# Patient Record
Sex: FEMALE | Race: BLACK OR AFRICAN AMERICAN | NOT HISPANIC OR LATINO | Employment: FULL TIME | URBAN - METROPOLITAN AREA
[De-identification: names, ages, dates, MRNs, and addresses within clinical notes are randomized per-mention and may not be internally consistent; named-entity substitution may affect disease eponyms.]

---

## 2017-08-09 PROBLEM — M77.31 CALCANEAL SPUR, RIGHT FOOT: Status: ACTIVE | Noted: 2017-08-09

## 2018-07-03 ENCOUNTER — TELEPHONE (OUTPATIENT)
Dept: INTERNAL MEDICINE | Facility: CLINIC | Age: 38
End: 2018-07-03

## 2018-08-01 ENCOUNTER — TELEPHONE (OUTPATIENT)
Dept: INTERNAL MEDICINE | Facility: CLINIC | Age: 38
End: 2018-08-01

## 2018-08-01 NOTE — TELEPHONE ENCOUNTER
----- Message from Adele Echols MD sent at 7/18/2018  5:40 PM CDT -----  You may schedule her for Friday in afternoon and  for following week in afternoon. Maybe 3:00 appt or in an opening.   ----- Message -----  From: Adele Echols MD  Sent: 7/6/2018  12:09 PM  To: Adele Echols MD    np

## 2018-08-15 ENCOUNTER — TELEPHONE (OUTPATIENT)
Dept: INTERNAL MEDICINE | Facility: CLINIC | Age: 38
End: 2018-08-15

## 2018-08-15 NOTE — TELEPHONE ENCOUNTER
Please advise and currently not accepting new patients and provide them with some of the other providers open to new patients at this time.

## 2018-09-22 NOTE — TELEPHONE ENCOUNTER
----- Message from Soraya Vuong sent at 7/3/2018 10:24 AM CDT -----  Contact: Self/ [phone]  New patient would like to establish care with you.    Please call and advise.    English

## 2020-08-30 ENCOUNTER — OUTSIDE PLACE OF SERVICE (OUTPATIENT)
Dept: CARDIOLOGY | Facility: CLINIC | Age: 40
End: 2020-08-30
Payer: COMMERCIAL

## 2020-08-30 PROCEDURE — 93010 ELECTROCARDIOGRAM REPORT: CPT | Mod: ,,, | Performed by: INTERNAL MEDICINE

## 2020-08-30 PROCEDURE — 93010 PR ELECTROCARDIOGRAM REPORT: ICD-10-PCS | Mod: ,,, | Performed by: INTERNAL MEDICINE

## 2022-10-01 ENCOUNTER — HOSPITAL ENCOUNTER (EMERGENCY)
Facility: HOSPITAL | Age: 42
Discharge: HOME | End: 2022-10-01
Attending: EMERGENCY MEDICINE
Payer: COMMERCIAL

## 2022-10-01 VITALS
HEIGHT: 64 IN | TEMPERATURE: 99 F | DIASTOLIC BLOOD PRESSURE: 88 MMHG | RESPIRATION RATE: 19 BRPM | OXYGEN SATURATION: 98 % | BODY MASS INDEX: 38.41 KG/M2 | SYSTOLIC BLOOD PRESSURE: 140 MMHG | WEIGHT: 225 LBS | HEART RATE: 85 BPM

## 2022-10-01 DIAGNOSIS — H65.01 NON-RECURRENT ACUTE SEROUS OTITIS MEDIA OF RIGHT EAR: Primary | ICD-10-CM

## 2022-10-01 LAB — B-HCG UR QL: NEGATIVE

## 2022-10-01 PROCEDURE — 81025 URINE PREGNANCY TEST: CPT | Mod: ER | Performed by: EMERGENCY MEDICINE

## 2022-10-01 PROCEDURE — 99284 EMERGENCY DEPT VISIT MOD MDM: CPT | Mod: 25,ER

## 2022-10-01 RX ORDER — CETIRIZINE HYDROCHLORIDE 10 MG/1
10 TABLET ORAL DAILY
Qty: 30 TABLET | Refills: 0 | Status: ON HOLD | OUTPATIENT
Start: 2022-10-01 | End: 2023-04-28

## 2022-10-01 RX ORDER — KETOROLAC TROMETHAMINE 10 MG/1
10 TABLET, FILM COATED ORAL
Status: DISCONTINUED | OUTPATIENT
Start: 2022-10-01 | End: 2022-10-01

## 2022-10-01 RX ORDER — KETOROLAC TROMETHAMINE 10 MG/1
10 TABLET, FILM COATED ORAL EVERY 8 HOURS PRN
Qty: 15 TABLET | Refills: 0 | Status: SHIPPED | OUTPATIENT
Start: 2022-10-01 | End: 2022-10-01 | Stop reason: SDUPTHER

## 2022-10-01 RX ORDER — FLUTICASONE PROPIONATE 50 MCG
1 SPRAY, SUSPENSION (ML) NASAL 2 TIMES DAILY PRN
Qty: 15 G | Refills: 0 | Status: SHIPPED | OUTPATIENT
Start: 2022-10-01 | End: 2022-10-01 | Stop reason: SDUPTHER

## 2022-10-01 RX ORDER — FLUTICASONE PROPIONATE 50 MCG
1 SPRAY, SUSPENSION (ML) NASAL 2 TIMES DAILY PRN
Qty: 15 G | Refills: 0 | Status: ON HOLD | OUTPATIENT
Start: 2022-10-01 | End: 2023-04-28

## 2022-10-01 RX ORDER — KETOROLAC TROMETHAMINE 10 MG/1
10 TABLET, FILM COATED ORAL EVERY 8 HOURS PRN
Qty: 15 TABLET | Refills: 0 | Status: ON HOLD | OUTPATIENT
Start: 2022-10-01 | End: 2023-04-28

## 2022-10-01 RX ORDER — CETIRIZINE HYDROCHLORIDE 10 MG/1
10 TABLET ORAL DAILY
Qty: 30 TABLET | Refills: 0 | COMMUNITY
Start: 2022-10-01 | End: 2022-10-01 | Stop reason: SDUPTHER

## 2022-10-01 RX ORDER — LOSARTAN POTASSIUM 25 MG/1
25 TABLET ORAL DAILY
Status: ON HOLD | COMMUNITY
End: 2023-05-02 | Stop reason: SDUPTHER

## 2022-10-01 RX ORDER — METFORMIN HYDROCHLORIDE 1000 MG/1
1000 TABLET ORAL 2 TIMES DAILY WITH MEALS
COMMUNITY
End: 2024-02-07 | Stop reason: SINTOL

## 2022-10-01 NOTE — ED NOTES
Review of patient's allergies indicates:  No Known Allergies     Patient has verified the spelling of the name and  on armband.   APPEARANCE: Patient is alert, calm, oriented x 4, and does not appear distressed.  SKIN: Skin is normal for race, warm, and dry. Normal skin turgor and mucous membranes moist.  CARDIAC: Normal rate and rhythm, no murmur heard.   RESPIRATORY:Normal rate and effort. Breath sounds clear bilaterally throughout chest. Respirations are equal and unlabored.    GASTRO: Bowel sounds normal, abdomen is soft, no tenderness, and no abdominal distention.  MUSCLE: Full ROM. No bony tenderness or soft tissue tenderness. No obvious deformity.  PERIPHERAL VASCULAR: peripheral pulses present. Normal cap refill. No edema. Warm to touch.  NEURO: 5/5 strength major flexors/extensors bilaterally. Sensory intact to light touch bilaterally. Eyal coma scale: eyes open spontaneously-4, oriented & converses-5, obeys commands-6. No neurological abnormalities.   MENTAL STATUS: awake, alert and aware of environment.  EYE: No overt deficits noted. No drainage. Sclera WNL  ENT: EARS: no obvious drainage but c/o pain to right ear since this morning. NOSE: no active bleeding. THROAT: no redness or swelling.  BREAST: symmetrical. No masses. No tenderness.  GENITALIA: Normal external genitalia.  : Voids without complication

## 2022-10-02 NOTE — ED PROVIDER NOTES
Encounter Date: 10/1/2022       History     Chief Complaint   Patient presents with    Otalgia     Reports that she has had an earache to her right ear since this morning. Has a cotton ball in the ear canal because the wind makes it hurt worse. No drainage     Patient presents with concern regarding earache.  This was noted to onset earlier this morning.  She denies any significant change in her hearing.  She denies any drainage.  Symptoms are in the right ear with no complaints in the left ear at this time.  Patient also denies any significant sinus congestion or rhinorrhea.  Fever and chills are denied.    Review of patient's allergies indicates:  No Known Allergies  Past Medical History:   Diagnosis Date    Diabetes mellitus     Hypertension      Past Surgical History:   Procedure Laterality Date    BREAST LUMPECTOMY Right     TUBAL LIGATION       History reviewed. No pertinent family history.  Social History     Tobacco Use    Smoking status: Never     Passive exposure: Never    Smokeless tobacco: Never   Substance Use Topics    Alcohol use: Never    Drug use: Never     Review of Systems   Constitutional:  Negative for chills and fever.   HENT:  Positive for ear pain. Negative for congestion, ear discharge and rhinorrhea.    Respiratory:  Negative for cough and shortness of breath.    Cardiovascular:  Negative for chest pain and palpitations.   Gastrointestinal:  Negative for abdominal pain, diarrhea and vomiting.   Genitourinary:  Negative for dysuria.   Skin:  Negative for color change and rash.   Neurological:  Negative for dizziness and light-headedness.   Hematological:  Negative for adenopathy. Does not bruise/bleed easily.     Physical Exam     Initial Vitals [10/01/22 1836]   BP Pulse Resp Temp SpO2   (!) 140/88 85 19 98.9 °F (37.2 °C) 98 %      MAP       --         Physical Exam    Nursing note and vitals reviewed.  Constitutional: She appears well-developed and well-nourished. She is not diaphoretic.  No distress.   HENT:   Head: Normocephalic and atraumatic.   Mouth/Throat: Oropharynx is clear and moist.   Eyes: Conjunctivae are normal. No scleral icterus.   Neck: Neck supple. No JVD present.   Cardiovascular:  Normal rate, regular rhythm, normal heart sounds and intact distal pulses.           Pulmonary/Chest: Breath sounds normal. No respiratory distress.   Abdominal: Abdomen is soft. There is no abdominal tenderness.   Musculoskeletal:      Cervical back: Neck supple.     Neurological: She is alert and oriented to person, place, and time.   Skin: Skin is warm and dry.       ED Course   Procedures  Labs Reviewed   PREGNANCY TEST, URINE RAPID    Narrative:     Specimen Source->Urine          Imaging Results    None          Medications   ketorolac tablet 10 mg (has no administration in time range)     Medical Decision Making:   Clinical Tests:   Lab Tests: Ordered and Reviewed  All findings were reviewed with the patient/family in detail.  I see no indication of an emergent process beyond that addressed during our encounter but have duly counseled the patient/family regarding the need for prompt follow-up as well as the indications that should prompt immediate return to the emergency room should new or worrisome developments occur.  The patient has additionally been provided with printed information regarding diagnosis as well as instructions regarding follow up and any medications intended to manage the patient's aforementioned conditions.  The patient/family communicates understanding of all this information and all remaining questions and concerns were addressed at this time.                            Clinical Impression:   Final diagnoses:  [H65.01] Non-recurrent acute serous otitis media of right ear (Primary)        ED Disposition Condition    Discharge Stable          ED Prescriptions       Medication Sig Dispense Start Date End Date Auth. Provider    cetirizine (ZYRTEC) 10 MG tablet Take 1 tablet (10  mg total) by mouth once daily. 30 tablet 10/1/2022 10/1/2023 Don Joy MD    fluticasone propionate (FLONASE) 50 mcg/actuation nasal spray 1 spray (50 mcg total) by Each Nostril route 2 (two) times daily as needed for Rhinitis. 15 g 10/1/2022 -- Don Joy MD    ketorolac (TORADOL) 10 mg tablet Take 1 tablet (10 mg total) by mouth every 8 (eight) hours as needed for Pain. 15 tablet 10/1/2022 -- Don Joy MD          Follow-up Information       Follow up With Specialties Details Why Contact Info    Rosa Maria Cifuentes MD Internal Medicine Schedule an appointment as soon as possible for a visit  for reassessment [address] [place] [address]  [address]  [facility] [address] [phone]      [facility] - Emergency Dept Emergency Medicine Go to  As needed, If symptoms worsen [address] [facility] [address] [phone]             Don Joy MD  10/01/22 1912

## 2023-04-27 ENCOUNTER — HOSPITAL ENCOUNTER (INPATIENT)
Facility: HOSPITAL | Age: 43
LOS: 5 days | Discharge: HOME | DRG: 908 | End: 2023-05-03
Attending: SURGERY | Admitting: OBSTETRICS & GYNECOLOGY
Payer: COMMERCIAL

## 2023-04-27 DIAGNOSIS — E11.9 TYPE 2 DIABETES MELLITUS WITHOUT COMPLICATION, WITHOUT LONG-TERM CURRENT USE OF INSULIN: ICD-10-CM

## 2023-04-27 DIAGNOSIS — R10.31 RLQ ABDOMINAL PAIN: ICD-10-CM

## 2023-04-27 DIAGNOSIS — K35.30 ACUTE APPENDICITIS WITH LOCALIZED PERITONITIS, WITHOUT PERFORATION, ABSCESS, OR GANGRENE: ICD-10-CM

## 2023-04-27 DIAGNOSIS — N70.93 TUBO-OVARIAN ABSCESS: Primary | ICD-10-CM

## 2023-04-27 DIAGNOSIS — M77.31 CALCANEAL SPUR, RIGHT FOOT: ICD-10-CM

## 2023-04-27 DIAGNOSIS — D21.9 FIBROIDS: ICD-10-CM

## 2023-04-27 LAB
ALBUMIN SERPL BCP-MCNC: 4 G/DL (ref 3.5–5.2)
ALP SERPL-CCNC: 96 U/L (ref 38–126)
ALT SERPL W/O P-5'-P-CCNC: 20 U/L (ref 10–44)
ANION GAP SERPL CALC-SCNC: 6 MMOL/L (ref 8–16)
AST SERPL-CCNC: 19 U/L (ref 15–46)
B-HCG UR QL: NEGATIVE
BACTERIA #/AREA URNS AUTO: NORMAL /HPF
BASOPHILS # BLD AUTO: 0.03 K/UL (ref 0–0.2)
BASOPHILS NFR BLD: 0.2 % (ref 0–1.9)
BILIRUB SERPL-MCNC: 0.9 MG/DL (ref 0.1–1)
BILIRUB UR QL STRIP: NEGATIVE
CALCIUM SERPL-MCNC: 8.9 MG/DL (ref 8.7–10.5)
CHLORIDE SERPL-SCNC: 101 MMOL/L (ref 95–110)
CLARITY UR REFRACT.AUTO: CLEAR
CO2 SERPL-SCNC: 26 MMOL/L (ref 23–29)
COLOR UR AUTO: YELLOW
CREAT SERPL-MCNC: 0.62 MG/DL (ref 0.5–1.4)
CTP QC/QA: YES
DIFFERENTIAL METHOD: ABNORMAL
EOSINOPHIL # BLD AUTO: 0 K/UL (ref 0–0.5)
EOSINOPHIL NFR BLD: 0.3 % (ref 0–8)
ERYTHROCYTE [DISTWIDTH] IN BLOOD BY AUTOMATED COUNT: 12.3 % (ref 11.5–14.5)
EST. GFR  (NO RACE VARIABLE): >60 ML/MIN/1.73 M^2
GLUCOSE SERPL-MCNC: 318 MG/DL (ref 70–110)
GLUCOSE UR QL STRIP: ABNORMAL
HCT VFR BLD AUTO: 38.1 % (ref 37–48.5)
HGB BLD-MCNC: 12.2 G/DL (ref 12–16)
HGB UR QL STRIP: NEGATIVE
IMM GRANULOCYTES # BLD AUTO: 0.06 K/UL (ref 0–0.04)
IMM GRANULOCYTES NFR BLD AUTO: 0.4 % (ref 0–0.5)
KETONES UR QL STRIP: ABNORMAL
LACTATE SERPL-SCNC: 1.8 MMOL/L (ref 0.5–2.2)
LEUKOCYTE ESTERASE UR QL STRIP: NEGATIVE
LIPASE SERPL-CCNC: 82 U/L (ref 23–300)
LYMPHOCYTES # BLD AUTO: 3.4 K/UL (ref 1–4.8)
LYMPHOCYTES NFR BLD: 24.1 % (ref 18–48)
MCH RBC QN AUTO: 28.2 PG (ref 27–31)
MCHC RBC AUTO-ENTMCNC: 32 G/DL (ref 32–36)
MCV RBC AUTO: 88 FL (ref 82–98)
MICROSCOPIC COMMENT: NORMAL
MONOCYTES # BLD AUTO: 0.8 K/UL (ref 0.3–1)
MONOCYTES NFR BLD: 5.9 % (ref 4–15)
NEUTROPHILS # BLD AUTO: 9.7 K/UL (ref 1.8–7.7)
NEUTROPHILS NFR BLD: 69.1 % (ref 38–73)
NITRITE UR QL STRIP: NEGATIVE
NRBC BLD-RTO: 0 /100 WBC
PH UR STRIP: 7 [PH] (ref 5–8)
PLATELET # BLD AUTO: 336 K/UL (ref 150–450)
PMV BLD AUTO: 10.6 FL (ref 9.2–12.9)
POTASSIUM SERPL-SCNC: 3.9 MMOL/L (ref 3.5–5.1)
PROT SERPL-MCNC: 7.9 G/DL (ref 6–8.4)
PROT UR QL STRIP: NEGATIVE
RBC # BLD AUTO: 4.32 M/UL (ref 4–5.4)
SODIUM SERPL-SCNC: 133 MMOL/L (ref 136–145)
SP GR UR STRIP: 1.01 (ref 1–1.03)
URN SPEC COLLECT METH UR: ABNORMAL
UROBILINOGEN UR STRIP-ACNC: NEGATIVE EU/DL
UUN UR-MCNC: 6 MG/DL (ref 7–17)
WBC # BLD AUTO: 14.09 K/UL (ref 3.9–12.7)
YEAST UR QL AUTO: NORMAL

## 2023-04-27 PROCEDURE — 96372 THER/PROPH/DIAG INJ SC/IM: CPT | Performed by: PHYSICIAN ASSISTANT

## 2023-04-27 PROCEDURE — 83605 ASSAY OF LACTIC ACID: CPT | Mod: ER | Performed by: PHYSICIAN ASSISTANT

## 2023-04-27 PROCEDURE — 63600175 PHARM REV CODE 636 W HCPCS: Mod: ER | Performed by: PHYSICIAN ASSISTANT

## 2023-04-27 PROCEDURE — 25000003 PHARM REV CODE 250: Mod: ER | Performed by: PHYSICIAN ASSISTANT

## 2023-04-27 PROCEDURE — 96365 THER/PROPH/DIAG IV INF INIT: CPT

## 2023-04-27 PROCEDURE — 85025 COMPLETE CBC W/AUTO DIFF WBC: CPT | Mod: ER | Performed by: PHYSICIAN ASSISTANT

## 2023-04-27 PROCEDURE — 80053 COMPREHEN METABOLIC PANEL: CPT | Mod: ER | Performed by: PHYSICIAN ASSISTANT

## 2023-04-27 PROCEDURE — 96361 HYDRATE IV INFUSION ADD-ON: CPT

## 2023-04-27 PROCEDURE — 96376 TX/PRO/DX INJ SAME DRUG ADON: CPT

## 2023-04-27 PROCEDURE — 25500020 PHARM REV CODE 255: Mod: ER | Performed by: PHYSICIAN ASSISTANT

## 2023-04-27 PROCEDURE — 81025 URINE PREGNANCY TEST: CPT | Mod: ER | Performed by: PHYSICIAN ASSISTANT

## 2023-04-27 PROCEDURE — 83690 ASSAY OF LIPASE: CPT | Mod: ER | Performed by: PHYSICIAN ASSISTANT

## 2023-04-27 PROCEDURE — 96375 TX/PRO/DX INJ NEW DRUG ADDON: CPT

## 2023-04-27 PROCEDURE — 81000 URINALYSIS NONAUTO W/SCOPE: CPT | Mod: ER | Performed by: PHYSICIAN ASSISTANT

## 2023-04-27 PROCEDURE — 99285 EMERGENCY DEPT VISIT HI MDM: CPT | Mod: 25

## 2023-04-27 RX ORDER — ONDANSETRON 2 MG/ML
4 INJECTION INTRAMUSCULAR; INTRAVENOUS
Status: COMPLETED | OUTPATIENT
Start: 2023-04-27 | End: 2023-04-27

## 2023-04-27 RX ORDER — MORPHINE SULFATE 4 MG/ML
4 INJECTION, SOLUTION INTRAMUSCULAR; INTRAVENOUS
Status: COMPLETED | OUTPATIENT
Start: 2023-04-27 | End: 2023-04-27

## 2023-04-27 RX ORDER — PROMETHAZINE HYDROCHLORIDE 25 MG/ML
12.5 INJECTION, SOLUTION INTRAMUSCULAR; INTRAVENOUS
Status: COMPLETED | OUTPATIENT
Start: 2023-04-27 | End: 2023-04-27

## 2023-04-27 RX ADMIN — MORPHINE SULFATE 4 MG: 4 INJECTION INTRAVENOUS at 07:04

## 2023-04-27 RX ADMIN — ONDANSETRON HYDROCHLORIDE 4 MG: 2 SOLUTION INTRAMUSCULAR; INTRAVENOUS at 09:04

## 2023-04-27 RX ADMIN — PROMETHAZINE HYDROCHLORIDE 12.5 MG: 25 INJECTION INTRAMUSCULAR; INTRAVENOUS at 06:04

## 2023-04-27 RX ADMIN — IOHEXOL 100 ML: 350 INJECTION, SOLUTION INTRAVENOUS at 08:04

## 2023-04-27 RX ADMIN — PIPERACILLIN AND TAZOBACTAM 4.5 G: 4; .5 INJECTION, POWDER, LYOPHILIZED, FOR SOLUTION INTRAVENOUS; PARENTERAL at 09:04

## 2023-04-27 RX ADMIN — SODIUM CHLORIDE 1000 ML: 0.9 INJECTION, SOLUTION INTRAVENOUS at 07:04

## 2023-04-27 RX ADMIN — MORPHINE SULFATE 4 MG: 4 INJECTION INTRAVENOUS at 09:04

## 2023-04-27 NOTE — Clinical Note
Diagnosis: Acute appendicitis with localized peritonitis, without perforation, abscess, or gangrene [[id number]]   Future Attending Provider: OMEGA REYNOSO [[id number]]   Admitting Provider:: OMEGA REYNOSO [[id number]]

## 2023-04-28 ENCOUNTER — ANESTHESIA EVENT (OUTPATIENT)
Dept: INTERVENTIONAL RADIOLOGY/VASCULAR | Facility: HOSPITAL | Age: 43
DRG: 908 | End: 2023-04-28
Payer: COMMERCIAL

## 2023-04-28 PROBLEM — N70.93 TUBO-OVARIAN ABSCESS: Status: ACTIVE | Noted: 2023-04-28

## 2023-04-28 PROBLEM — I10 PRIMARY HYPERTENSION: Status: ACTIVE | Noted: 2023-04-28

## 2023-04-28 PROBLEM — E11.9 TYPE 2 DIABETES MELLITUS WITHOUT COMPLICATION, WITHOUT LONG-TERM CURRENT USE OF INSULIN: Status: ACTIVE | Noted: 2023-04-28

## 2023-04-28 LAB
ABO + RH BLD: NORMAL
BASOPHILS # BLD AUTO: 0.01 K/UL (ref 0–0.2)
BASOPHILS # BLD AUTO: 0.03 K/UL (ref 0–0.2)
BASOPHILS NFR BLD: 0.3 % (ref 0–1.9)
BASOPHILS NFR BLD: 0.3 % (ref 0–1.9)
BLD GP AB SCN CELLS X3 SERPL QL: NORMAL
BLD PROD TYP BPU: NORMAL
BLD PROD TYP BPU: NORMAL
BLOOD UNIT EXPIRATION DATE: NORMAL
BLOOD UNIT EXPIRATION DATE: NORMAL
BLOOD UNIT TYPE CODE: 5100
BLOOD UNIT TYPE CODE: 5100
BLOOD UNIT TYPE: NORMAL
BLOOD UNIT TYPE: NORMAL
CODING SYSTEM: NORMAL
CODING SYSTEM: NORMAL
CROSSMATCH INTERPRETATION: NORMAL
CROSSMATCH INTERPRETATION: NORMAL
DIFFERENTIAL METHOD: ABNORMAL
DIFFERENTIAL METHOD: ABNORMAL
DISPENSE STATUS: NORMAL
DISPENSE STATUS: NORMAL
EOSINOPHIL # BLD AUTO: 0 K/UL (ref 0–0.5)
EOSINOPHIL # BLD AUTO: 0.1 K/UL (ref 0–0.5)
EOSINOPHIL NFR BLD: 0.5 % (ref 0–8)
EOSINOPHIL NFR BLD: 0.6 % (ref 0–8)
ERYTHROCYTE [DISTWIDTH] IN BLOOD BY AUTOMATED COUNT: 12.7 % (ref 11.5–14.5)
ESTIMATED AVG GLUCOSE: 240 MG/DL (ref 68–131)
HBA1C MFR BLD: 10 % (ref 4–5.6)
HCT VFR BLD AUTO: 13.1 % (ref 37–48.5)
HCT VFR BLD AUTO: 35.8 % (ref 37–48.5)
HCT VFR BLD AUTO: 42.6 % (ref 37–48.5)
HGB BLD-MCNC: 11.2 G/DL (ref 12–16)
HGB BLD-MCNC: 13.2 G/DL (ref 12–16)
HGB BLD-MCNC: 3.9 G/DL (ref 12–16)
IMM GRANULOCYTES # BLD AUTO: 0.02 K/UL (ref 0–0.04)
IMM GRANULOCYTES # BLD AUTO: 0.05 K/UL (ref 0–0.04)
IMM GRANULOCYTES NFR BLD AUTO: 0.4 % (ref 0–0.5)
IMM GRANULOCYTES NFR BLD AUTO: 0.5 % (ref 0–0.5)
INR PPP: 1.6 (ref 0.8–1.2)
LACTATE SERPL-SCNC: 2.6 MMOL/L (ref 0.5–2.2)
LYMPHOCYTES # BLD AUTO: 1.4 K/UL (ref 1–4.8)
LYMPHOCYTES # BLD AUTO: 2.4 K/UL (ref 1–4.8)
LYMPHOCYTES NFR BLD: 20.5 % (ref 18–48)
LYMPHOCYTES NFR BLD: 34.1 % (ref 18–48)
MCH RBC QN AUTO: 28.5 PG (ref 27–31)
MCH RBC QN AUTO: 28.5 PG (ref 27–31)
MCH RBC QN AUTO: 29.7 PG (ref 27–31)
MCHC RBC AUTO-ENTMCNC: 29.8 G/DL (ref 32–36)
MCHC RBC AUTO-ENTMCNC: 31 G/DL (ref 32–36)
MCHC RBC AUTO-ENTMCNC: 31.3 G/DL (ref 32–36)
MCV RBC AUTO: 91 FL (ref 82–98)
MCV RBC AUTO: 96 FL (ref 82–98)
MCV RBC AUTO: 96 FL (ref 82–98)
MONOCYTES # BLD AUTO: 0.4 K/UL (ref 0.3–1)
MONOCYTES # BLD AUTO: 0.7 K/UL (ref 0.3–1)
MONOCYTES NFR BLD: 5.5 % (ref 4–15)
MONOCYTES NFR BLD: 9.3 % (ref 4–15)
NEUTROPHILS # BLD AUTO: 2.2 K/UL (ref 1.8–7.7)
NEUTROPHILS # BLD AUTO: 8.6 K/UL (ref 1.8–7.7)
NEUTROPHILS NFR BLD: 55.3 % (ref 38–73)
NEUTROPHILS NFR BLD: 72.7 % (ref 38–73)
NRBC BLD-RTO: 0 /100 WBC
NRBC BLD-RTO: 0 /100 WBC
NUM UNITS TRANS PACKED RBC: NORMAL
NUM UNITS TRANS PACKED RBC: NORMAL
PLATELET # BLD AUTO: 129 K/UL (ref 150–450)
PLATELET # BLD AUTO: 279 K/UL (ref 150–450)
PLATELET # BLD AUTO: 313 K/UL (ref 150–450)
PLATELET BLD QL SMEAR: ABNORMAL
PLATELET BLD QL SMEAR: NORMAL
PMV BLD AUTO: 10 FL (ref 9.2–12.9)
PMV BLD AUTO: 10.4 FL (ref 9.2–12.9)
PMV BLD AUTO: 11 FL (ref 9.2–12.9)
POCT GLUCOSE: 244 MG/DL (ref 70–110)
POCT GLUCOSE: 271 MG/DL (ref 70–110)
POCT GLUCOSE: 293 MG/DL (ref 70–110)
PROTHROMBIN TIME: 16.4 SEC (ref 9–12.5)
RBC # BLD AUTO: 1.37 M/UL (ref 4–5.4)
RBC # BLD AUTO: 3.93 M/UL (ref 4–5.4)
RBC # BLD AUTO: 4.44 M/UL (ref 4–5.4)
SPECIMEN OUTDATE: NORMAL
WBC # BLD AUTO: 11.88 K/UL (ref 3.9–12.7)
WBC # BLD AUTO: 17.22 K/UL (ref 3.9–12.7)
WBC # BLD AUTO: 3.96 K/UL (ref 3.9–12.7)

## 2023-04-28 PROCEDURE — 85025 COMPLETE CBC W/AUTO DIFF WBC: CPT | Mod: 91 | Performed by: OBSTETRICS & GYNECOLOGY

## 2023-04-28 PROCEDURE — 36415 COLL VENOUS BLD VENIPUNCTURE: CPT | Performed by: STUDENT IN AN ORGANIZED HEALTH CARE EDUCATION/TRAINING PROGRAM

## 2023-04-28 PROCEDURE — P9016 RBC LEUKOCYTES REDUCED: HCPCS | Performed by: OBSTETRICS & GYNECOLOGY

## 2023-04-28 PROCEDURE — 20000000 HC ICU ROOM

## 2023-04-28 PROCEDURE — 99223 PR INITIAL HOSPITAL CARE,LEVL III: ICD-10-PCS | Mod: ,,, | Performed by: OBSTETRICS & GYNECOLOGY

## 2023-04-28 PROCEDURE — 25000003 PHARM REV CODE 250: Performed by: NURSE ANESTHETIST, CERTIFIED REGISTERED

## 2023-04-28 PROCEDURE — 99223 1ST HOSP IP/OBS HIGH 75: CPT | Mod: ,,, | Performed by: OBSTETRICS & GYNECOLOGY

## 2023-04-28 PROCEDURE — 63600175 PHARM REV CODE 636 W HCPCS

## 2023-04-28 PROCEDURE — 83605 ASSAY OF LACTIC ACID: CPT | Performed by: OBSTETRICS & GYNECOLOGY

## 2023-04-28 PROCEDURE — 85027 COMPLETE CBC AUTOMATED: CPT | Performed by: STUDENT IN AN ORGANIZED HEALTH CARE EDUCATION/TRAINING PROGRAM

## 2023-04-28 PROCEDURE — 87591 N.GONORRHOEAE DNA AMP PROB: CPT | Performed by: STUDENT IN AN ORGANIZED HEALTH CARE EDUCATION/TRAINING PROGRAM

## 2023-04-28 PROCEDURE — 96372 THER/PROPH/DIAG INJ SC/IM: CPT | Performed by: STUDENT IN AN ORGANIZED HEALTH CARE EDUCATION/TRAINING PROGRAM

## 2023-04-28 PROCEDURE — 36430 TRANSFUSION BLD/BLD COMPNT: CPT

## 2023-04-28 PROCEDURE — 25500020 PHARM REV CODE 255: Performed by: RADIOLOGY

## 2023-04-28 PROCEDURE — 85610 PROTHROMBIN TIME: CPT | Performed by: OBSTETRICS & GYNECOLOGY

## 2023-04-28 PROCEDURE — 86920 COMPATIBILITY TEST SPIN: CPT | Performed by: RADIOLOGY

## 2023-04-28 PROCEDURE — D9220A PRA ANESTHESIA: Mod: CRNA,,, | Performed by: NURSE ANESTHETIST, CERTIFIED REGISTERED

## 2023-04-28 PROCEDURE — 63600175 PHARM REV CODE 636 W HCPCS: Performed by: SURGERY

## 2023-04-28 PROCEDURE — S0030 INJECTION, METRONIDAZOLE: HCPCS | Performed by: OBSTETRICS & GYNECOLOGY

## 2023-04-28 PROCEDURE — 51701 INSERT BLADDER CATHETER: CPT

## 2023-04-28 PROCEDURE — 25000003 PHARM REV CODE 250: Performed by: EMERGENCY MEDICINE

## 2023-04-28 PROCEDURE — 83036 HEMOGLOBIN GLYCOSYLATED A1C: CPT | Performed by: STUDENT IN AN ORGANIZED HEALTH CARE EDUCATION/TRAINING PROGRAM

## 2023-04-28 PROCEDURE — 63600175 PHARM REV CODE 636 W HCPCS: Performed by: STUDENT IN AN ORGANIZED HEALTH CARE EDUCATION/TRAINING PROGRAM

## 2023-04-28 PROCEDURE — 25000003 PHARM REV CODE 250: Performed by: STUDENT IN AN ORGANIZED HEALTH CARE EDUCATION/TRAINING PROGRAM

## 2023-04-28 PROCEDURE — 36415 COLL VENOUS BLD VENIPUNCTURE: CPT | Performed by: OBSTETRICS & GYNECOLOGY

## 2023-04-28 PROCEDURE — 94002 VENT MGMT INPAT INIT DAY: CPT

## 2023-04-28 PROCEDURE — 25000003 PHARM REV CODE 250: Performed by: RADIOLOGY

## 2023-04-28 PROCEDURE — 85025 COMPLETE CBC W/AUTO DIFF WBC: CPT | Performed by: STUDENT IN AN ORGANIZED HEALTH CARE EDUCATION/TRAINING PROGRAM

## 2023-04-28 PROCEDURE — D9220A PRA ANESTHESIA: Mod: ANES,,, | Performed by: ANESTHESIOLOGY

## 2023-04-28 PROCEDURE — D9220A PRA ANESTHESIA: ICD-10-PCS | Mod: CRNA,,, | Performed by: NURSE ANESTHETIST, CERTIFIED REGISTERED

## 2023-04-28 PROCEDURE — 63600175 PHARM REV CODE 636 W HCPCS: Performed by: RADIOLOGY

## 2023-04-28 PROCEDURE — 63600175 PHARM REV CODE 636 W HCPCS: Performed by: NURSE ANESTHETIST, CERTIFIED REGISTERED

## 2023-04-28 PROCEDURE — D9220A PRA ANESTHESIA: ICD-10-PCS | Mod: ANES,,, | Performed by: ANESTHESIOLOGY

## 2023-04-28 PROCEDURE — 51798 US URINE CAPACITY MEASURE: CPT

## 2023-04-28 PROCEDURE — 63600175 PHARM REV CODE 636 W HCPCS: Performed by: EMERGENCY MEDICINE

## 2023-04-28 PROCEDURE — 96372 THER/PROPH/DIAG INJ SC/IM: CPT | Mod: 59 | Performed by: SURGERY

## 2023-04-28 PROCEDURE — 94761 N-INVAS EAR/PLS OXIMETRY MLT: CPT

## 2023-04-28 PROCEDURE — 25500020 PHARM REV CODE 255: Performed by: OBSTETRICS & GYNECOLOGY

## 2023-04-28 PROCEDURE — 87040 BLOOD CULTURE FOR BACTERIA: CPT | Performed by: STUDENT IN AN ORGANIZED HEALTH CARE EDUCATION/TRAINING PROGRAM

## 2023-04-28 PROCEDURE — 99900035 HC TECH TIME PER 15 MIN (STAT)

## 2023-04-28 PROCEDURE — 25000003 PHARM REV CODE 250: Performed by: OBSTETRICS & GYNECOLOGY

## 2023-04-28 PROCEDURE — 86920 COMPATIBILITY TEST SPIN: CPT | Performed by: OBSTETRICS & GYNECOLOGY

## 2023-04-28 PROCEDURE — 25500020 PHARM REV CODE 255: Performed by: SURGERY

## 2023-04-28 PROCEDURE — 86900 BLOOD TYPING SEROLOGIC ABO: CPT | Performed by: OBSTETRICS & GYNECOLOGY

## 2023-04-28 PROCEDURE — A9585 GADOBUTROL INJECTION: HCPCS | Performed by: SURGERY

## 2023-04-28 RX ORDER — FENTANYL CITRATE 50 UG/ML
INJECTION, SOLUTION INTRAMUSCULAR; INTRAVENOUS
Status: DISCONTINUED | OUTPATIENT
Start: 2023-04-28 | End: 2023-04-28

## 2023-04-28 RX ORDER — METRONIDAZOLE 500 MG/100ML
500 INJECTION, SOLUTION INTRAVENOUS
Status: DISCONTINUED | OUTPATIENT
Start: 2023-04-28 | End: 2023-05-02

## 2023-04-28 RX ORDER — FENTANYL CITRATE 50 UG/ML
INJECTION, SOLUTION INTRAMUSCULAR; INTRAVENOUS
Status: COMPLETED | OUTPATIENT
Start: 2023-04-28 | End: 2023-04-28

## 2023-04-28 RX ORDER — SODIUM CHLORIDE 0.9 % (FLUSH) 0.9 %
10 SYRINGE (ML) INJECTION
Status: DISCONTINUED | OUTPATIENT
Start: 2023-04-28 | End: 2023-05-03 | Stop reason: HOSPADM

## 2023-04-28 RX ORDER — LOSARTAN POTASSIUM 25 MG/1
25 TABLET ORAL DAILY
Status: DISCONTINUED | OUTPATIENT
Start: 2023-04-28 | End: 2023-04-29

## 2023-04-28 RX ORDER — SUCCINYLCHOLINE CHLORIDE 20 MG/ML
INJECTION INTRAMUSCULAR; INTRAVENOUS
Status: DISCONTINUED | OUTPATIENT
Start: 2023-04-28 | End: 2023-04-28

## 2023-04-28 RX ORDER — LIDOCAINE HYDROCHLORIDE 10 MG/ML
INJECTION INFILTRATION; PERINEURAL
Status: COMPLETED | OUTPATIENT
Start: 2023-04-28 | End: 2023-04-28

## 2023-04-28 RX ORDER — HYDROCODONE BITARTRATE AND ACETAMINOPHEN 500; 5 MG/1; MG/1
TABLET ORAL
Status: DISCONTINUED | OUTPATIENT
Start: 2023-04-28 | End: 2023-04-30

## 2023-04-28 RX ORDER — SODIUM CHLORIDE 9 MG/ML
INJECTION, SOLUTION INTRAVENOUS CONTINUOUS
Status: DISCONTINUED | OUTPATIENT
Start: 2023-04-28 | End: 2023-04-28

## 2023-04-28 RX ORDER — INSULIN ASPART 100 [IU]/ML
1-10 INJECTION, SOLUTION INTRAVENOUS; SUBCUTANEOUS EVERY 6 HOURS PRN
Status: DISCONTINUED | OUTPATIENT
Start: 2023-04-28 | End: 2023-05-03 | Stop reason: HOSPADM

## 2023-04-28 RX ORDER — HYDROMORPHONE HYDROCHLORIDE 1 MG/ML
1 INJECTION, SOLUTION INTRAMUSCULAR; INTRAVENOUS; SUBCUTANEOUS EVERY 4 HOURS PRN
Status: DISCONTINUED | OUTPATIENT
Start: 2023-04-28 | End: 2023-04-28

## 2023-04-28 RX ORDER — LIDOCAINE HYDROCHLORIDE 20 MG/ML
INJECTION INTRAVENOUS
Status: DISCONTINUED | OUTPATIENT
Start: 2023-04-28 | End: 2023-04-28

## 2023-04-28 RX ORDER — ONDANSETRON 2 MG/ML
4 INJECTION INTRAMUSCULAR; INTRAVENOUS EVERY 8 HOURS PRN
Status: DISCONTINUED | OUTPATIENT
Start: 2023-04-28 | End: 2023-05-03 | Stop reason: HOSPADM

## 2023-04-28 RX ORDER — PROPOFOL 10 MG/ML
0-50 INJECTION, EMULSION INTRAVENOUS CONTINUOUS
Status: DISCONTINUED | OUTPATIENT
Start: 2023-04-28 | End: 2023-04-29

## 2023-04-28 RX ORDER — GADOBUTROL 604.72 MG/ML
10 INJECTION INTRAVENOUS
Status: COMPLETED | OUTPATIENT
Start: 2023-04-28 | End: 2023-04-28

## 2023-04-28 RX ORDER — ROCURONIUM BROMIDE 10 MG/ML
INJECTION, SOLUTION INTRAVENOUS
Status: DISCONTINUED | OUTPATIENT
Start: 2023-04-28 | End: 2023-04-28

## 2023-04-28 RX ORDER — PROCHLORPERAZINE EDISYLATE 5 MG/ML
5 INJECTION INTRAMUSCULAR; INTRAVENOUS EVERY 6 HOURS PRN
Status: DISCONTINUED | OUTPATIENT
Start: 2023-04-28 | End: 2023-05-03 | Stop reason: HOSPADM

## 2023-04-28 RX ORDER — DOXYCYCLINE HYCLATE 100 MG
100 TABLET ORAL EVERY 12 HOURS
Status: DISCONTINUED | OUTPATIENT
Start: 2023-04-28 | End: 2023-04-28

## 2023-04-28 RX ORDER — GLUCAGON 1 MG
1 KIT INJECTION
Status: DISCONTINUED | OUTPATIENT
Start: 2023-04-28 | End: 2023-05-03 | Stop reason: HOSPADM

## 2023-04-28 RX ORDER — MORPHINE SULFATE 4 MG/ML
4 INJECTION, SOLUTION INTRAMUSCULAR; INTRAVENOUS EVERY 4 HOURS PRN
Status: DISCONTINUED | OUTPATIENT
Start: 2023-04-28 | End: 2023-04-28

## 2023-04-28 RX ORDER — CHLORHEXIDINE GLUCONATE ORAL RINSE 1.2 MG/ML
15 SOLUTION DENTAL 2 TIMES DAILY
Status: DISCONTINUED | OUTPATIENT
Start: 2023-04-28 | End: 2023-05-03 | Stop reason: HOSPADM

## 2023-04-28 RX ORDER — ONDANSETRON 2 MG/ML
INJECTION INTRAMUSCULAR; INTRAVENOUS
Status: DISCONTINUED | OUTPATIENT
Start: 2023-04-28 | End: 2023-04-28

## 2023-04-28 RX ORDER — PROPOFOL 10 MG/ML
INJECTION, EMULSION INTRAVENOUS
Status: COMPLETED
Start: 2023-04-28 | End: 2023-04-28

## 2023-04-28 RX ORDER — METRONIDAZOLE 500 MG/1
500 TABLET ORAL EVERY 8 HOURS
Status: DISCONTINUED | OUTPATIENT
Start: 2023-04-28 | End: 2023-04-28

## 2023-04-28 RX ORDER — FENTANYL CITRATE 50 UG/ML
50 INJECTION, SOLUTION INTRAMUSCULAR; INTRAVENOUS
Status: DISCONTINUED | OUTPATIENT
Start: 2023-04-28 | End: 2023-04-29

## 2023-04-28 RX ORDER — SODIUM CHLORIDE 9 MG/ML
INJECTION, SOLUTION INTRAVENOUS
Status: COMPLETED | OUTPATIENT
Start: 2023-04-28 | End: 2023-04-28

## 2023-04-28 RX ORDER — MIDAZOLAM HYDROCHLORIDE 1 MG/ML
INJECTION INTRAMUSCULAR; INTRAVENOUS
Status: COMPLETED | OUTPATIENT
Start: 2023-04-28 | End: 2023-04-28

## 2023-04-28 RX ORDER — PROPOFOL 10 MG/ML
VIAL (ML) INTRAVENOUS
Status: DISCONTINUED | OUTPATIENT
Start: 2023-04-28 | End: 2023-04-28

## 2023-04-28 RX ORDER — INSULIN ASPART 100 [IU]/ML
5 INJECTION, SOLUTION INTRAVENOUS; SUBCUTANEOUS ONCE
Status: COMPLETED | OUTPATIENT
Start: 2023-04-28 | End: 2023-04-28

## 2023-04-28 RX ORDER — FENTANYL CITRATE 50 UG/ML
50 INJECTION, SOLUTION INTRAMUSCULAR; INTRAVENOUS
Status: DISPENSED | OUTPATIENT
Start: 2023-04-28 | End: 2023-04-28

## 2023-04-28 RX ORDER — MIDAZOLAM HYDROCHLORIDE 1 MG/ML
INJECTION INTRAMUSCULAR; INTRAVENOUS
Status: DISCONTINUED | OUTPATIENT
Start: 2023-04-28 | End: 2023-04-28

## 2023-04-28 RX ORDER — HEPARIN SODIUM 200 [USP'U]/100ML
INJECTION, SOLUTION INTRAVENOUS
Status: COMPLETED | OUTPATIENT
Start: 2023-04-28 | End: 2023-04-28

## 2023-04-28 RX ORDER — HYDROMORPHONE HYDROCHLORIDE 1 MG/ML
INJECTION, SOLUTION INTRAMUSCULAR; INTRAVENOUS; SUBCUTANEOUS
Status: COMPLETED | OUTPATIENT
Start: 2023-04-28 | End: 2023-04-28

## 2023-04-28 RX ADMIN — INSULIN ASPART 5 UNITS: 100 INJECTION, SOLUTION INTRAVENOUS; SUBCUTANEOUS at 12:04

## 2023-04-28 RX ADMIN — SODIUM CHLORIDE 1000 ML: 0.9 INJECTION, SOLUTION INTRAVENOUS at 04:04

## 2023-04-28 RX ADMIN — INSULIN ASPART 6 UNITS: 100 INJECTION, SOLUTION INTRAVENOUS; SUBCUTANEOUS at 12:04

## 2023-04-28 RX ADMIN — DOXYCYCLINE HYCLATE 100 MG: 100 TABLET, COATED ORAL at 11:04

## 2023-04-28 RX ADMIN — PROPOFOL 5 MCG/KG/MIN: 10 INJECTION, EMULSION INTRAVENOUS at 07:04

## 2023-04-28 RX ADMIN — IOHEXOL 100 ML: 350 INJECTION, SOLUTION INTRAVENOUS at 05:04

## 2023-04-28 RX ADMIN — CHLORHEXIDINE GLUCONATE 0.12% ORAL RINSE 15 ML: 1.2 LIQUID ORAL at 08:04

## 2023-04-28 RX ADMIN — PROPOFOL 50 MG: 10 INJECTION, EMULSION INTRAVENOUS at 06:04

## 2023-04-28 RX ADMIN — LIDOCAINE HYDROCHLORIDE 10 ML: 10 INJECTION, SOLUTION INFILTRATION; PERINEURAL at 04:04

## 2023-04-28 RX ADMIN — IOHEXOL 50 ML: 350 INJECTION, SOLUTION INTRAVENOUS at 06:04

## 2023-04-28 RX ADMIN — SODIUM CHLORIDE 500 ML: 0.9 INJECTION, SOLUTION INTRAVENOUS at 04:04

## 2023-04-28 RX ADMIN — SODIUM CHLORIDE: 0.9 INJECTION, SOLUTION INTRAVENOUS at 11:04

## 2023-04-28 RX ADMIN — GADOBUTROL 10 ML: 604.72 INJECTION INTRAVENOUS at 08:04

## 2023-04-28 RX ADMIN — HYDROMORPHONE HYDROCHLORIDE 1 MG: 1 INJECTION, SOLUTION INTRAMUSCULAR; INTRAVENOUS; SUBCUTANEOUS at 12:04

## 2023-04-28 RX ADMIN — LIDOCAINE HYDROCHLORIDE 100 MG: 20 INJECTION, SOLUTION INTRAVENOUS at 06:04

## 2023-04-28 RX ADMIN — HEPARIN SODIUM 1000 UNITS/HR: 200 INJECTION, SOLUTION INTRAVENOUS at 06:04

## 2023-04-28 RX ADMIN — ROCURONIUM BROMIDE 50 MG: 10 INJECTION, SOLUTION INTRAVENOUS at 06:04

## 2023-04-28 RX ADMIN — PROPOFOL 35 MCG/KG/MIN: 10 INJECTION, EMULSION INTRAVENOUS at 11:04

## 2023-04-28 RX ADMIN — HYDROMORPHONE HYDROCHLORIDE 1 MG: 1 INJECTION, SOLUTION INTRAMUSCULAR; INTRAVENOUS; SUBCUTANEOUS at 08:04

## 2023-04-28 RX ADMIN — INSULIN ASPART 4 UNITS: 100 INJECTION, SOLUTION INTRAVENOUS; SUBCUTANEOUS at 08:04

## 2023-04-28 RX ADMIN — DOXYCYCLINE 100 MG: 100 INJECTION, POWDER, LYOPHILIZED, FOR SOLUTION INTRAVENOUS at 08:04

## 2023-04-28 RX ADMIN — FENTANYL CITRATE 50 MCG: 50 INJECTION, SOLUTION INTRAMUSCULAR; INTRAVENOUS at 04:04

## 2023-04-28 RX ADMIN — CEFTRIAXONE 1 G: 1 INJECTION, POWDER, FOR SOLUTION INTRAMUSCULAR; INTRAVENOUS at 11:04

## 2023-04-28 RX ADMIN — METRONIDAZOLE 500 MG: 5 INJECTION, SOLUTION INTRAVENOUS at 08:04

## 2023-04-28 RX ADMIN — SODIUM CHLORIDE: 0.9 INJECTION, SOLUTION INTRAVENOUS at 02:04

## 2023-04-28 RX ADMIN — INSULIN DETEMIR 5 UNITS: 100 INJECTION, SOLUTION SUBCUTANEOUS at 08:04

## 2023-04-28 RX ADMIN — MIDAZOLAM HYDROCHLORIDE 2 MG: 1 INJECTION, SOLUTION INTRAMUSCULAR; INTRAVENOUS at 06:04

## 2023-04-28 RX ADMIN — SODIUM CHLORIDE: 0.9 INJECTION, SOLUTION INTRAVENOUS at 06:04

## 2023-04-28 RX ADMIN — ONDANSETRON 4 MG: 2 INJECTION, SOLUTION INTRAMUSCULAR; INTRAVENOUS at 06:04

## 2023-04-28 RX ADMIN — LIDOCAINE HYDROCHLORIDE 3 ML: 10 INJECTION, SOLUTION INFILTRATION; PERINEURAL at 06:04

## 2023-04-28 RX ADMIN — SUCCINYLCHOLINE CHLORIDE 100 MG: 20 INJECTION, SOLUTION INTRAMUSCULAR; INTRAVENOUS at 06:04

## 2023-04-28 RX ADMIN — ONDANSETRON HYDROCHLORIDE 4 MG: 2 INJECTION, SOLUTION INTRAMUSCULAR; INTRAVENOUS at 08:04

## 2023-04-28 RX ADMIN — HYDROMORPHONE HYDROCHLORIDE 1 MG: 1 INJECTION, SOLUTION INTRAMUSCULAR; INTRAVENOUS; SUBCUTANEOUS at 01:04

## 2023-04-28 RX ADMIN — FENTANYL CITRATE 100 MCG: 50 INJECTION, SOLUTION INTRAMUSCULAR; INTRAVENOUS at 06:04

## 2023-04-28 RX ADMIN — MIDAZOLAM HYDROCHLORIDE 1 MG: 1 INJECTION INTRAMUSCULAR; INTRAVENOUS at 04:04

## 2023-04-28 RX ADMIN — LOSARTAN POTASSIUM 25 MG: 25 TABLET, FILM COATED ORAL at 12:04

## 2023-04-28 RX ADMIN — FENTANYL CITRATE 50 MCG: 50 INJECTION INTRAMUSCULAR; INTRAVENOUS at 07:04

## 2023-04-28 RX ADMIN — PIPERACILLIN AND TAZOBACTAM 4.5 G: 4; .5 INJECTION, POWDER, LYOPHILIZED, FOR SOLUTION INTRAVENOUS; PARENTERAL at 05:04

## 2023-04-28 RX ADMIN — METRONIDAZOLE 500 MG: 500 TABLET ORAL at 02:04

## 2023-04-28 RX ADMIN — HYDROMORPHONE HYDROCHLORIDE 1 MG: 1 INJECTION, SOLUTION INTRAMUSCULAR; INTRAVENOUS; SUBCUTANEOUS at 04:04

## 2023-04-28 NOTE — PROGRESS NOTES
04/28/23 0400   Admission   Initial VN Admission Questions Complete   Shift   Pain Management Interventions pain management plan reviewed with patient/caregiver   Virtual Nurse - Patient Verbalized Approval Of Camera Use;VN Rounding   Safety/Activity   Safety Promotion/Fall Prevention Fall Risk reviewed with patient/family;other (see comments)  (room dark; unable to visualize pt and surroundings)   VN cued in to pt's room with permission. Admission questions completed. Plan of care reviewed with pt. Pt denies any needs.  Instructed to call for needs/assist oob.

## 2023-04-28 NOTE — CONSULTS
Interventional Radiology Consult/Pre-Procedure Note      Chief Complaint/Reason for Consult: Abscess    History of Present Illness:  Chelsi Johnson is a 43 y.o. female with the PMH listed below who presents for bilateral TOA. D/w Dr. Sampson by phone. Perc drainage of at least the right-sided tube is requested.    Admission H&P reviewed.    Past Medical History:   Diagnosis Date    Diabetes mellitus     Hypertension      Past Surgical History:   Procedure Laterality Date    BREAST LUMPECTOMY Right     TUBAL LIGATION         Allergies:   Review of patient's allergies indicates:  No Known Allergies    Scheduled Meds:    cefTRIAXone (ROCEPHIN) IVPB  1 g Intravenous Q24H    doxycycline  100 mg Oral Q12H    insulin detemir U-100  5 Units Subcutaneous QHS    losartan  25 mg Oral Daily    metroNIDAZOLE  500 mg Oral Q8H     Continuous Infusions:    sodium chloride 0.9%       PRN Meds:sodium chloride 0.9%, dextrose 10%, dextrose 10%, fentaNYL, glucagon (human recombinant), HYDROmorphone, HYDROmorphone, insulin aspart U-100, LIDOcaine HCL 10 mg/ml (1%), midazolam, ondansetron, prochlorperazine, sodium chloride 0.9%    Anticoagulation/Antiplatelet Meds: no anticoagulation    Review of Systems:   As documented in admission H&P.    Physical Exam:  Temp: 98 °F (36.7 °C) (04/28/23 1202)  Pulse: 85 (04/28/23 1725)  Resp: (!) 30 (04/28/23 1725)  BP: 136/71 (04/28/23 1725)  SpO2: 100 % (04/28/23 1725)     General: NAD  HEENT: Normocephalic, sclera anicteric, oropharynx clear  Heart: RRR  Lungs: Symmetric excursions, breathing unlabored  Abd: Bilateral lower abd TTP  Extremities: MENDEZ  Neuro: Gross nonfocal    Labs:  No results for input(s): INR in the last 168 hours.    Invalid input(s):  PT,  PTT    Recent Labs   Lab 04/28/23  1224   WBC 11.88   HGB 11.2*   HCT 35.8*   MCV 91         Recent Labs   Lab 04/27/23  1904   *   *   K 3.9      CO2 26   BUN 6*   CREATININE 0.62   CALCIUM 8.9   ALT 20   AST 19    ALBUMIN 4.0   BILITOT 0.9       Imaging:  MRI abd 2/28/23 and CT AP 4/24/23 reviewed.    Assessment/Plan:   42 yo F with bilateral TOA. Will undergo perc drainage of the larger collection (tube) on the right today.    Sedation:  Sedation history: have not been any systemic reactions  ASA: 3 / Mallampati: 3  Sedation plan: Up to moderate (Versed, fentanyl)     Risks (including, but not limited to, pain, bleeding, infection, damage to nearby structures, treatment failure/recurrence, and the need for additional procedures), potential benefits, and alternatives were discussed with the patient. All questions were answered to the best of my abilities. The patient wishes to proceed. Written informed consent was obtained.      Israel Villanueva MD  [facility]  Pager [phone]

## 2023-04-28 NOTE — PLAN OF CARE
Pt awake and alert. Oriented x4. Pt administered medications per MAR. Glucose level monitored. Pain managed with medications as ordered. Pt transferred to IR for procedure.

## 2023-04-28 NOTE — H&P
GYNECOLOGY H&P NOTE  4/28/2023    Reason for consult: Tuboovarian abscess    History of present illness:  Chelsi Johnson 43 y.o. with PMH HTN, T2DM who was admitted for tubo-ovarian abscess. Pt started to have RLQ cramping 4 days prior to admission but started menstrual cycle at that time so thought it was menstrual cramps. Symptoms persisted despite menstrual cycle stopping and continued to get worse resulting in patient presenting to the ED. Has had subjective fevers, nausea and single episode of emesis. Currently sexually active with 1 male partner for years. Not using protection. No abnormal vaginal discharge, dysuria,  hematuria or pelvic pain.     Pt initially admitted to general surgery for concern for appendicitis. However, MRI obtained 4/28 with bilateral TOA (worse on the R) thus OB/Gyn was consulted.     ROS:  Gen- no weight loss, + fevers  HEENT- no congestion, sore throat  CV- no chest pain, palpitations  Resp- no SOB, cough  - no hematuria, dysuria, flank pain  GI- no diarrhea, constiaption  Gyn- No abnormal vaginal discharge, pelvic pain, malodorous discharge  Psych- no anxiety, depression    Allergies: Review of patient's allergies indicates:  No Known Allergies    Past Medical History:   Diagnosis Date    Diabetes mellitus     Hypertension        Past Surgical History:   Procedure Laterality Date    BREAST LUMPECTOMY Right     TUBAL LIGATION         MEDS:   No current facility-administered medications on file prior to encounter.     Current Outpatient Medications on File Prior to Encounter   Medication Sig Dispense Refill    ALBUTEROL INHL Inhale 2 puffs into the lungs as needed.      losartan (COZAAR) 25 MG tablet Take 25 mg by mouth once daily.      metFORMIN (GLUCOPHAGE) 1000 MG tablet Take 1,000 mg by mouth 2 (two) times daily with meals.      [DISCONTINUED] cetirizine (ZYRTEC) 10 MG tablet Take 1 tablet (10 mg total) by mouth once daily. 30 tablet 0    [DISCONTINUED] fluticasone propionate  "(FLONASE) 50 mcg/actuation nasal spray 1 spray (50 mcg total) by Each Nostril route 2 (two) times daily as needed for Rhinitis. 15 g 0    [DISCONTINUED] ketorolac (TORADOL) 10 mg tablet Take 1 tablet (10 mg total) by mouth every 8 (eight) hours as needed for Pain. 15 tablet 0       OB History    No obstetric history on file.         Social History     Socioeconomic History    Marital status:    Tobacco Use    Smoking status: Never     Passive exposure: Never    Smokeless tobacco: Never   Substance and Sexual Activity    Alcohol use: Never    Drug use: Never    Sexual activity: Yes     Partners: Male       History reviewed. No pertinent family history.    Vitals:  Vitals:    04/28/23 0155 04/28/23 0314 04/28/23 0401 04/28/23 0844   BP:  135/74  138/78   BP Location:  Left arm  Right arm   Patient Position:  Lying  Lying   Pulse:  90  94   Resp: 18 17  18   Temp:  98.7 °F (37.1 °C)  98.5 °F (36.9 °C)   TempSrc:  Oral  Oral   SpO2:  96%  99%   Weight:  103.4 kg (228 lb)     Height:  5' 4" (1.626 m) 5' 4" (1.626 m)          Physical Exam:   General: NAD, WNWD  CV: RRR, S1 and S2 present  Pulm: CTAB, breathing comfortably on RA  ABD: Soft, ND, TTP RUQ/RLQ/suprapubic, no rebound or guarding  Ext: No edema    Assessment:  Chelsi Johnson is a 43 y.o. female with PMH T2DM, HTN who was admitted for tubo-ovarian abscess.    Plan:    #TOA  - Daily CBC  - Will obtain urine GCCT  - Change zosyn to rocephin/doxy/flagyl  - Consult IR to evaluate if able to drain abscess. Okay to resume diet if no plan for intervention.   - If no clinical improvement with antibiotics will plan for salpingectomy   - Given pt appears to have gyn primary problem, will take over as primary for this patient.     #T2DM  - Hold home metformin while inpatient   - Plan to obtain Hgb A1C  - Daily CMP  - MDSSI with goal glucose 140-180  - Will start lantus 5U qHS given gluvose in 200's     #HTN  - Continue home losartan 25mg    Dispo: Home pending " clinical improvement    Jaleel Johnson DO  [facility], PGY-3

## 2023-04-28 NOTE — PLAN OF CARE
VIRTUAL NURSE:  Labs, notes, orders, and careplan reviewed. VN to be available as needed.     Problem: Adult Inpatient Plan of Care  Goal: Plan of Care Review  Outcome: Ongoing, Progressing  Goal: Patient-Specific Goal (Individualized)  Outcome: Ongoing, Progressing  Goal: Absence of Hospital-Acquired Illness or Injury  Outcome: Ongoing, Progressing  Goal: Optimal Comfort and Wellbeing  Outcome: Ongoing, Progressing  Goal: Readiness for Transition of Care  Outcome: Ongoing, Progressing     Problem: Bleeding (Appendectomy)  Goal: Absence of Bleeding  Outcome: Ongoing, Progressing     Problem: Bowel Motility Impaired (Appendectomy)  Goal: Effective Bowel Elimination  Outcome: Ongoing, Progressing     Problem: Fluid and Electrolyte Imbalance (Appendectomy)  Goal: Fluid and Electrolyte Balance  Outcome: Ongoing, Progressing     Problem: Infection (Appendectomy)  Goal: Absence of Infection Signs and Symptoms  Outcome: Ongoing, Progressing     Problem: Ongoing Anesthesia Effects (Appendectomy)  Goal: Anesthesia/Sedation Recovery  Outcome: Ongoing, Progressing     Problem: Pain (Appendectomy)  Goal: Acceptable Pain Control  Outcome: Ongoing, Progressing     Problem: Postoperative Nausea and Vomiting (Appendectomy)  Goal: Nausea and Vomiting Relief  Outcome: Ongoing, Progressing     Problem: Postoperative Urinary Retention (Appendectomy)  Goal: Effective Urinary Elimination  Outcome: Ongoing, Progressing     Problem: Respiratory Compromise (Appendectomy)  Goal: Effective Oxygenation and Ventilation  Outcome: Ongoing, Progressing     Problem: Fall Injury Risk  Goal: Absence of Fall and Fall-Related Injury  Outcome: Ongoing, Progressing     Problem: Diabetes Comorbidity  Goal: Blood Glucose Level Within Targeted Range  Outcome: Ongoing, Progressing     Problem: Asthma Comorbidity  Goal: Maintenance of Asthma Control  Outcome: Ongoing, Progressing     Problem: Hypertension Comorbidity  Goal: Blood Pressure in Desired  Range  Outcome: Ongoing, Progressing

## 2023-04-28 NOTE — ANESTHESIA PROCEDURE NOTES
Intubation    Date/Time: 4/28/2023 6:17 PM  Performed by: Epi Pressley CRNA  Authorized by: Edyta Rangel MD     Intubation:     Induction:  Intravenous    Intubated:  Postinduction    Mask Ventilation:  Easy with oral airway    Attempts:  1    Attempted By:  CRNA    Method of Intubation:  Direct    Blade:  Macias 2    Laryngeal View Grade: Grade I - full view of cords      Difficult Airway Encountered?: No      Complications:  None    Airway Device:  Oral endotracheal tube    Airway Device Size:  7.5    Style/Cuff Inflation:  Cuffed (inflated to minimal occlusive pressure)    Tube secured:  22    Secured at:  The lips    Placement Verified By:  Capnometry    Complicating Factors:  None    Findings Post-Intubation:  BS equal bilateral and atraumatic/condition of teeth unchanged

## 2023-04-28 NOTE — SEDATION DOCUMENTATION
IR R groin sheath removed, manual pressure held for 5 minutes, H/H 3.9/13.1 notified Dr. Baird of result, new order for 2 units prepared PRBCs

## 2023-04-28 NOTE — PROCEDURES
Radiology Post-Procedure Note    Pre Op Diagnosis: pelvic bleed  Post Op Diagnosis: Same    Procedure: embolization of R circumflex iliac artery    Procedure performed by: Carson Baird MD    Written Informed Consent Obtained: Yes  Specimen Removed: NO  Estimated Blood Loss: Minimal    Findings:   Pelvic bleed arising from R circumflex iliac artery.  This was embolized with minimal gelfoam slurry and coils.  No further flow through vessel or additional extravasation    TO ICU for resuscitation and transfusion.  Patient remains intubated.

## 2023-04-28 NOTE — PLAN OF CARE
SW to see pt at bedside. Pt being seen by medical staff and SW to assess at later time.     No future appointments.    GALILEO Caraballo Case Management  [phone]

## 2023-04-28 NOTE — SEDATION DOCUMENTATION
IR embolization, anesthesia case, emergency prep, groin clipped, pedal pulse +2, new PIV 20 G started in left ac, continue to monitor

## 2023-04-28 NOTE — ANESTHESIA PREPROCEDURE EVALUATION
04/28/2023  Chelsi Johnson is a 43 y.o., female.    Lab Results   Component Value Date    WBC 11.88 04/28/2023    HGB 11.2 (L) 04/28/2023    HCT 35.8 (L) 04/28/2023    MCV 91 04/28/2023     04/28/2023         Pre-op Assessment    I have reviewed the Patient Summary Reports.    I have reviewed the NPO Status.   I have reviewed the Medications.     Review of Systems  Anesthesia Hx:  No problems with previous Anesthesia    Hematology/Oncology:  Hematology Normal   Oncology Normal     EENT/Dental:EENT/Dental Normal   Cardiovascular:   Hypertension    Pulmonary:  Pulmonary Normal    Renal/:  Renal/ Normal     Hepatic/GI:  Hepatic/GI Normal    Musculoskeletal:  Musculoskeletal Normal Calcaneal spur   OB/GYN/PEDS:  Tubo-ovarian abscess   Neurological:  Neurology Normal    Endocrine:   Diabetes, type 2  Obesity / BMI > 30  Dermatological:  Skin Normal    Psych:  Psychiatric Normal           Physical Exam  General: Well nourished, Confusion, Lethargic and Somnolent    Airway:  Mallampati: III   Mouth Opening: Normal  TM Distance: Normal  Neck ROM: Normal ROM    Dental:  Some missing teeth  Heart:  Rate: Normal  Rhythm: Regular Rhythm    Abdomen:  Tenderness        Anesthesia Plan  Type of Anesthesia, risks & benefits discussed:    Anesthesia Type: Gen ETT  Intra-op Monitoring Plan: Standard ASA Monitors  Post Op Pain Control Plan: multimodal analgesia and IV/PO Opioids PRN  Induction:  IV  Airway Plan: Direct, Post-Induction  Informed Consent: Informed consent signed with the Patient and all parties understand the risks and agree with anesthesia plan.  All questions answered.   ASA Score: 3 Emergent    Ready For Surgery From Anesthesia Perspective.     .

## 2023-04-28 NOTE — PLAN OF CARE
Spoke with , Hieu, by phone. Updated and consent for angiography and embolization obtained.      Israel Villanueva MD  [facility]  Pager [phone]

## 2023-04-28 NOTE — ED PROVIDER NOTES
Encounter Date: 4/27/2023       History     Chief Complaint   Patient presents with    Abdominal Pain     RLQ abdominal pain since yesterday, nausea, no vomiting, no urinary, LBM yesterday      43-year-old female presenting to ED with complaints of focal right lower quadrant abdominal discomfort since yesterday, worse today with associated nausea.  Patient reports she has been constipated for the last 2 days, with no history of constipation in the past.  Patient denies any dietary changes.  Patient denies any vomit or urinary symptoms.  LMP ended 2 days ago.  Patient does admit to history of prior tubal ligation.  No known recent sick contacts.  No alleviating factors noted.  No other complaints at this time.    The history is provided by the patient. No  was used.   Review of patient's allergies indicates:  No Known Allergies  Past Medical History:   Diagnosis Date    Diabetes mellitus     Hypertension      Past Surgical History:   Procedure Laterality Date    BREAST LUMPECTOMY Right     TUBAL LIGATION       History reviewed. No pertinent family history.  Social History     Tobacco Use    Smoking status: Never     Passive exposure: Never    Smokeless tobacco: Never   Substance Use Topics    Alcohol use: Never    Drug use: Never     Review of Systems   Constitutional:  Negative for chills, diaphoresis, fatigue and fever.   HENT:  Negative for congestion, ear pain, sinus pain, sore throat and trouble swallowing.    Eyes:  Negative for photophobia, pain, redness and visual disturbance.   Respiratory:  Negative for cough, choking, chest tightness, shortness of breath, wheezing and stridor.    Cardiovascular:  Negative for chest pain, palpitations and leg swelling.   Gastrointestinal:  Positive for abdominal pain, constipation and nausea. Negative for diarrhea and vomiting.   Endocrine: Negative.    Genitourinary:  Negative for decreased urine volume, difficulty urinating, dysuria, flank pain,  hematuria, urgency, vaginal bleeding, vaginal discharge and vaginal pain.   Musculoskeletal:  Negative for back pain, myalgias and neck pain.   Skin: Negative.    Allergic/Immunologic: Negative.    Neurological:  Negative for dizziness, weakness and headaches.   Hematological: Negative.    Psychiatric/Behavioral: Negative.     All other systems reviewed and are negative.    Physical Exam     Initial Vitals [04/27/23 1819]   BP Pulse Resp Temp SpO2   (!) 144/84 103 18 98.8 °F (37.1 °C) 100 %      MAP       --         Physical Exam    Nursing note and vitals reviewed.  Constitutional: Vital signs are normal. She appears well-developed and well-nourished. She is not diaphoretic. No distress.   43-year-old female lying in bed appearing uncomfortable although no acute distress, nontoxic, alert and oriented, breathing normally   HENT:   Head: Normocephalic and atraumatic.   Right Ear: Hearing and external ear normal.   Left Ear: Hearing and external ear normal.   Nose: Nose normal.   Eyes: Conjunctivae and EOM are normal. Pupils are equal, round, and reactive to light.   Neck: Trachea normal. Neck supple.   Normal range of motion.  Cardiovascular:  Normal rate, regular rhythm and normal pulses.           Pulmonary/Chest: Effort normal. No accessory muscle usage. No tachypnea. No respiratory distress.   Abdominal: Abdomen is soft. She exhibits no distension. There is abdominal tenderness.   Abdomen soft throughout, moderate tenderness with guarding to the right lower quadrant.  No abdominal rigidity noted. There is no rigidity, no rebound and no guarding.   Musculoskeletal:         General: Normal range of motion.      Cervical back: Normal range of motion and neck supple.     Neurological: She is alert and oriented to person, place, and time. She has normal strength. She displays no tremor. She exhibits normal muscle tone. She displays no seizure activity. Coordination normal. GCS score is 15. GCS eye subscore is 4. GCS  verbal subscore is 5. GCS motor subscore is 6.   Skin: Skin is warm and dry. Capillary refill takes less than 2 seconds.       ED Course   Procedures  Labs Reviewed   CBC W/ AUTO DIFFERENTIAL - Abnormal; Notable for the following components:       Result Value    WBC 14.09 (*)     Gran # (ANC) 9.7 (*)     Immature Grans (Abs) 0.06 (*)     All other components within normal limits   COMPREHENSIVE METABOLIC PANEL - Abnormal; Notable for the following components:    Sodium 133 (*)     Glucose 318 (*)     BUN 6 (*)     Anion Gap 6 (*)     All other components within normal limits   URINALYSIS - Abnormal; Notable for the following components:    Glucose, UA 3+ (*)     Ketones, UA Trace (*)     All other components within normal limits    Narrative:     Collection Type->Urine, Clean Catch   LIPASE   LACTIC ACID, PLASMA   URINALYSIS MICROSCOPIC    Narrative:     Collection Type->Urine, Clean Catch   POCT URINE PREGNANCY          Imaging Results              CT Abdomen Pelvis With Contrast (Final result)  Result time 04/27/23 20:39:21      Final result by Lobo Tobias MD (04/27/23 20:39:21)                   Impression:      Enlarged multi fibroid uterus.  Recommend clinical correlation for fibroid degeneration.  Recommend follow-up MRI with contrast for further evaluation on a nonemergent basis    Fatty infiltration liver with hepatomegaly    There is a dilated cystic structure with peripheral enhancement in the right lower quadrant measuring up to 24 mm in diameter may relate to an inflamed appendix.  Differential diagnosis includes dilated right-sided fallopian tubes which is not well delineated.  Additional consideration includes oral contrast administration and repeat CT.    All CT scans at this facility use dose modulation, iterative reconstruction, and/or weight based dosing when appropriate to reduce radiation dose to as low as reasonably achievable.      Electronically signed by: Lobo  Jatinder  Date:    04/27/2023  Time:    20:39               Narrative:    EXAMINATION:  CT ABDOMEN PELVIS WITH CONTRAST    CLINICAL HISTORY:  RLQ abdominal pain (Age >= 14y);    TECHNIQUE:  Low dose axial images, sagittal and coronal reformations were obtained from the lung bases to the pubic symphysis following the IV administration of 100 mL of Omnipaque 350.    COMPARISON:  None    FINDINGS:  Heart: Normal size as far as seen. No effusion as far as seen.    Lung Bases: Clear.    Liver: Fatty infiltration of liver with hepatomegaly..  No focal lesions.    Gallbladder: No calcified gallstones.    Bile Ducts: No dilatation.    Pancreas: No mass. No peripancreatic fat stranding.    Spleen: Normal.    Adrenals: Normal.    Kidneys/Ureters: Normal enhancement.  No mass or  hydroureteronephrosis.    Bladder: No wall thickening.    Reproductive organs: Heterogeneous clean hand sing enlarged multi fibroid uterus.    GI Tract/Mesentery: No evidence of bowel obstruction or inflammation.    There is a dilated cystic structure with peripheral enhancement in the right lower quadrant measuring up to 24 mm in diameter may relate to an inflamed appendix. Differential diagnosis includes dilated right-sided fallopian tubes which is not well delineated. Additional consideration includes oral contrast administration and repeat CT.    Peritoneal Space: No ascites or free air.    Retroperitoneum: No significant adenopathy.    Abdominal wall: Normal.    Vasculature: No aneurysm.    Bones: No acute fracture. No suspicious lytic or sclerotic lesions.                                       Medications   piperacillin-tazobactam (ZOSYN) 4.5 g in dextrose 5 % in water (D5W) 5 % 100 mL IVPB (MB+) (4.5 g Intravenous New Bag 4/27/23 2149)   sodium chloride 0.9% bolus 1,000 mL 1,000 mL (0 mLs Intravenous Stopped 4/27/23 2040)   promethazine injection 12.5 mg (12.5 mg Intramuscular Given 4/27/23 1846)   morphine injection 4 mg (4 mg Intravenous Given  4/27/23 1903)   iohexoL (OMNIPAQUE 350) injection 100 mL (100 mLs Intravenous Given 4/27/23 2018)   morphine injection 4 mg (4 mg Intravenous Given 4/27/23 2134)   ondansetron injection 4 mg (4 mg Intravenous Given 4/27/23 2135)                 ED Course as of 04/27/23 2155   Thu Apr 27, 2023   1920 WBC(!): 14.09 []   2055 Left VM for Dr. Cornell on call for GEN SURG []   2122 Spoke with Dr. Cornell on-call with General surgery, recommends urgent transfer of patient to [facility] so he can perform appendectomy tonight.  Patient agreeable to care plan. []   2127 Spoke with Dr. Al at [facility], accepts/aware of patient coming for transfer for Dr. Cornell GEN SURG to perform appendectomy tonight. []      ED Course User Index  [] Megan Salmeron PA-C                 Clinical Impression:   Final diagnoses:  [R10.31] RLQ abdominal pain  [K35.30] Acute appendicitis with localized peritonitis, without perforation, abscess, or gangrene (Primary)        ED Disposition Condition    Transfer to Another Facility Stable            PATIENT SEEN BY [facility] ONLY. CASE DISCUSSED WITH ATTENDING DR. MCKEON    Discussed care plan with patient.  Discussed lab workup showing leukocytosis, otherwise stable.  CT imaging of abdomen showing possible acute appendicitis and multi fibroid uterus.  Clinical examination focal right lower quadrant moderate tenderness concerning for acute appendicitis with associated nausea.  Discussed with on-call General surgery, recommended transfer to [facility] for bedside evaluation and planned appendectomy, patient agreeable to care plan.  IV antibiotics ordered, pain control ordered, patient is stable, all questions answered.        Megan Salmeron PA-C  04/27/23 2156

## 2023-04-28 NOTE — H&P
History           Chief Complaint   Patient presents with    Abdominal Pain       RLQ abdominal pain since yesterday, nausea, no vomiting, no urinary, LBM yesterday       43-year-old female presenting to ED with complaints of focal right lower quadrant abdominal discomfort since yesterday, worse today with associated nausea.  Patient reports she has been constipated for the last 2 days, with no history of constipation in the past.  Patient denies any dietary changes.  Patient denies any vomit or urinary symptoms.  LMP ended 2 days ago.  Patient does admit to history of prior tubal ligation.  No known recent sick contacts.  No alleviating factors noted.  No other complaints at this time.     The history is provided by the patient. No  was used.   Review of patient's allergies indicates:  No Known Allergies       Past Medical History:   Diagnosis Date    Diabetes mellitus      Hypertension              Past Surgical History:   Procedure Laterality Date    BREAST LUMPECTOMY Right      TUBAL LIGATION          History reviewed. No pertinent family history.  Social History            Tobacco Use    Smoking status: Never       Passive exposure: Never    Smokeless tobacco: Never   Substance Use Topics    Alcohol use: Never    Drug use: Never      Review of Systems   Constitutional:  Negative for chills, diaphoresis, fatigue and fever.   HENT:  Negative for congestion, ear pain, sinus pain, sore throat and trouble swallowing.    Eyes:  Negative for photophobia, pain, redness and visual disturbance.   Respiratory:  Negative for cough, choking, chest tightness, shortness of breath, wheezing and stridor.    Cardiovascular:  Negative for chest pain, palpitations and leg swelling.   Gastrointestinal:  Positive for abdominal pain, constipation and nausea. Negative for diarrhea and vomiting.   Endocrine: Negative.    Genitourinary:  Negative for decreased urine volume, difficulty urinating, dysuria, flank pain,  hematuria, urgency, vaginal bleeding, vaginal discharge and vaginal pain.   Musculoskeletal:  Negative for back pain, myalgias and neck pain.   Skin: Negative.    Allergic/Immunologic: Negative.    Neurological:  Negative for dizziness, weakness and headaches.   Hematological: Negative.    Psychiatric/Behavioral: Negative.     All other systems reviewed and are negative.     Physical Exam             Initial Vitals [04/27/23 1819]   BP Pulse Resp Temp SpO2   (!) 144/84 103 18 98.8 °F (37.1 °C) 100 %       MAP           --              Physical Exam     Nursing note and vitals reviewed.  Constitutional: Vital signs are normal. She appears well-developed and well-nourished. She is not diaphoretic. No distress.   43-year-old female lying in bed appearing uncomfortable although no acute distress, nontoxic, alert and oriented, breathing normally   HENT:   Head: Normocephalic and atraumatic.   Right Ear: Hearing and external ear normal.   Left Ear: Hearing and external ear normal.   Nose: Nose normal.   Eyes: Conjunctivae and EOM are normal. Pupils are equal, round, and reactive to light.   Neck: Trachea normal. Neck supple.   Normal range of motion.  Cardiovascular:  Normal rate, regular rhythm and normal pulses.           Pulmonary/Chest: Effort normal. No accessory muscle usage. No tachypnea. No respiratory distress.   Abdominal: Abdomen is soft. She exhibits no distension. There is abdominal tenderness.   Abdomen soft throughout, moderate tenderness with guarding to the right lower quadrant.  No abdominal rigidity noted. There is no rigidity, no rebound and no guarding.   Musculoskeletal:         General: Normal range of motion.      Cervical back: Normal range of motion and neck supple.      Neurological: She is alert and oriented to person, place, and time. She has normal strength. She displays no tremor. She exhibits normal muscle tone. She displays no seizure activity. Coordination normal. GCS score is 15. GCS eye  subscore is 4. GCS verbal subscore is 5. GCS motor subscore is 6.   Skin: Skin is warm and dry. Capillary refill takes less than 2 seconds.         ED Course   Procedures        Labs Reviewed   CBC W/ AUTO DIFFERENTIAL - Abnormal; Notable for the following components:       Result Value      WBC 14.09 (*)       Gran # (ANC) 9.7 (*)       Immature Grans (Abs) 0.06 (*)       All other components within normal limits   COMPREHENSIVE METABOLIC PANEL - Abnormal; Notable for the following components:     Sodium 133 (*)       Glucose 318 (*)       BUN 6 (*)       Anion Gap 6 (*)       All other components within normal limits   URINALYSIS - Abnormal; Notable for the following components:     Glucose, UA 3+ (*)       Ketones, UA Trace (*)       All other components within normal limits     Narrative:      Collection Type->Urine, Clean Catch   LIPASE   LACTIC ACID, PLASMA   URINALYSIS MICROSCOPIC     Narrative:      Collection Type->Urine, Clean Catch   POCT URINE PREGNANCY            Imaging Results                  CT Abdomen Pelvis With Contrast (Final result)  Result time 04/27/23 20:39:21            Final result by Lobo Tobias MD (04/27/23 20:39:21)                           Impression:        Enlarged multi fibroid uterus.  Recommend clinical correlation for fibroid degeneration.  Recommend follow-up MRI with contrast for further evaluation on a nonemergent basis     Fatty infiltration liver with hepatomegaly     There is a dilated cystic structure with peripheral enhancement in the right lower quadrant measuring up to 24 mm in diameter may relate to an inflamed appendix.  Differential diagnosis includes dilated right-sided fallopian tubes which is not well delineated.  Additional consideration includes oral contrast administration and repeat CT.     All CT scans at this facility use dose modulation, iterative reconstruction, and/or weight based dosing when appropriate to reduce radiation dose to as low as  reasonably achievable.        Electronically signed by:        Lobo Tobias  Date:                                        04/27/2023  Time:                                                20:39                     Narrative:     EXAMINATION:  CT ABDOMEN PELVIS WITH CONTRAST     CLINICAL HISTORY:  RLQ abdominal pain (Age >= 14y);     TECHNIQUE:  Low dose axial images, sagittal and coronal reformations were obtained from the lung bases to the pubic symphysis following the IV administration of 100 mL of Omnipaque 350.     COMPARISON:  None     FINDINGS:  Heart: Normal size as far as seen. No effusion as far as seen.     Lung Bases: Clear.     Liver: Fatty infiltration of liver with hepatomegaly..  No focal lesions.     Gallbladder: No calcified gallstones.     Bile Ducts: No dilatation.     Pancreas: No mass. No peripancreatic fat stranding.     Spleen: Normal.     Adrenals: Normal.     Kidneys/Ureters: Normal enhancement.  No mass or  hydroureteronephrosis.     Bladder: No wall thickening.     Reproductive organs: Heterogeneous clean hand sing enlarged multi fibroid uterus.     GI Tract/Mesentery: No evidence of bowel obstruction or inflammation.     There is a dilated cystic structure with peripheral enhancement in the right lower quadrant measuring up to 24 mm in diameter may relate to an inflamed appendix. Differential diagnosis includes dilated right-sided fallopian tubes which is not well delineated. Additional consideration includes oral contrast administration and repeat CT.     Peritoneal Space: No ascites or free air.     Retroperitoneum: No significant adenopathy.     Abdominal wall: Normal.     Vasculature: No aneurysm.     Bones: No acute fracture. No suspicious lytic or sclerotic lesions.                                               Medications   piperacillin-tazobactam (ZOSYN) 4.5 g in dextrose 5 % in water (D5W) 5 % 100 mL IVPB (MB+) (4.5 g Intravenous New Bag 4/27/23 1713)   sodium chloride 0.9% bolus  1,000 mL 1,000 mL (0 mLs Intravenous Stopped 4/27/23 2040)   promethazine injection 12.5 mg (12.5 mg Intramuscular Given 4/27/23 1846)   morphine injection 4 mg (4 mg Intravenous Given 4/27/23 1903)   iohexoL (OMNIPAQUE 350) injection 100 mL (100 mLs Intravenous Given 4/27/23 2018)   morphine injection 4 mg (4 mg Intravenous Given 4/27/23 2134)   ondansetron injection 4 mg (4 mg Intravenous Given 4/27/23 2135)                Imp: macute appendicitis, fibrois\d uterus , obesity, dm  Plan: check MRI , for lap appendectomy today,            1920 WBC(!): 14.09 []   2055 Left VM for Dr. Cornell on call for GEN SURG []   2122 Spoke with Dr. Cornell on-call with General surgery, recommends urgent transfer of patient to [facility] so he can perform appendectomy tonight.  Patient agreeable to care plan. []   2127 Spoke with Dr. Al at [facility], accepts/aware of patient coming for transfer for Dr. Cornell GEN SURG to perform appendectomy tonight. []       ED Course User Index  [] Megan Salmeron PA-C             Clinical Impression:   Final diagnoses:  [R10.31] RLQ abdominal pain  [K35.30] Acute appendicitis with localized peritonitis, without perforation, abscess, or gangrene (Primary)       ED Disposition Condition     Transfer to Another Facility Stable                PATIENT SEEN BY [facility] ONLY. CASE DISCUSSED WITH ATTENDING DR. MCKEON     Discussed care plan with patient.  Discussed lab workup showing leukocytosis, otherwise stable.  CT imaging of abdomen showing possible acute appendicitis and multi fibroid uterus.  Clinical examination focal right lower quadrant moderate tenderness concerning for acute appendicitis with associated nausea.  Discussed with on-call General surgery, recommended transfer to [facility] for bedside evaluation and planned appendectomy, patient agreeable to care plan.  IV antibiotics ordered, pain control ordered, patient is stable, all questions answered.     Megan  SHASHANK Salmeron PA-C  04/27/23 5165           Revision History

## 2023-04-28 NOTE — SEDATION DOCUMENTATION
"Patient experienced an acute intra-abdominal bleed while undergoing an abscess drainage procedure in CT. Baseline vitals were HR 80-90's, -160's/70's, RR mid 20's, ETCO2 mid 30's, POX 95-97 on room air. Dr. Villanueva requested a series of CT scans and a CTA which revealed an incremental increases in the volume of blood. Correspondingly, patient was experiencing and increase in pain. She was given a total of 2mg of dilaudid, NS IVF bolus wide open, and transferred to IR table. When transferring from CT to IR, patient became pale and diaphoretic. She stated "something is not right, please help me", and BP was noted to be 115/76 - a notable decrease in from systolic range up to that point. She was able to give us her father's phone number as well as her cell phone code with permission to access it in order to contact her . IR was unable to get a hold of family initially, therefore a two physician emergency phone consent was obtained. Anesthesia was contacted and met patient in IR suite. Patient was prepped for angiogram and Dr. Baird arrived to assume care of patient. Kirti, nursing house supervisor arrived and was notified of ICU bed need, she also assisted in contacting family. Once we were able to reach the family, they were updated on patient's status. IRVING Guzman from ICU came to assist in patient's care. PRBCs and Platelets were ordered STAT along with type and screen, CBC, and coags. Of note, patient's blood appeared diluted when labs were obtained and a second PIV was started. Report given to IRVING Juan who has assumed care of patient for angiogram procedure.  "

## 2023-04-28 NOTE — PROCEDURES
Interventional Radiology Immediate Post-Procedure Note    Pre-Op Diagnosis: TOA  Post-Op Diagnosis: TOA, hemorrhage    Procedure: CT-guided drain placement (unsuccessful)    Procedure performed by: Israel Villanueva MD  Assistants: None    Estimated Blood Loss: Minimal  Specimen Removed: No: NA    Findings/description of procedure:  Was able to access and coil a wire within the right-sided TOA but unable get a drain to form within the collection. A small intra-abdominal hematoma was noted almost immediately which enlarged significantly over the next 30 min, so a CTA was obtained which demonstrated an even larger hematoma and active arterial bleeding in the RLQ. D/w Dr. Sampson by phone and consensus was to perform emergent angio and embolization. Unable to reach Hieu, so two physician consent obtained. Pt currently HD stable but in pain not controled by moderate sedation.    Please see full dictated procedure report for additional details and recommendations.    Plan:  Emergent angio and embolization now with anesthesia.  1 L NS bolus and type and screen.      Israel Villanueva MD  [facility]  Pager [phone]

## 2023-04-29 LAB
ALBUMIN SERPL BCP-MCNC: 2.7 G/DL (ref 3.5–5.2)
ALLENS TEST: ABNORMAL
ALP SERPL-CCNC: 62 U/L (ref 55–135)
ALT SERPL W/O P-5'-P-CCNC: 11 U/L (ref 10–44)
ANION GAP SERPL CALC-SCNC: 12 MMOL/L (ref 8–16)
AST SERPL-CCNC: 13 U/L (ref 10–40)
BASOPHILS # BLD AUTO: 0.01 K/UL (ref 0–0.2)
BASOPHILS NFR BLD: 0.1 % (ref 0–1.9)
BILIRUB SERPL-MCNC: 0.9 MG/DL (ref 0.1–1)
BUN SERPL-MCNC: 4 MG/DL (ref 6–20)
CALCIUM SERPL-MCNC: 8.4 MG/DL (ref 8.7–10.5)
CHLORIDE SERPL-SCNC: 105 MMOL/L (ref 95–110)
CO2 SERPL-SCNC: 18 MMOL/L (ref 23–29)
CREAT SERPL-MCNC: 0.7 MG/DL (ref 0.5–1.4)
DELSYS: ABNORMAL
DIFFERENTIAL METHOD: ABNORMAL
EOSINOPHIL # BLD AUTO: 0 K/UL (ref 0–0.5)
EOSINOPHIL NFR BLD: 0.1 % (ref 0–8)
ERYTHROCYTE [DISTWIDTH] IN BLOOD BY AUTOMATED COUNT: 12.9 % (ref 11.5–14.5)
ERYTHROCYTE [DISTWIDTH] IN BLOOD BY AUTOMATED COUNT: 13.1 % (ref 11.5–14.5)
ERYTHROCYTE [DISTWIDTH] IN BLOOD BY AUTOMATED COUNT: 13.1 % (ref 11.5–14.5)
ERYTHROCYTE [SEDIMENTATION RATE] IN BLOOD BY WESTERGREN METHOD: 24 MM/H
EST. GFR  (NO RACE VARIABLE): >60 ML/MIN/1.73 M^2
FIO2: 30
GLUCOSE SERPL-MCNC: 262 MG/DL (ref 70–110)
HCO3 UR-SCNC: 20.1 MMOL/L (ref 24–28)
HCT VFR BLD AUTO: 37.2 % (ref 37–48.5)
HCT VFR BLD AUTO: 39.1 % (ref 37–48.5)
HCT VFR BLD AUTO: 40.2 % (ref 37–48.5)
HCT VFR BLD CALC: 41 %PCV (ref 36–54)
HGB BLD-MCNC: 12 G/DL (ref 12–16)
HGB BLD-MCNC: 12.6 G/DL (ref 12–16)
HGB BLD-MCNC: 12.7 G/DL (ref 12–16)
HGB BLD-MCNC: 14 G/DL
IMM GRANULOCYTES # BLD AUTO: 0.05 K/UL (ref 0–0.04)
IMM GRANULOCYTES NFR BLD AUTO: 0.3 % (ref 0–0.5)
INR PPP: 1.1 (ref 0.8–1.2)
LYMPHOCYTES # BLD AUTO: 1.3 K/UL (ref 1–4.8)
LYMPHOCYTES NFR BLD: 8.7 % (ref 18–48)
MAGNESIUM SERPL-MCNC: 1.8 MG/DL (ref 1.6–2.6)
MCH RBC QN AUTO: 28.9 PG (ref 27–31)
MCH RBC QN AUTO: 29.1 PG (ref 27–31)
MCH RBC QN AUTO: 29.2 PG (ref 27–31)
MCHC RBC AUTO-ENTMCNC: 31.6 G/DL (ref 32–36)
MCHC RBC AUTO-ENTMCNC: 32.2 G/DL (ref 32–36)
MCHC RBC AUTO-ENTMCNC: 32.3 G/DL (ref 32–36)
MCV RBC AUTO: 90 FL (ref 82–98)
MCV RBC AUTO: 91 FL (ref 82–98)
MCV RBC AUTO: 91 FL (ref 82–98)
MODE: ABNORMAL
MONOCYTES # BLD AUTO: 0.9 K/UL (ref 0.3–1)
MONOCYTES NFR BLD: 6 % (ref 4–15)
NEUTROPHILS # BLD AUTO: 12.9 K/UL (ref 1.8–7.7)
NEUTROPHILS NFR BLD: 84.8 % (ref 38–73)
NRBC BLD-RTO: 0 /100 WBC
PCO2 BLDA: 44.9 MMHG (ref 35–45)
PEEP: 5
PH SMN: 7.26 [PH] (ref 7.35–7.45)
PHOSPHATE SERPL-MCNC: 2.6 MG/DL (ref 2.7–4.5)
PLATELET # BLD AUTO: 309 K/UL (ref 150–450)
PLATELET # BLD AUTO: 314 K/UL (ref 150–450)
PLATELET # BLD AUTO: 326 K/UL (ref 150–450)
PMV BLD AUTO: 10.3 FL (ref 9.2–12.9)
PMV BLD AUTO: 10.4 FL (ref 9.2–12.9)
PMV BLD AUTO: 10.7 FL (ref 9.2–12.9)
PO2 BLDA: 34 MMHG (ref 40–60)
POC BE: -7 MMOL/L
POC SATURATED O2: 57 % (ref 95–100)
POC TCO2: 21 MMOL/L (ref 24–29)
POCT GLUCOSE: 170 MG/DL (ref 70–110)
POCT GLUCOSE: 178 MG/DL (ref 70–110)
POCT GLUCOSE: 242 MG/DL (ref 70–110)
POCT GLUCOSE: 263 MG/DL (ref 70–110)
POCT GLUCOSE: 270 MG/DL (ref 70–110)
POCT GLUCOSE: 286 MG/DL (ref 70–110)
POTASSIUM BLD-SCNC: 3.8 MMOL/L (ref 3.5–5.1)
POTASSIUM SERPL-SCNC: 3.9 MMOL/L (ref 3.5–5.1)
PROT SERPL-MCNC: 7 G/DL (ref 6–8.4)
PROTHROMBIN TIME: 11 SEC (ref 9–12.5)
RBC # BLD AUTO: 4.13 M/UL (ref 4–5.4)
RBC # BLD AUTO: 4.32 M/UL (ref 4–5.4)
RBC # BLD AUTO: 4.4 M/UL (ref 4–5.4)
SAMPLE: ABNORMAL
SODIUM BLD-SCNC: 136 MMOL/L (ref 136–145)
SODIUM SERPL-SCNC: 135 MMOL/L (ref 136–145)
VT: 380
WBC # BLD AUTO: 15.21 K/UL (ref 3.9–12.7)
WBC # BLD AUTO: 18.42 K/UL (ref 3.9–12.7)
WBC # BLD AUTO: 20.43 K/UL (ref 3.9–12.7)

## 2023-04-29 PROCEDURE — 84100 ASSAY OF PHOSPHORUS: CPT | Performed by: STUDENT IN AN ORGANIZED HEALTH CARE EDUCATION/TRAINING PROGRAM

## 2023-04-29 PROCEDURE — 25000003 PHARM REV CODE 250: Performed by: ANESTHESIOLOGY

## 2023-04-29 PROCEDURE — 94003 VENT MGMT INPAT SUBQ DAY: CPT

## 2023-04-29 PROCEDURE — 36415 COLL VENOUS BLD VENIPUNCTURE: CPT | Performed by: STUDENT IN AN ORGANIZED HEALTH CARE EDUCATION/TRAINING PROGRAM

## 2023-04-29 PROCEDURE — 63600175 PHARM REV CODE 636 W HCPCS: Performed by: STUDENT IN AN ORGANIZED HEALTH CARE EDUCATION/TRAINING PROGRAM

## 2023-04-29 PROCEDURE — 99233 SBSQ HOSP IP/OBS HIGH 50: CPT | Mod: ,,, | Performed by: OBSTETRICS & GYNECOLOGY

## 2023-04-29 PROCEDURE — 99233 PR SUBSEQUENT HOSPITAL CARE,LEVL III: ICD-10-PCS | Mod: ,,, | Performed by: OBSTETRICS & GYNECOLOGY

## 2023-04-29 PROCEDURE — 82803 BLOOD GASES ANY COMBINATION: CPT

## 2023-04-29 PROCEDURE — 11000001 HC ACUTE MED/SURG PRIVATE ROOM

## 2023-04-29 PROCEDURE — 27000221 HC OXYGEN, UP TO 24 HOURS

## 2023-04-29 PROCEDURE — 94761 N-INVAS EAR/PLS OXIMETRY MLT: CPT

## 2023-04-29 PROCEDURE — 63600175 PHARM REV CODE 636 W HCPCS: Performed by: EMERGENCY MEDICINE

## 2023-04-29 PROCEDURE — 85027 COMPLETE CBC AUTOMATED: CPT | Performed by: STUDENT IN AN ORGANIZED HEALTH CARE EDUCATION/TRAINING PROGRAM

## 2023-04-29 PROCEDURE — 99900035 HC TECH TIME PER 15 MIN (STAT)

## 2023-04-29 PROCEDURE — 85027 COMPLETE CBC AUTOMATED: CPT | Mod: 91 | Performed by: STUDENT IN AN ORGANIZED HEALTH CARE EDUCATION/TRAINING PROGRAM

## 2023-04-29 PROCEDURE — 25000003 PHARM REV CODE 250: Performed by: STUDENT IN AN ORGANIZED HEALTH CARE EDUCATION/TRAINING PROGRAM

## 2023-04-29 PROCEDURE — 85025 COMPLETE CBC W/AUTO DIFF WBC: CPT | Performed by: OBSTETRICS & GYNECOLOGY

## 2023-04-29 PROCEDURE — 85610 PROTHROMBIN TIME: CPT | Performed by: STUDENT IN AN ORGANIZED HEALTH CARE EDUCATION/TRAINING PROGRAM

## 2023-04-29 PROCEDURE — 99900017 HC EXTUBATION W/PARAMETERS (STAT)

## 2023-04-29 PROCEDURE — 36415 COLL VENOUS BLD VENIPUNCTURE: CPT | Performed by: OBSTETRICS & GYNECOLOGY

## 2023-04-29 PROCEDURE — 80053 COMPREHEN METABOLIC PANEL: CPT | Performed by: STUDENT IN AN ORGANIZED HEALTH CARE EDUCATION/TRAINING PROGRAM

## 2023-04-29 PROCEDURE — 25000003 PHARM REV CODE 250: Performed by: OBSTETRICS & GYNECOLOGY

## 2023-04-29 PROCEDURE — S0030 INJECTION, METRONIDAZOLE: HCPCS | Performed by: OBSTETRICS & GYNECOLOGY

## 2023-04-29 PROCEDURE — 83735 ASSAY OF MAGNESIUM: CPT | Performed by: STUDENT IN AN ORGANIZED HEALTH CARE EDUCATION/TRAINING PROGRAM

## 2023-04-29 RX ORDER — ACETAMINOPHEN 325 MG/1
650 TABLET ORAL EVERY 6 HOURS PRN
Status: DISCONTINUED | OUTPATIENT
Start: 2023-04-29 | End: 2023-04-30

## 2023-04-29 RX ORDER — KETOROLAC TROMETHAMINE 30 MG/ML
15 INJECTION, SOLUTION INTRAMUSCULAR; INTRAVENOUS EVERY 6 HOURS PRN
Status: DISCONTINUED | OUTPATIENT
Start: 2023-04-29 | End: 2023-04-29

## 2023-04-29 RX ORDER — MORPHINE SULFATE 2 MG/ML
2 INJECTION, SOLUTION INTRAMUSCULAR; INTRAVENOUS EVERY 4 HOURS PRN
Status: DISCONTINUED | OUTPATIENT
Start: 2023-04-29 | End: 2023-04-30

## 2023-04-29 RX ORDER — KETOROLAC TROMETHAMINE 30 MG/ML
15 INJECTION, SOLUTION INTRAMUSCULAR; INTRAVENOUS EVERY 6 HOURS PRN
Status: DISCONTINUED | OUTPATIENT
Start: 2023-04-29 | End: 2023-04-30

## 2023-04-29 RX ORDER — FAMOTIDINE 10 MG/ML
20 INJECTION INTRAVENOUS 2 TIMES DAILY
Status: DISCONTINUED | OUTPATIENT
Start: 2023-04-29 | End: 2023-04-29

## 2023-04-29 RX ORDER — IBUPROFEN 600 MG/1
600 TABLET ORAL EVERY 6 HOURS PRN
Status: DISCONTINUED | OUTPATIENT
Start: 2023-04-29 | End: 2023-04-29

## 2023-04-29 RX ORDER — SIMETHICONE 125 MG
125 TABLET,CHEWABLE ORAL EVERY 6 HOURS PRN
Status: DISCONTINUED | OUTPATIENT
Start: 2023-04-29 | End: 2023-04-30

## 2023-04-29 RX ORDER — OXYCODONE AND ACETAMINOPHEN 5; 325 MG/1; MG/1
1 TABLET ORAL EVERY 4 HOURS PRN
Status: DISCONTINUED | OUTPATIENT
Start: 2023-04-29 | End: 2023-05-03 | Stop reason: HOSPADM

## 2023-04-29 RX ORDER — MUPIROCIN 20 MG/G
OINTMENT TOPICAL 2 TIMES DAILY
Status: DISCONTINUED | OUTPATIENT
Start: 2023-04-29 | End: 2023-05-03 | Stop reason: HOSPADM

## 2023-04-29 RX ORDER — LOSARTAN POTASSIUM 25 MG/1
25 TABLET ORAL DAILY
Status: DISCONTINUED | OUTPATIENT
Start: 2023-04-29 | End: 2023-04-29

## 2023-04-29 RX ORDER — LOSARTAN POTASSIUM 50 MG/1
50 TABLET ORAL DAILY
Status: DISCONTINUED | OUTPATIENT
Start: 2023-04-30 | End: 2023-05-03 | Stop reason: HOSPADM

## 2023-04-29 RX ADMIN — PROPOFOL 50 MCG/KG/MIN: 10 INJECTION, EMULSION INTRAVENOUS at 06:04

## 2023-04-29 RX ADMIN — FENTANYL CITRATE 50 MCG: 50 INJECTION INTRAMUSCULAR; INTRAVENOUS at 04:04

## 2023-04-29 RX ADMIN — ACETAMINOPHEN 650 MG: 325 TABLET ORAL at 03:04

## 2023-04-29 RX ADMIN — ACETAMINOPHEN 650 MG: 325 TABLET ORAL at 11:04

## 2023-04-29 RX ADMIN — CHLORHEXIDINE GLUCONATE 0.12% ORAL RINSE 15 ML: 1.2 LIQUID ORAL at 09:04

## 2023-04-29 RX ADMIN — INSULIN ASPART 6 UNITS: 100 INJECTION, SOLUTION INTRAVENOUS; SUBCUTANEOUS at 12:04

## 2023-04-29 RX ADMIN — FENTANYL CITRATE 50 MCG: 50 INJECTION INTRAMUSCULAR; INTRAVENOUS at 08:04

## 2023-04-29 RX ADMIN — INSULIN DETEMIR 5 UNITS: 100 INJECTION, SOLUTION SUBCUTANEOUS at 12:04

## 2023-04-29 RX ADMIN — INSULIN DETEMIR 5 UNITS: 100 INJECTION, SOLUTION SUBCUTANEOUS at 10:04

## 2023-04-29 RX ADMIN — DOXYCYCLINE 100 MG: 100 INJECTION, POWDER, LYOPHILIZED, FOR SOLUTION INTRAVENOUS at 08:04

## 2023-04-29 RX ADMIN — INSULIN ASPART 6 UNITS: 100 INJECTION, SOLUTION INTRAVENOUS; SUBCUTANEOUS at 10:04

## 2023-04-29 RX ADMIN — INSULIN ASPART 4 UNITS: 100 INJECTION, SOLUTION INTRAVENOUS; SUBCUTANEOUS at 06:04

## 2023-04-29 RX ADMIN — INSULIN ASPART 2 UNITS: 100 INJECTION, SOLUTION INTRAVENOUS; SUBCUTANEOUS at 05:04

## 2023-04-29 RX ADMIN — DOXYCYCLINE 100 MG: 100 INJECTION, POWDER, LYOPHILIZED, FOR SOLUTION INTRAVENOUS at 09:04

## 2023-04-29 RX ADMIN — MUPIROCIN: 20 OINTMENT TOPICAL at 09:04

## 2023-04-29 RX ADMIN — LOSARTAN POTASSIUM 25 MG: 25 TABLET, FILM COATED ORAL at 08:04

## 2023-04-29 RX ADMIN — CEFTRIAXONE 1 G: 1 INJECTION, POWDER, FOR SOLUTION INTRAMUSCULAR; INTRAVENOUS at 12:04

## 2023-04-29 RX ADMIN — PROCHLORPERAZINE EDISYLATE 5 MG: 5 INJECTION INTRAMUSCULAR; INTRAVENOUS at 03:04

## 2023-04-29 RX ADMIN — METRONIDAZOLE 500 MG: 5 INJECTION, SOLUTION INTRAVENOUS at 12:04

## 2023-04-29 RX ADMIN — ONDANSETRON HYDROCHLORIDE 4 MG: 2 INJECTION, SOLUTION INTRAMUSCULAR; INTRAVENOUS at 12:04

## 2023-04-29 RX ADMIN — KETOROLAC TROMETHAMINE 15 MG: 30 INJECTION, SOLUTION INTRAMUSCULAR; INTRAVENOUS at 03:04

## 2023-04-29 RX ADMIN — PROPOFOL 50 MCG/KG/MIN: 10 INJECTION, EMULSION INTRAVENOUS at 03:04

## 2023-04-29 RX ADMIN — INSULIN ASPART 3 UNITS: 100 INJECTION, SOLUTION INTRAVENOUS; SUBCUTANEOUS at 01:04

## 2023-04-29 RX ADMIN — CHLORHEXIDINE GLUCONATE 0.12% ORAL RINSE 15 ML: 1.2 LIQUID ORAL at 08:04

## 2023-04-29 RX ADMIN — OXYCODONE HYDROCHLORIDE AND ACETAMINOPHEN 1 TABLET: 5; 325 TABLET ORAL at 01:04

## 2023-04-29 RX ADMIN — METRONIDAZOLE 500 MG: 5 INJECTION, SOLUTION INTRAVENOUS at 08:04

## 2023-04-29 RX ADMIN — MUPIROCIN: 20 OINTMENT TOPICAL at 10:04

## 2023-04-29 RX ADMIN — MORPHINE SULFATE 2 MG: 2 INJECTION, SOLUTION INTRAMUSCULAR; INTRAVENOUS at 07:04

## 2023-04-29 RX ADMIN — SIMETHICONE 125 MG: 125 TABLET, CHEWABLE ORAL at 05:04

## 2023-04-29 RX ADMIN — ONDANSETRON HYDROCHLORIDE 4 MG: 2 INJECTION, SOLUTION INTRAMUSCULAR; INTRAVENOUS at 09:04

## 2023-04-29 RX ADMIN — METRONIDAZOLE 500 MG: 5 INJECTION, SOLUTION INTRAVENOUS at 04:04

## 2023-04-29 NOTE — TRANSFER OF CARE
"Anesthesia Transfer of Care Note    Patient: Chelsi Johnson    Procedure(s) Performed: * No procedures listed *    Patient location: ICU    Anesthesia Type: general    Transport from OR: Continuous SpO2 monitoring in transport. Continuous ECG monitoring in transport. Upon arrival to PACU/ICU, patient attached to ventilator and auscultated to confirm bilateral breath sounds and adequate TV. Transported from OR intubated on 100% O2 by AMBU with adequate controlled ventilation    Post pain: adequate analgesia    Post assessment: no apparent anesthetic complications    Post vital signs: stable    Level of consciousness: awake and alert    Nausea/Vomiting: no nausea/vomiting    Complications: none    Transfer of care protocol was followedComments: Report given to ICU RNMegan; all questions answered      Last vitals:   Visit Vitals  /71 (BP Location: Right arm)   Pulse 85   Temp 36.7 °C (98 °F)   Resp (!) 30   Ht 5' 4" (1.626 m)   Wt 103.4 kg (228 lb)   LMP 04/21/2023   SpO2 100%   Breastfeeding No   BMI 39.14 kg/m²     "

## 2023-04-29 NOTE — CONSULTS
Consult note  [facility]    Consulted by: Cirilo Sampson MD  Reason for Consult: T2DM and HTN    History of Present Illness: Patient is a 43 y.o. female with a PMHx T2DM, HTN here for bilateral tubo-ovarian abscesses. IR attempted drainage of the right tubo-ovarian ovarian abscess but was complicated by extravasation of the right circumflex artery resulting in formation of large hematoma which required emergent embolization. Patient extubated on 04/28/2023 and remains admitted within the ICU for closer monitoring. Patient is receiving IV antibiotics for her TOA including ceftriaxone, doxycycline, and metronidazole. [facility] has been consulted for management of patient's type 2 diabetes and hypertension.    Patient endorses she is currently taking losartan 25 mg daily and metformin 1000 mg daily at home. Hemoglobin A1c on admission 10.0 and patient has maintained BG levels in the upper 200s requiring detemir and ssi. Patient currently on detemir 12 units nightly plus ssi.     Today, patient reports nausea, 1 episode of vomiting, and right lower quadrant abdominal pain. Denies any worsening abdominal distension, CP, or SOB. Patient unable to take PO pain medications this afternoon requiring IV morphine and IV Toradol use as needed.    PTA Medications   Medication Sig    ALBUTEROL INHL Inhale 2 puffs into the lungs as needed.    losartan (COZAAR) 25 MG tablet Take 25 mg by mouth once daily.    metFORMIN (GLUCOPHAGE) 1000 MG tablet Take 1,000 mg by mouth 2 (two) times daily with meals.       Review of patient's allergies indicates:  No Known Allergies    Past Medical History:   Diagnosis Date    Diabetes mellitus     Hypertension      Past Surgical History:   Procedure Laterality Date    BREAST LUMPECTOMY Right     TUBAL LIGATION       History reviewed. No pertinent family history.  Social History     Tobacco Use    Smoking status: Never     Passive exposure: Never    Smokeless tobacco: Never    Substance Use Topics    Alcohol use: Never    Drug use: Never       Review of Systems:   Review of Systems   Constitutional:  Positive for fever.   Respiratory:  Negative for shortness of breath.    Cardiovascular:  Negative for chest pain.   Gastrointestinal:  Positive for abdominal pain, nausea and vomiting.   Musculoskeletal:  Negative for back pain.   Neurological:  Negative for headaches.    OBJECTIVE:     Vital Signs (Most Recent)  Temp: 99.5 °F (37.5 °C) (04/29/23 0745)  Pulse: 94 (04/29/23 1600)  Resp: (!) 23 (04/29/23 1600)  BP: (!) 151/69 (04/29/23 1600)  SpO2: 95 % (04/29/23 1600)    Physical Exam:  Gen- WNWD, NAD  CV- tachycardic with normal sinus rhythm, no LE edema  Resp- breathing comfortably on RA, CTAB  Abd- significant tenderness within the right lower quadrant  Psych- logical thought process, answers questions appropriately     Laboratory  Lab Results   Component Value Date    WBC 15.21 (H) 04/29/2023    HGB 12.0 04/29/2023    HCT 37.2 04/29/2023    MCV 90 04/29/2023     04/29/2023      CMP  Sodium   Date Value Ref Range Status   04/29/2023 135 (L) 136 - 145 mmol/L Final     Potassium   Date Value Ref Range Status   04/29/2023 3.9 3.5 - 5.1 mmol/L Final     Chloride   Date Value Ref Range Status   04/29/2023 105 95 - 110 mmol/L Final     CO2   Date Value Ref Range Status   04/29/2023 18 (L) 23 - 29 mmol/L Final     Glucose   Date Value Ref Range Status   04/29/2023 262 (H) 70 - 110 mg/dL Final     BUN   Date Value Ref Range Status   04/29/2023 4 (L) 6 - 20 mg/dL Final     Creatinine   Date Value Ref Range Status   04/29/2023 0.7 0.5 - 1.4 mg/dL Final     Calcium   Date Value Ref Range Status   04/29/2023 8.4 (L) 8.7 - 10.5 mg/dL Final     Total Protein   Date Value Ref Range Status   04/29/2023 7.0 6.0 - 8.4 g/dL Final     Albumin   Date Value Ref Range Status   04/29/2023 2.7 (L) 3.5 - 5.2 g/dL Final     Total Bilirubin   Date Value Ref Range Status   04/29/2023 0.9 0.1 - 1.0 mg/dL Final      Comment:     For infants and newborns, interpretation of results should be based  on gestational age, weight and in agreement with clinical  observations.    Premature Infant recommended reference ranges:  Up to 24 hours.............<8.0 mg/dL  Up to 48 hours............<12.0 mg/dL  3-5 days..................<15.0 mg/dL  6-29 days.................<15.0 mg/dL       Alkaline Phosphatase   Date Value Ref Range Status   04/29/2023 62 55 - 135 U/L Final     AST   Date Value Ref Range Status   04/29/2023 13 10 - 40 U/L Final     ALT   Date Value Ref Range Status   04/29/2023 11 10 - 44 U/L Final     Anion Gap   Date Value Ref Range Status   04/29/2023 12 8 - 16 mmol/L Final        Lab Results   Component Value Date    INR 1.1 04/29/2023    INR 1.6 (H) 04/28/2023      No results for input(s): CPK, CPKMB, TROPONINI, MB in the last 168 hours.   No results for input(s): TROPONINI, CKTOTAL, CKMB in the last 168 hours.    BNP  No results for input(s): BNP in the last 168 hours.    Urinalysis  No results for input(s): COLORU, CLARITYU, SPECGRAV, PHUR, PROTEINUA, GLUCOSEU, BILIRUBINCON, BLOODU, WBCU, RBCU, BACTERIA, MUCUS, NITRITE, LEUKOCYTESUR, UROBILINOGEN, HYALINECASTS in the last 24 hours.   LAST HbA1c  Lab Results   Component Value Date    HGBA1C 10.0 (H) 04/28/2023       Diagnostic Results:  Labs: Reviewed  X-Ray: Reviewed    Imaging Results              MRI Abdomen-Pelvis w w/o Contrast (XPD) (Final result)  Result time 04/28/23 08:27:21      Final result by Chencho Joseph DO (04/28/23 08:27:21)                   Impression:      1. Inflamed, distended, fluid-filled tubular structure in the right adnexa/right lower quadrant, favored to represent a distended fallopian tube with hydrosalpinx/pyosalpinx.  Acute appendicitis is felt less likely.  2. Smaller distended tubular structure in the left adnexa, also concerning for hydrosalpinx/pyosalpinx.  3. Large fibroid uterus.  4. Hepatomegaly and hepatic  steatosis.      Electronically signed by: Chencho Joseph  Date:    04/28/2023  Time:    08:27               Narrative:    EXAMINATION:  MRI ABDOMEN-PELVIS W W/O CONTRAST (XPD)    CLINICAL HISTORY:  RLQ abdominal pain (Age >= 14y);    TECHNIQUE:  Multisequence, multiplanar MRI of the abdomen performed per liver protocol before and after administration of 10 mL Gadavist intravenous contrast.    COMPARISON:  CT of the abdomen and pelvis from 04/27/2023.    FINDINGS:  Lung bases: The lung bases are grossly clear.    Liver: The liver is enlarged and demonstrates diffuse loss of signal on out of phase imaging compatible with hepatic steatosis.  There are no focal hepatic lesions.    Biliary: No intrahepatic or extrahepatic biliary dilatation.    Gallbladder: The gallbladder is unremarkable without evidence of cholelithiasis, gallbladder wall thickening, or pericholecystic fluid.    Pancreas: Unremarkable.  No pancreatic ductal dilatation.    Spleen: Normal size.  No focal lesions.    Adrenal glands: Unremarkable.    Kidneys: No renal masses.  No hydronephrosis.    GI: Visualized bowel loops are unremarkable.    Pelvis: There is an inflamed dilated tubular structure in the right adnexa/right lower quadrant measuring up to approximately 2.3 cm in maximal diameter, with adjacent surrounding edema and fat stranding.  The tubular structure does not definitively arise from the cecum and is favored to represent a distended fallopian tube with hydrosalpinx/pyosalpinx.  The appendix is not definitively seen separate from this structure.  There is an additional smaller tubular structure in the left adnexa with a diameter of approximately 1.1 cm, without significant surrounding edema.    Reproductive: There are multiple large uterine fibroids.  There is trace free fluid in the pelvis.    Mesentery: No ascites. No focal fluid collection. No free air.    Body wall: Unremarkable.    Musculoskeletal: Marrow signal is unremarkable.                                        CT Abdomen Pelvis With Contrast (Final result)  Result time 04/27/23 20:39:21      Final result by Lobo Tobias MD (04/27/23 20:39:21)                   Impression:      Enlarged multi fibroid uterus.  Recommend clinical correlation for fibroid degeneration.  Recommend follow-up MRI with contrast for further evaluation on a nonemergent basis    Fatty infiltration liver with hepatomegaly    There is a dilated cystic structure with peripheral enhancement in the right lower quadrant measuring up to 24 mm in diameter may relate to an inflamed appendix.  Differential diagnosis includes dilated right-sided fallopian tubes which is not well delineated.  Additional consideration includes oral contrast administration and repeat CT.    All CT scans at this facility use dose modulation, iterative reconstruction, and/or weight based dosing when appropriate to reduce radiation dose to as low as reasonably achievable.      Electronically signed by: Lobo Tobias  Date:    04/27/2023  Time:    20:39               Narrative:    EXAMINATION:  CT ABDOMEN PELVIS WITH CONTRAST    CLINICAL HISTORY:  RLQ abdominal pain (Age >= 14y);    TECHNIQUE:  Low dose axial images, sagittal and coronal reformations were obtained from the lung bases to the pubic symphysis following the IV administration of 100 mL of Omnipaque 350.    COMPARISON:  None    FINDINGS:  Heart: Normal size as far as seen. No effusion as far as seen.    Lung Bases: Clear.    Liver: Fatty infiltration of liver with hepatomegaly..  No focal lesions.    Gallbladder: No calcified gallstones.    Bile Ducts: No dilatation.    Pancreas: No mass. No peripancreatic fat stranding.    Spleen: Normal.    Adrenals: Normal.    Kidneys/Ureters: Normal enhancement.  No mass or  hydroureteronephrosis.    Bladder: No wall thickening.    Reproductive organs: Heterogeneous clean hand sing enlarged multi fibroid uterus.    GI Tract/Mesentery: No evidence of  bowel obstruction or inflammation.    There is a dilated cystic structure with peripheral enhancement in the right lower quadrant measuring up to 24 mm in diameter may relate to an inflamed appendix. Differential diagnosis includes dilated right-sided fallopian tubes which is not well delineated. Additional consideration includes oral contrast administration and repeat CT.    Peritoneal Space: No ascites or free air.    Retroperitoneum: No significant adenopathy.    Abdominal wall: Normal.    Vasculature: No aneurysm.    Bones: No acute fracture. No suspicious lytic or sclerotic lesions.                                      ASSESSMENT/PLAN:   43 y.o.female has a past medical history of Diabetes mellitus and Hypertension. here for tubo-ovarian abscess complicated by extravasation of the right circumflex artery resulting in formation of large hematoma requiring emergent embolization. S/p extubation on 04/28/2023. Patient remains admitted within ICU currently hemodynamically stable.     #TOA  # Pelvic arterial bleed  - will plan to continue Rocephin, doxycycline, and Flagyl.  Per gyn, if TOA persists despite antibiotics then will consider plan for salpingectomy and possible oophorectomy.   - CBC this afternoon showing stable H&H and abdominal x-ray ordered showing multiple several scattered prominent air-filled small bowel loops throughout the abdomen and pelvis.  Will continue to monitor and perform serial abdominal exams overnight and in the morning. Will follow-up with morning CBC.  - for nausea can use IV Zofran  - for pain can use IV Toradol and IV morphine as needed. Patient currently not tolerating any medications by mouth.     #Essential Hypertension   - Home medications include losartan 25 mg daily.  Within the past 24 hours, patient with BP reading showing systolics in the 160-170s and diastolics in the 80s-90s.   - will plan to increase losartan to 50 mg daily.   - Can use IV hydralazine or labetolol PRN for  SYS > 180  - Can consider additional hypertensive agents if BP remains uncontrolled tomorrow     #T2DM  - hemoglobin A1c 10.0 on admission  Currently on insulin detemir 12 units nightly + ssi  Continue to titrate as needed to a blood glucose goal of 140-180 while inpatient     Family medicine will continue to follow. Please contact us if you have any further questions. Thank you for the consult.     Cristobal Maldonado DO  \[facility] [place] [facility]\"" Family Medicine, PGY-2  04/29/2023

## 2023-04-29 NOTE — SEDATION DOCUMENTATION
IR case completed, transported pt to ICU with anesthesia, RANDALL Rdz, Dr Chan, and [place] ICU RN, bedside report given to [facility] ICU, pulse intact pedal +2, R groin CDI, no hematoma, 2nd unit PRBCs infusing, pt remains intubated, care transferred to ICU, RN, IR care complete

## 2023-04-29 NOTE — EICU
"EICU Note    42 y/o female with a PMH of HTN and DM type 2 presents with complaints of right lower quadrant abdominal pain with nausea and vomiting. MRI of the abdomen revealed bilateral tubo-ovarian abscesses, right greater than left. Taken to IR for drainage of the right tubo-ovarian abscess. However unable to get a drain to form within the collection. Post the procedure the patient was noted to have a small intra-abdominal hematoma, which rapidly increased in size. CTA demonstrated a active arterial bleeding in the right lower quadrant. An emergent angiogram with embolization of the right circumflex iliac artery. The patient remained intubated post the procedure. Transfused 2 units PRBC.    Currently;    /71   Pulse 100   Temp (!) 101 °F (38.3 °C)   Resp (!) 24   Ht 5' 4" (1.626 m)   Wt 104 kg (229 lb 4.5 oz)   LMP 04/21/2023   SpO2 99%   Breastfeeding No   BMI 39.36 kg/m²     Labs:    CBC: WBC 20.43/ Hgb 11.2--->3.9--13.2--->12.7/ Hct 40.2/ Plts 314 K    BMP: Na 133/ K 3.9/ Cl 101/ CO2 26/ BUN 6/ Cr 0.62/ glucose 318    Chest Xray: Final reading  FINDINGS:  Endotracheal tube tip lies approximately 3 cm above the david. Nasogastric tube courses below the diaphragm, beyond the field of view.  The cardiomediastinal silhouette is not enlarged, magnified by technique..  There is no pleural effusion.  The trachea is midline. The lungs are symmetrically expanded bilaterally with coarse central hilar interstitial attenuation, accentuated by shallow inspiratory effort.  Early edema is a consideration..  No large focal consolidation seen.  There is no pneumothorax.  The osseous structures are remarkable for degenerative change..  Impression:  As above    MRI abdomen: Final reading  Impression:     1. Inflamed, distended, fluid-filled tubular structure in the right adnexa/right lower quadrant, favored to represent a distended fallopian tube with hydrosalpinx/pyosalpinx.  Acute appendicitis is felt less " likely.  2. Smaller distended tubular structure in the left adnexa, also concerning for hydrosalpinx/pyosalpinx.  3. Large fibroid uterus.  4. Hepatomegaly and hepatic steatosis.    Impression;    -Acute respiratory failure  -Tubo ovarian abscess  -DM  -HTN    Plan; Continue present management of IV antibiotics. Allow to remain on the ventilator overnight and in the AM weaning trials. Monitor H/H

## 2023-04-29 NOTE — EICU
Intervention Initiated From:  Bedside    Rodrigo intervened regarding:  Other    Nurse Notified:  Yes    Doctor Notified:  Yes    Comments: Call from bedside nurse, temp 101. Requesting med.  Dr GENA Marrufo messaged

## 2023-04-29 NOTE — PROGRESS NOTES
GYNECOLOGY PROGRESS NOTE  4/29/2023    Reason for consult: Tuboovarian abscess    Interval History: Pt went for IR drainage of TOA. Procedure unsuccessful and complicated by bleed of R iliac circumflex artery, successfully embolized. Pt remained intubated and was transferred to ICU for acute resuscitation and blood transfusion. Initial Hgb post-procedure was 3.9, however blood sample noted to be diluted. Pt received 2U pRBCs with improvement in Hgb to 12.6 this morning. Pt had isolated fever of 101 overnight.     ROS: Unable to obtain 2/2 pt intubated    Allergies: Review of patient's allergies indicates:  No Known Allergies    Past Medical History:   Diagnosis Date    Diabetes mellitus     Hypertension        Past Surgical History:   Procedure Laterality Date    BREAST LUMPECTOMY Right     TUBAL LIGATION         MEDS:   No current facility-administered medications on file prior to encounter.     Current Outpatient Medications on File Prior to Encounter   Medication Sig Dispense Refill    ALBUTEROL INHL Inhale 2 puffs into the lungs as needed.      losartan (COZAAR) 25 MG tablet Take 25 mg by mouth once daily.      metFORMIN (GLUCOPHAGE) 1000 MG tablet Take 1,000 mg by mouth 2 (two) times daily with meals.         OB History    No obstetric history on file.         Social History     Socioeconomic History    Marital status:    Tobacco Use    Smoking status: Never     Passive exposure: Never    Smokeless tobacco: Never   Substance and Sexual Activity    Alcohol use: Never    Drug use: Never    Sexual activity: Yes     Partners: Male       History reviewed. No pertinent family history.    Vitals:  Vitals:    04/29/23 0715 04/29/23 0730 04/29/23 0745 04/29/23 0828   BP: (!) 153/73 (!) 154/71  Comment: Simultaneous filing. User may not have seen previous data. (!) 157/72 (!) 155/70   BP Location:   Right arm    Patient Position:   Lying    Pulse: 105 105  Comment: Simultaneous filing. User may not have seen  previous data. 105    Resp: (!) 24 (!) 24  Comment: Simultaneous filing. User may not have seen previous data. (!) 25 (!) 25   Temp:   99.5 °F (37.5 °C)    TempSrc:   Axillary    SpO2: 99% 100%  Comment: Simultaneous filing. User may not have seen previous data. 100%    Weight:       Height:             Physical Exam:   General: NAD, WNWD  CV: RRR, S1 and S2 present  Pulm: CTAB, synchronous with the vent, AC/VC, PEEP 5, RR 25, Vt 380  ABD: Soft, ND, TTP RUQ/RLQ/suprapubic, no apparent guarding  Ext: No edema  Neuro: Intubated and sedated, RASS 0, tolerating the vent, opens eyes spontaneously, withdraws to pain    Assessment:  Chelsi Johnson is a 43 y.o. female with PMH T2DM, HTN who was admitted for tubo-ovarian abscess. Went for IR drainage of TOA on 4/28/23 complicated by bleed of R iliac circumflex artery, successfully embolized. TOA unable to be accessed. Pt then required transfer to ICU intubated for continued resuscitation and management after arterial bleed. Received 2U pRBCs with resolution of acute blood loss anemia.     Plan:    #TOA  - Daily CBC  - GCCT pending  - Continue rocephin/doxy/flagyl. Elevated WBC and isolated fever likely 2/2 acute blood loss with blood transfusion. Will keep antibiotics as is for today and continue to monitor.   - IR following, will defer any further intervention at this time given complication with initial procedure.   - If TOA persistent despite antibiotics, will plan for salpingectomy and possible oophorectomy. However needs more time on antibiotics     #Pelvic arterial bleed  - Complication of attempt at TOA drainage 4/29/23. Successfully embolized. S/p 2U pRBCs 4/29.   - Bleed appears resolved, Hgb stable this AM. No longer need to trend H/H  - Will plan for SBT and possible extubation today    #T2DM  - Hold home metformin while inpatient   - Hgb A1C 10%, will need follow-up with PCP for further management outpatient  - Daily CMP  - MDSSI with goal glucose 140-180  - Will  increase lantus to 8U qHS given glucose still in 200's   - Give additional 5U lantus now    #HTN  - Losartan initially held due to hypotension from acute blood loss however this has resolved. Not back to baseline HTN  - Continue home losartan 25mg    Ulcer PPX: pepcid while intubated  DVT PPX: SCDs only, will consider addition of lovenox pending stable Hgb after acute bleed  Diet: NPO pending possible extubation today  Dispo: Home pending clinical improvement    Jaleel Johnson DO  [facility] [facility], PGY-3

## 2023-04-29 NOTE — CONSULTS
Pulm/CC Fellow Consult Note    Attending Physician: Cirilo Sampson MD    Date of Admit: 4/27/2023    Reason for Consult: hemorrhage    History of Present Illness:  Chelsi Johnson is a 43 y.o.  female with a PMHx HTN and T2DM who was admitted for tubo-ovarian abscess. Her initial presenting symptoms were RLQ cramping, subjective fevers, nausea and emesis. She was hemodynamically stable with normal vitals on presentation and was notably hypertensive. CT a/p on presentation showed multifibroid uterus, dilated cystic structure with peripheral enhancement in RLQ that was 2.4cm in diameter. An MRI was done and showed an inflamed fluid-filled tubular structure in the RLQ/R adnexa and a smaller distended tubular structure in the L adnexa.     She was taken to IR procedure for planned R drain placement. She had immediate bleeding from a small intra-abdominal hematoma before drain could be placed which was rapidly enlargening. A CTA was done which showed active arterial bleeding in the RLQ and a stat embolization was done of the R circumflex iliac artery with no extravasation done thereafter. Of note, her Hgb was 11.2g/dL prior to procedure and was noted to be 3.9g/dL post procedure, but she arrived to the ICU without vasopressor support, though hypotensive compared to baseline. She was intubated for the procedure and remains intubated.       Past Medical History:  Past Medical History:   Diagnosis Date    Diabetes mellitus     Hypertension        Past Surgical History:  Past Surgical History:   Procedure Laterality Date    BREAST LUMPECTOMY Right     TUBAL LIGATION         Allergies:  Review of patient's allergies indicates:  No Known Allergies    Family History:  History reviewed. No pertinent family history.    Social History:  Social History     Tobacco Use    Smoking status: Never     Passive exposure: Never    Smokeless tobacco: Never   Substance Use Topics    Alcohol use: Never    Drug use: Never       Review of  "Systems:  Review of Systems   Unable to perform ROS: Intubated      Objective:   Last 24 Hour Vital Signs:  BP  Min: 135/74  Max: 174/98  Temp  Av.7 °F (37.1 °C)  Min: 98 °F (36.7 °C)  Max: 99.7 °F (37.6 °C)  Pulse  Av  Min: 83  Max: 97  Resp  Av  Min: 16  Max: 43  SpO2  Av.1 %  Min: 92 %  Max: 100 %  Height  Av' 4" (162.6 cm)  Min: 5' 4" (162.6 cm)  Max: 5' 4" (162.6 cm)  Weight  Av.9 kg (229 lb 1.5 oz)  Min: 103.4 kg (228 lb)  Max: 104.3 kg (230 lb)  Body mass index is 39.36 kg/m².  I/O last 3 completed shifts:  In: 3015.6 [I.V.:1453.1; Blood:330; IV Piggyback:1232.6]  Out: -     Physical Exam:  General: Intubated and sedated  HENT:  NCAT; anicteric sclera; OP clear with MMM  Cardio:  Regular rate and rhythm with normal S1 and S2; no murmurs or rubs  Resp:  CTAB; respirations unlabored; no wheezes, crackles or rhonchi  Abdom:  Soft, Non-tender, no guarding or rebound  Extrem:  WWP with no clubbing, cyanosis or edema  Pulses:  2+ and symmetric distally  Neuro:  Sedated     Assessment & Plan:   43 y.o.  female with a PMHx HTN and T2DM who was admitted for tubo-ovarian abscess c/b pelvic arterial bleeding during procedure to drain abscess, admitted to the ICU for resuscitation.     Neuro  - intubated and sedated  - will plan for SAT/SBT tomorrow AM    CVS  - hx of HTN outpatient on losartan 25mg QD -- hold given hemorrhage     Pulm  - intubated for airway protection/procedure on minimal vent settings   - CXR and VBG post intubation    GI  - hepatosteatosis seen on MRI - will need outpt f/u  - elevated INR - repeat in AM     Renal  - no acute issues    Heme  - stat repeat CBC - it is difficult to imagine the patient losing ~7g/dL of hemoglobin without evidence of shock  - planned to receive 4u PRBCs for acute anemia -- if patient requires further transfusion past 4 units, will need a unit of platelets and FFP     ID  - drain not able to be placed for abscess  - blood culture x2 ordered  - " c trachomatis/n gonorrhoeae pendintg    Endocrine  - T2DM - on outpatient metformin, goal -180 while in ICU  - on 5U detemir daily w/ sliding scale       Ijeoma Santillan  Fellow  Pulmonary & CCM

## 2023-04-29 NOTE — ANESTHESIA POSTPROCEDURE EVALUATION
Anesthesia Post Evaluation    Patient: Chelsi Johnson    Procedure(s) Performed: * No procedures listed *    Final Anesthesia Type: general      Patient location during evaluation: ICU  Patient participation: No - Unable to Participate, Intubation  Level of consciousness: sedated  Post-procedure vital signs: reviewed and stable  Pain management: adequate  Airway patency: patent    PONV status at discharge: No PONV  Anesthetic complications: no      Cardiovascular status: blood pressure returned to baseline and hemodynamically stable  Respiratory status: intubated and ventilator  Hydration status: euvolemic  Follow-up not needed.          Vitals Value Taken Time   /71 04/28/23 1725   Temp 36.7 °C (98 °F) 04/28/23 1202   Pulse 86 04/28/23 1944   Resp 24 04/28/23 1944   SpO2 95 % 04/28/23 1944   Vitals shown include unvalidated device data.      No case tracking events are documented in the log.      Pain/Billy Score: Pain Rating Prior to Med Admin: 3 (4/28/2023  3:06 PM)  Pain Rating Post Med Admin: 0 (4/28/2023  3:55 AM)

## 2023-04-29 NOTE — EICU
Intervention Initiated From:  Bedside    Rodrigo intervened regarding:  Other    Nurse Notified:  Yes    Doctor Notified:  Yes    Comments: Elert for patient arrival from interventional radiology. Video assessment done. EICU MD alerted to patient arrival

## 2023-04-30 LAB
ALBUMIN SERPL BCP-MCNC: 2.6 G/DL (ref 3.5–5.2)
ALP SERPL-CCNC: 68 U/L (ref 55–135)
ALT SERPL W/O P-5'-P-CCNC: 12 U/L (ref 10–44)
ANION GAP SERPL CALC-SCNC: 10 MMOL/L (ref 8–16)
ANION GAP SERPL CALC-SCNC: 11 MMOL/L (ref 8–16)
AST SERPL-CCNC: 15 U/L (ref 10–40)
BILIRUB SERPL-MCNC: 0.9 MG/DL (ref 0.1–1)
BUN SERPL-MCNC: 7 MG/DL (ref 6–20)
BUN SERPL-MCNC: 8 MG/DL (ref 6–20)
CALCIUM SERPL-MCNC: 9.2 MG/DL (ref 8.7–10.5)
CALCIUM SERPL-MCNC: 9.2 MG/DL (ref 8.7–10.5)
CHLORIDE SERPL-SCNC: 103 MMOL/L (ref 95–110)
CHLORIDE SERPL-SCNC: 104 MMOL/L (ref 95–110)
CO2 SERPL-SCNC: 20 MMOL/L (ref 23–29)
CO2 SERPL-SCNC: 21 MMOL/L (ref 23–29)
CREAT SERPL-MCNC: 0.7 MG/DL (ref 0.5–1.4)
CREAT SERPL-MCNC: 0.8 MG/DL (ref 0.5–1.4)
ERYTHROCYTE [DISTWIDTH] IN BLOOD BY AUTOMATED COUNT: 13.2 % (ref 11.5–14.5)
EST. GFR  (NO RACE VARIABLE): >60 ML/MIN/1.73 M^2
EST. GFR  (NO RACE VARIABLE): >60 ML/MIN/1.73 M^2
GLUCOSE SERPL-MCNC: 233 MG/DL (ref 70–110)
GLUCOSE SERPL-MCNC: 269 MG/DL (ref 70–110)
HCT VFR BLD AUTO: 34.9 % (ref 37–48.5)
HCT VFR BLD AUTO: 35.9 % (ref 37–48.5)
HGB BLD-MCNC: 11 G/DL (ref 12–16)
HGB BLD-MCNC: 11.5 G/DL (ref 12–16)
MCH RBC QN AUTO: 28.4 PG (ref 27–31)
MCHC RBC AUTO-ENTMCNC: 31.5 G/DL (ref 32–36)
MCV RBC AUTO: 90 FL (ref 82–98)
PLATELET # BLD AUTO: 332 K/UL (ref 150–450)
PMV BLD AUTO: 10.6 FL (ref 9.2–12.9)
POCT GLUCOSE: 238 MG/DL (ref 70–110)
POCT GLUCOSE: 255 MG/DL (ref 70–110)
POCT GLUCOSE: 262 MG/DL (ref 70–110)
POCT GLUCOSE: 286 MG/DL (ref 70–110)
POTASSIUM SERPL-SCNC: 3.3 MMOL/L (ref 3.5–5.1)
POTASSIUM SERPL-SCNC: 3.4 MMOL/L (ref 3.5–5.1)
PROT SERPL-MCNC: 7.3 G/DL (ref 6–8.4)
RBC # BLD AUTO: 3.87 M/UL (ref 4–5.4)
SODIUM SERPL-SCNC: 134 MMOL/L (ref 136–145)
SODIUM SERPL-SCNC: 135 MMOL/L (ref 136–145)
WBC # BLD AUTO: 13.22 K/UL (ref 3.9–12.7)

## 2023-04-30 PROCEDURE — 85018 HEMOGLOBIN: CPT | Performed by: STUDENT IN AN ORGANIZED HEALTH CARE EDUCATION/TRAINING PROGRAM

## 2023-04-30 PROCEDURE — 25000003 PHARM REV CODE 250: Performed by: STUDENT IN AN ORGANIZED HEALTH CARE EDUCATION/TRAINING PROGRAM

## 2023-04-30 PROCEDURE — 94761 N-INVAS EAR/PLS OXIMETRY MLT: CPT

## 2023-04-30 PROCEDURE — 97530 THERAPEUTIC ACTIVITIES: CPT

## 2023-04-30 PROCEDURE — 36415 COLL VENOUS BLD VENIPUNCTURE: CPT | Performed by: STUDENT IN AN ORGANIZED HEALTH CARE EDUCATION/TRAINING PROGRAM

## 2023-04-30 PROCEDURE — 97161 PT EVAL LOW COMPLEX 20 MIN: CPT

## 2023-04-30 PROCEDURE — 25000003 PHARM REV CODE 250: Performed by: ANESTHESIOLOGY

## 2023-04-30 PROCEDURE — 99900035 HC TECH TIME PER 15 MIN (STAT)

## 2023-04-30 PROCEDURE — 63600175 PHARM REV CODE 636 W HCPCS: Performed by: STUDENT IN AN ORGANIZED HEALTH CARE EDUCATION/TRAINING PROGRAM

## 2023-04-30 PROCEDURE — 85014 HEMATOCRIT: CPT | Performed by: STUDENT IN AN ORGANIZED HEALTH CARE EDUCATION/TRAINING PROGRAM

## 2023-04-30 PROCEDURE — 80053 COMPREHEN METABOLIC PANEL: CPT | Performed by: STUDENT IN AN ORGANIZED HEALTH CARE EDUCATION/TRAINING PROGRAM

## 2023-04-30 PROCEDURE — 63600175 PHARM REV CODE 636 W HCPCS

## 2023-04-30 PROCEDURE — S0030 INJECTION, METRONIDAZOLE: HCPCS | Performed by: OBSTETRICS & GYNECOLOGY

## 2023-04-30 PROCEDURE — 25000003 PHARM REV CODE 250: Performed by: OBSTETRICS & GYNECOLOGY

## 2023-04-30 PROCEDURE — 11000001 HC ACUTE MED/SURG PRIVATE ROOM

## 2023-04-30 PROCEDURE — 25000003 PHARM REV CODE 250

## 2023-04-30 PROCEDURE — 80048 BASIC METABOLIC PNL TOTAL CA: CPT | Mod: XB | Performed by: STUDENT IN AN ORGANIZED HEALTH CARE EDUCATION/TRAINING PROGRAM

## 2023-04-30 PROCEDURE — 85027 COMPLETE CBC AUTOMATED: CPT | Performed by: STUDENT IN AN ORGANIZED HEALTH CARE EDUCATION/TRAINING PROGRAM

## 2023-04-30 RX ORDER — AMLODIPINE BESYLATE 5 MG/1
5 TABLET ORAL DAILY
Status: DISCONTINUED | OUTPATIENT
Start: 2023-04-30 | End: 2023-05-03 | Stop reason: HOSPADM

## 2023-04-30 RX ORDER — SODIUM CHLORIDE AND POTASSIUM CHLORIDE 300; 900 MG/100ML; MG/100ML
INJECTION, SOLUTION INTRAVENOUS CONTINUOUS
Status: DISPENSED | OUTPATIENT
Start: 2023-04-30 | End: 2023-05-01

## 2023-04-30 RX ORDER — MORPHINE SULFATE 2 MG/ML
2 INJECTION, SOLUTION INTRAMUSCULAR; INTRAVENOUS
Status: DISCONTINUED | OUTPATIENT
Start: 2023-04-30 | End: 2023-04-30

## 2023-04-30 RX ORDER — POTASSIUM CHLORIDE 20 MEQ/1
40 TABLET, EXTENDED RELEASE ORAL ONCE
Status: DISCONTINUED | OUTPATIENT
Start: 2023-04-30 | End: 2023-04-30

## 2023-04-30 RX ORDER — MORPHINE SULFATE 2 MG/ML
INJECTION, SOLUTION INTRAMUSCULAR; INTRAVENOUS
Status: COMPLETED
Start: 2023-04-30 | End: 2023-04-30

## 2023-04-30 RX ORDER — METOCLOPRAMIDE 10 MG/1
10 TABLET ORAL 3 TIMES DAILY
Status: DISCONTINUED | OUTPATIENT
Start: 2023-04-30 | End: 2023-05-03 | Stop reason: HOSPADM

## 2023-04-30 RX ORDER — SIMETHICONE 125 MG
125 TABLET,CHEWABLE ORAL EVERY 6 HOURS
Status: DISCONTINUED | OUTPATIENT
Start: 2023-04-30 | End: 2023-05-03 | Stop reason: HOSPADM

## 2023-04-30 RX ORDER — IBUPROFEN 600 MG/1
600 TABLET ORAL EVERY 6 HOURS
Status: DISCONTINUED | OUTPATIENT
Start: 2023-05-01 | End: 2023-05-01

## 2023-04-30 RX ORDER — OXYCODONE AND ACETAMINOPHEN 10; 325 MG/1; MG/1
1 TABLET ORAL EVERY 4 HOURS PRN
Status: DISCONTINUED | OUTPATIENT
Start: 2023-04-30 | End: 2023-05-03 | Stop reason: HOSPADM

## 2023-04-30 RX ORDER — MORPHINE SULFATE 2 MG/ML
2 INJECTION, SOLUTION INTRAMUSCULAR; INTRAVENOUS
Status: DISCONTINUED | OUTPATIENT
Start: 2023-04-30 | End: 2023-05-03 | Stop reason: HOSPADM

## 2023-04-30 RX ORDER — HYDROCHLOROTHIAZIDE 12.5 MG/1
12.5 TABLET ORAL DAILY
Status: DISCONTINUED | OUTPATIENT
Start: 2023-04-30 | End: 2023-04-30

## 2023-04-30 RX ORDER — FAMOTIDINE 20 MG/1
20 TABLET, FILM COATED ORAL DAILY
Status: DISCONTINUED | OUTPATIENT
Start: 2023-04-30 | End: 2023-05-03 | Stop reason: HOSPADM

## 2023-04-30 RX ADMIN — OXYCODONE HYDROCHLORIDE AND ACETAMINOPHEN 1 TABLET: 5; 325 TABLET ORAL at 10:04

## 2023-04-30 RX ADMIN — METRONIDAZOLE 500 MG: 5 INJECTION, SOLUTION INTRAVENOUS at 04:04

## 2023-04-30 RX ADMIN — METOCLOPRAMIDE 10 MG: 10 TABLET ORAL at 03:04

## 2023-04-30 RX ADMIN — OXYCODONE HYDROCHLORIDE AND ACETAMINOPHEN 1 TABLET: 5; 325 TABLET ORAL at 08:04

## 2023-04-30 RX ADMIN — METRONIDAZOLE 500 MG: 5 INJECTION, SOLUTION INTRAVENOUS at 10:04

## 2023-04-30 RX ADMIN — LOSARTAN POTASSIUM 50 MG: 50 TABLET, FILM COATED ORAL at 09:04

## 2023-04-30 RX ADMIN — INSULIN ASPART 6 UNITS: 100 INJECTION, SOLUTION INTRAVENOUS; SUBCUTANEOUS at 04:04

## 2023-04-30 RX ADMIN — MORPHINE SULFATE 2 MG: 2 INJECTION, SOLUTION INTRAMUSCULAR; INTRAVENOUS at 08:04

## 2023-04-30 RX ADMIN — INSULIN ASPART 3 UNITS: 100 INJECTION, SOLUTION INTRAVENOUS; SUBCUTANEOUS at 09:04

## 2023-04-30 RX ADMIN — SODIUM CHLORIDE, SODIUM LACTATE, POTASSIUM CHLORIDE, AND CALCIUM CHLORIDE 1000 ML: .6; .31; .03; .02 INJECTION, SOLUTION INTRAVENOUS at 08:04

## 2023-04-30 RX ADMIN — OXYCODONE HYDROCHLORIDE AND ACETAMINOPHEN 1 TABLET: 5; 325 TABLET ORAL at 05:04

## 2023-04-30 RX ADMIN — FAMOTIDINE 20 MG: 20 TABLET, FILM COATED ORAL at 10:04

## 2023-04-30 RX ADMIN — DOXYCYCLINE 100 MG: 100 INJECTION, POWDER, LYOPHILIZED, FOR SOLUTION INTRAVENOUS at 09:04

## 2023-04-30 RX ADMIN — DOXYCYCLINE 100 MG: 100 INJECTION, POWDER, LYOPHILIZED, FOR SOLUTION INTRAVENOUS at 08:04

## 2023-04-30 RX ADMIN — METOCLOPRAMIDE 10 MG: 10 TABLET ORAL at 10:04

## 2023-04-30 RX ADMIN — INSULIN ASPART 6 UNITS: 100 INJECTION, SOLUTION INTRAVENOUS; SUBCUTANEOUS at 12:04

## 2023-04-30 RX ADMIN — CHLORHEXIDINE GLUCONATE 0.12% ORAL RINSE 15 ML: 1.2 LIQUID ORAL at 09:04

## 2023-04-30 RX ADMIN — CEFTRIAXONE 1 G: 1 INJECTION, POWDER, FOR SOLUTION INTRAMUSCULAR; INTRAVENOUS at 03:04

## 2023-04-30 RX ADMIN — MUPIROCIN: 20 OINTMENT TOPICAL at 09:04

## 2023-04-30 RX ADMIN — MORPHINE SULFATE 2 MG: 2 INJECTION, SOLUTION INTRAMUSCULAR; INTRAVENOUS at 04:04

## 2023-04-30 RX ADMIN — ACETAMINOPHEN 650 MG: 325 TABLET ORAL at 08:04

## 2023-04-30 RX ADMIN — POTASSIUM BICARBONATE 25 MEQ: 978 TABLET, EFFERVESCENT ORAL at 03:04

## 2023-04-30 RX ADMIN — SIMETHICONE 125 MG: 125 TABLET, CHEWABLE ORAL at 11:04

## 2023-04-30 RX ADMIN — AMLODIPINE BESYLATE 5 MG: 5 TABLET ORAL at 06:04

## 2023-04-30 RX ADMIN — SIMETHICONE 125 MG: 125 TABLET, CHEWABLE ORAL at 05:04

## 2023-04-30 RX ADMIN — SIMETHICONE 125 MG: 125 TABLET, CHEWABLE ORAL at 06:04

## 2023-04-30 RX ADMIN — INSULIN ASPART 4 UNITS: 100 INJECTION, SOLUTION INTRAVENOUS; SUBCUTANEOUS at 06:04

## 2023-04-30 RX ADMIN — METRONIDAZOLE 500 MG: 5 INJECTION, SOLUTION INTRAVENOUS at 03:04

## 2023-04-30 NOTE — NURSING
"RAPID RESPONSE NURSE PROACTIVE ROUNDING NOTE     Time of Visit: 530    Admit Date: 2023  LOS: 2  Code Status: Full Code   Date of Visit: 2023  : 1980  Age: 43 y.o.  Sex: female  Race: Black or   Bed: K534/K534 A:   MRN: [id number]  Was the patient discharged from an ICU this admission? Yes   Was the patient discharged from a PACU within last 24 hours?  No  Did the patient receive conscious sedation/general anesthesia in last 24 hours?  No  Was the patient in the ED within the past 24 hours?  No  Was the patient started on NIPPV within the past 24 hours?  No  Attending Physician: Ciriol Sampson MD  Primary Service: Networked reference to record PCT     ASSESSMENT     Notified by bedside RN via phone call.  Reason for alert: PIV placement    Diagnosis: Tubo-ovarian abscess    Abnormal Vital Signs: BP (!) 166/83 (BP Location: Right arm, Patient Position: Lying)   Pulse 101   Temp (!) 100.6 °F (38.1 °C)   Resp 18   Ht 5' 4" (1.626 m)   Wt 104 kg (229 lb 4.5 oz)   LMP 2023   SpO2 97%   Breastfeeding No   BMI 39.36 kg/m²      Clinical Issues:  Abscess    Patient  has a past medical history of Diabetes mellitus and Hypertension.      Contacted for PIV placement. 20g PIV placed to RAJNI Ibrahim RN at bedside. LDA placed.      INTERVENTIONS/ RECOMMENDATIONS     Discussed plan of care with Alexandria VILLATORO.    FOLLOW-UP     Call back the Rapid Response Nurse, GINGER LUNDBERG RN at [facility] Phone: [phone] for additional questions or concerns.         "

## 2023-04-30 NOTE — CONSULTS
Consult note  [facility]    Consulted by: Cirilo Sampson MD  Reason for Consult: T2DM and HTN    History of Present Illness: Patient is a 43 y.o. female with a PMHx T2DM, HTN here for bilateral tubo-ovarian abscesses. IR attempted drainage of the right tubo-ovarian ovarian abscess but was complicated by extravasation of the right circumflex artery resulting in formation of large hematoma which required emergent embolization. Patient extubated on 04/28/2023 and remains admitted within the ICU for closer monitoring. Patient is receiving IV antibiotics for her TOA including ceftriaxone, doxycycline, and metronidazole. [facility] has been consulted for management of patient's type 2 diabetes and hypertension.    Patient endorses she is currently taking losartan 25 mg daily and metformin 1000 mg daily at home. Hemoglobin A1c on admission was 10% and patient has maintained BG levels in the upper 200s requiring detemir and SSI. Patient currently on detemir 15 units nightly plus SSI.    Interval Hx:  Pt was stepped down to floor from ICU overnight. Endorses RLQ pain. Poor PO intake. 1L LR bolus given this AM. Pt not c/o of N/V.      PTA Medications   Medication Sig    ALBUTEROL INHL Inhale 2 puffs into the lungs as needed.    losartan (COZAAR) 25 MG tablet Take 25 mg by mouth once daily.    metFORMIN (GLUCOPHAGE) 1000 MG tablet Take 1,000 mg by mouth 2 (two) times daily with meals.       Review of patient's allergies indicates:  No Known Allergies    Past Medical History:   Diagnosis Date    Diabetes mellitus     Hypertension      Past Surgical History:   Procedure Laterality Date    BREAST LUMPECTOMY Right     TUBAL LIGATION       History reviewed. No pertinent family history.  Social History     Tobacco Use    Smoking status: Never     Passive exposure: Never    Smokeless tobacco: Never   Substance Use Topics    Alcohol use: Never    Drug use: Never       Review of Systems:   Review of Systems    Constitutional:  Negative for fever.   Respiratory:  Negative for shortness of breath.    Cardiovascular:  Negative for chest pain.   Gastrointestinal:  Positive for abdominal pain. Negative for nausea and vomiting.   Musculoskeletal:  Negative for back pain.   Neurological:  Negative for headaches.      OBJECTIVE:     Vital Signs (Most Recent)  Temp: 98.3 °F (36.8 °C) (04/30/23 1143)  Pulse: 102 (04/30/23 1153)  Resp: 17 (04/30/23 1143)  BP: (!) 143/82 (04/30/23 1143)  SpO2: 96 % (04/30/23 1143)    Physical Exam:  Gen- WNWD, NAD  CV- tachycardic with normal sinus rhythm, no LE edema  Resp- breathing comfortably on RA, CTAB  Abd- diffuse tenderness but maximal at RLQ, soft, distended abdomen, BS normal, no rebound tenderness or guarding  Psych- logical thought process, answers questions appropriately     Laboratory  Lab Results   Component Value Date    WBC 13.22 (H) 04/30/2023    HGB 11.0 (L) 04/30/2023    HCT 34.9 (L) 04/30/2023    MCV 90 04/30/2023     04/30/2023      CMP  Sodium   Date Value Ref Range Status   04/30/2023 135 (L) 136 - 145 mmol/L Final     Potassium   Date Value Ref Range Status   04/30/2023 3.3 (L) 3.5 - 5.1 mmol/L Final     Chloride   Date Value Ref Range Status   04/30/2023 104 95 - 110 mmol/L Final     CO2   Date Value Ref Range Status   04/30/2023 20 (L) 23 - 29 mmol/L Final     Glucose   Date Value Ref Range Status   04/30/2023 233 (H) 70 - 110 mg/dL Final     BUN   Date Value Ref Range Status   04/30/2023 8 6 - 20 mg/dL Final     Creatinine   Date Value Ref Range Status   04/30/2023 0.8 0.5 - 1.4 mg/dL Final     Calcium   Date Value Ref Range Status   04/30/2023 9.2 8.7 - 10.5 mg/dL Final     Total Protein   Date Value Ref Range Status   04/30/2023 7.3 6.0 - 8.4 g/dL Final     Albumin   Date Value Ref Range Status   04/30/2023 2.6 (L) 3.5 - 5.2 g/dL Final     Total Bilirubin   Date Value Ref Range Status   04/30/2023 0.9 0.1 - 1.0 mg/dL Final     Comment:     For infants and  newborns, interpretation of results should be based  on gestational age, weight and in agreement with clinical  observations.    Premature Infant recommended reference ranges:  Up to 24 hours.............<8.0 mg/dL  Up to 48 hours............<12.0 mg/dL  3-5 days..................<15.0 mg/dL  6-29 days.................<15.0 mg/dL       Alkaline Phosphatase   Date Value Ref Range Status   04/30/2023 68 55 - 135 U/L Final     AST   Date Value Ref Range Status   04/30/2023 15 10 - 40 U/L Final     ALT   Date Value Ref Range Status   04/30/2023 12 10 - 44 U/L Final     Anion Gap   Date Value Ref Range Status   04/30/2023 11 8 - 16 mmol/L Final        Lab Results   Component Value Date    INR 1.1 04/29/2023    INR 1.6 (H) 04/28/2023      No results for input(s): CPK, CPKMB, TROPONINI, MB in the last 168 hours.   No results for input(s): TROPONINI, CKTOTAL, CKMB in the last 168 hours.    BNP  No results for input(s): BNP in the last 168 hours.    Urinalysis  No results for input(s): COLORU, CLARITYU, SPECGRAV, PHUR, PROTEINUA, GLUCOSEU, BILIRUBINCON, BLOODU, WBCU, RBCU, BACTERIA, MUCUS, NITRITE, LEUKOCYTESUR, UROBILINOGEN, HYALINECASTS in the last 24 hours.   LAST HbA1c  Lab Results   Component Value Date    HGBA1C 10.0 (H) 04/28/2023       Diagnostic Results:  Labs: Reviewed  X-Ray: Reviewed    Imaging Results              MRI Abdomen-Pelvis w w/o Contrast (XPD) (Final result)  Result time 04/28/23 08:27:21      Final result by Chencho Joseph DO (04/28/23 08:27:21)                   Impression:      1. Inflamed, distended, fluid-filled tubular structure in the right adnexa/right lower quadrant, favored to represent a distended fallopian tube with hydrosalpinx/pyosalpinx.  Acute appendicitis is felt less likely.  2. Smaller distended tubular structure in the left adnexa, also concerning for hydrosalpinx/pyosalpinx.  3. Large fibroid uterus.  4. Hepatomegaly and hepatic steatosis.      Electronically signed by: Chencho  Opal  Date:    04/28/2023  Time:    08:27               Narrative:    EXAMINATION:  MRI ABDOMEN-PELVIS W W/O CONTRAST (XPD)    CLINICAL HISTORY:  RLQ abdominal pain (Age >= 14y);    TECHNIQUE:  Multisequence, multiplanar MRI of the abdomen performed per liver protocol before and after administration of 10 mL Gadavist intravenous contrast.    COMPARISON:  CT of the abdomen and pelvis from 04/27/2023.    FINDINGS:  Lung bases: The lung bases are grossly clear.    Liver: The liver is enlarged and demonstrates diffuse loss of signal on out of phase imaging compatible with hepatic steatosis.  There are no focal hepatic lesions.    Biliary: No intrahepatic or extrahepatic biliary dilatation.    Gallbladder: The gallbladder is unremarkable without evidence of cholelithiasis, gallbladder wall thickening, or pericholecystic fluid.    Pancreas: Unremarkable.  No pancreatic ductal dilatation.    Spleen: Normal size.  No focal lesions.    Adrenal glands: Unremarkable.    Kidneys: No renal masses.  No hydronephrosis.    GI: Visualized bowel loops are unremarkable.    Pelvis: There is an inflamed dilated tubular structure in the right adnexa/right lower quadrant measuring up to approximately 2.3 cm in maximal diameter, with adjacent surrounding edema and fat stranding.  The tubular structure does not definitively arise from the cecum and is favored to represent a distended fallopian tube with hydrosalpinx/pyosalpinx.  The appendix is not definitively seen separate from this structure.  There is an additional smaller tubular structure in the left adnexa with a diameter of approximately 1.1 cm, without significant surrounding edema.    Reproductive: There are multiple large uterine fibroids.  There is trace free fluid in the pelvis.    Mesentery: No ascites. No focal fluid collection. No free air.    Body wall: Unremarkable.    Musculoskeletal: Marrow signal is unremarkable.                                       CT Abdomen Pelvis  With Contrast (Final result)  Result time 04/27/23 20:39:21      Final result by Lobo Tobias MD (04/27/23 20:39:21)                   Impression:      Enlarged multi fibroid uterus.  Recommend clinical correlation for fibroid degeneration.  Recommend follow-up MRI with contrast for further evaluation on a nonemergent basis    Fatty infiltration liver with hepatomegaly    There is a dilated cystic structure with peripheral enhancement in the right lower quadrant measuring up to 24 mm in diameter may relate to an inflamed appendix.  Differential diagnosis includes dilated right-sided fallopian tubes which is not well delineated.  Additional consideration includes oral contrast administration and repeat CT.    All CT scans at this facility use dose modulation, iterative reconstruction, and/or weight based dosing when appropriate to reduce radiation dose to as low as reasonably achievable.      Electronically signed by: Lobo Tobias  Date:    04/27/2023  Time:    20:39               Narrative:    EXAMINATION:  CT ABDOMEN PELVIS WITH CONTRAST    CLINICAL HISTORY:  RLQ abdominal pain (Age >= 14y);    TECHNIQUE:  Low dose axial images, sagittal and coronal reformations were obtained from the lung bases to the pubic symphysis following the IV administration of 100 mL of Omnipaque 350.    COMPARISON:  None    FINDINGS:  Heart: Normal size as far as seen. No effusion as far as seen.    Lung Bases: Clear.    Liver: Fatty infiltration of liver with hepatomegaly..  No focal lesions.    Gallbladder: No calcified gallstones.    Bile Ducts: No dilatation.    Pancreas: No mass. No peripancreatic fat stranding.    Spleen: Normal.    Adrenals: Normal.    Kidneys/Ureters: Normal enhancement.  No mass or  hydroureteronephrosis.    Bladder: No wall thickening.    Reproductive organs: Heterogeneous clean hand sing enlarged multi fibroid uterus.    GI Tract/Mesentery: No evidence of bowel obstruction or inflammation.    There is a  dilated cystic structure with peripheral enhancement in the right lower quadrant measuring up to 24 mm in diameter may relate to an inflamed appendix. Differential diagnosis includes dilated right-sided fallopian tubes which is not well delineated. Additional consideration includes oral contrast administration and repeat CT.    Peritoneal Space: No ascites or free air.    Retroperitoneum: No significant adenopathy.    Abdominal wall: Normal.    Vasculature: No aneurysm.    Bones: No acute fracture. No suspicious lytic or sclerotic lesions.                                      ASSESSMENT/PLAN:   43 y.o.female has a past medical history of Diabetes mellitus and Hypertension. here for tubo-ovarian abscess complicated by extravasation of the right circumflex artery resulting in formation of large hematoma requiring emergent embolization. S/p extubation on 04/28/2023. Patient currently hemodynamically stable on the floor.    #TOA  # Pelvic arterial bleed  - will plan to continue Rocephin, doxycycline, and Flagyl.  Per gyn, if TOA persists despite antibiotics then will consider plan for salpingectomy and possible oophorectomy.   - CBC: WBC 13 (15), H&H 11/34.9 (12/37.2)  - AXR on 4/29/23 showed multiple several scattered prominent air-filled small bowel loops throughout the abdomen and pelvis. Was followed up with serial abdominal exam.  - IV Zofran and IV prochlorperazine PRN for N/V  - Tylenol, IV morphine, Percocet PRN for pain    #Essential Hypertension   - Home medications include losartan 25 mg daily.  Within the past 24 hours, patient with improved BP reading showing systolics in the 130-140s and diastolics in the 60-80s.   - Continue losartan 50 mg daily  - Can use IV hydralazine or labetolol PRN for SYS > 180  - Can consider additional hypertensive agents if BP uncontrolled     #T2DM  - HbA1c 10% on admission  - Currently on insulin detemir 15 units nightly + SSI  -  this AM  - Continue to titrate as needed  to a blood glucose goal of 140-180 while inpatient     Family medicine will continue to follow. Please contact us if you have any further questions. Thank you for the consult.       Yoana Ribeiro MD, MPH  \[facility]\"" Family Medicine PGY-1  04/30/2023

## 2023-04-30 NOTE — PT/OT/SLP EVAL
Physical Therapy Evaluation    Patient Name:  Chelsi Johnson   MRN:  [id number]    Recommendations:     Discharge Recommendations: home health PT   Discharge Equipment Recommendations: walker, rolling   Barriers to discharge: None    Assessment:     Chelsi Johnson is a 43 y.o. female admitted with a medical diagnosis of Tubo-ovarian abscess.  She presents with the following impairments/functional limitations: weakness, impaired endurance, impaired functional mobility, gait instability, impaired balance, decreased lower extremity function, pain, decreased ROM. Patient was able to ambulate to the chair from the bed today with good control. She moves slowly and with a flexed posture due to abdominal and right anterior hip pain. She moved well with a walker and would likely benefit from one at home until she is better on her feet and is showing more upright posture. Min A for all activity today and cues needed for reaching for chair upon reaching a seated position.     Rehab Prognosis: Good; patient would benefit from acute skilled PT services to address these deficits and reach maximum level of function.    Recent Surgery: * No surgery found *      Plan:     During this hospitalization, patient to be seen 4 x/week to address the identified rehab impairments via gait training, therapeutic activities, therapeutic exercises, neuromuscular re-education and progress toward the following goals:    Plan of Care Expires:  05/30/23    Subjective     Chief Complaint: abdominal pain  Patient/Family Comments/goals: wants her to get moving  Pain/Comfort:  Pain Rating 1: 5/10  Location 1: abdomen  Pain Addressed 1: Nurse notified    Patients cultural, spiritual, Jewish conflicts given the current situation: no    Living Environment:  Lives with family in 1 level home with 4 steps to enter with 2 rails for support  Prior to admission, patients level of function was indep.  Equipment used at home: none.  DME owned (not currently  used): none.  Upon discharge, patient will have assistance from family.    Objective:     Communicated with nsg prior to session.  Patient found supine with peripheral IV, telemetry, PureWick  upon PT entry to room.    General Precautions: Standard, fall  Orthopedic Precautions:N/A   Braces: N/A  Respiratory Status: Room air    Exams:  Gross Motor Coordination:  WFL  Postural Exam:  Patient presented with the following abnormalities: -       Rounded shoulders  -       Abnormal trunk flexion  Skin Integrity/Edema:  -       WFL  RLE ROM: Deficits: limited hip flexion secondary to surgical site  RLE Strength: Deficits: hip flexon and knee extension 4/5  LLE ROM: WFL  LLE Strength: WFL    Patient donned non slip socks and gait belt for OOB mobility    Functional Mobility:  Bed Mobility:  Supine to Sit: minimum assistance  Sit to Supine: minimum assistance  Transfers:  Sit to Stand:  minimum assistance with rolling walker  Gait: ambulated 10 feet in the room to chair with slow gait pattern with mod antalgic compensations and difficulty achieving upright trunk position with walker      AM-PAC 6 CLICK MOBILITY  Total Score:19       Treatment & Education:  Educated given on getting up with staff assist only at this time due to unsteady gait and pain level  Importance of time spent in chair for back support    Patient left up in chair with call button in reach and nsg notified.    GOALS:   Multidisciplinary Problems       Physical Therapy Goals          Problem: Physical Therapy    Goal Priority Disciplines Outcome Goal Variances Interventions   Physical Therapy Goal     PT, PT/OT Ongoing, Progressing     Description: Goals to be met by: 2023     Patient will increase functional independence with mobility by performin. Supine to sit with Modified [place]  2. Sit to supine with Modified [place]  3. Sit to stand transfer with Modified [place]  4. Gait  x 250 feet with Modified [place] using  Rolling Walker.                          History:     Past Medical History:   Diagnosis Date    Diabetes mellitus     Hypertension        Past Surgical History:   Procedure Laterality Date    BREAST LUMPECTOMY Right     TUBAL LIGATION         Time Tracking:     PT Received On: 04/30/23  PT Start Time: 1212     PT Stop Time: 1242  PT Total Time (min): 30 min     Billable Minutes: Evaluation 12 and Therapeutic Activity 18      04/30/2023

## 2023-04-30 NOTE — PLAN OF CARE
Problem: Adult Inpatient Plan of Care  Goal: Plan of Care Review    Outcome: Ongoing, Progressing    Problem: Communication Impairment (Mechanical Ventilation, Invasive)  Goal: Effective Communication  Outcome: Met     Problem: Device-Related Complication Risk (Mechanical Ventilation, Invasive)  Goal: Optimal Device Function  Outcome: Met     Problem: Inability to Wean (Mechanical Ventilation, Invasive)  Goal: Mechanical Ventilation Liberation  Outcome: Met     Problem: Nutrition Impairment (Mechanical Ventilation, Invasive)  Goal: Optimal Nutrition Delivery  Outcome: Met     Problem: Skin and Tissue Injury (Mechanical Ventilation, Invasive)  Goal: Absence of Device-Related Skin and Tissue Injury  Outcome: Met     Problem: Ventilator-Induced Lung Injury (Mechanical Ventilation, Invasive)  Goal: Absence of Ventilator-Induced Lung Injury  Outcome: Met     Problem: Skin Injury Risk Increased  Goal: Skin Health and Integrity  Outcome: Met     Problem: Restraint, Nonbehavioral (Nonviolent)  Goal: Absence of Harm or Injury  Outcome: Met      VIRTUAL NURSE:  Labs, notes, orders, and careplan reviewed.

## 2023-04-30 NOTE — PLAN OF CARE
Problem: Physical Therapy  Goal: Physical Therapy Goal  Description: Goals to be met by: 2023     Patient will increase functional independence with mobility by performin. Supine to sit with Modified [place]  2. Sit to supine with Modified [place]  3. Sit to stand transfer with Modified [place]  4. Gait  x 250 feet with Modified [place] using Rolling Walker.     Outcome: Ongoing, Progressing     Patient moves well but slowly and is very apprehensive with pain level. She was able to walk with walker to the chair and is very motivated to get moving and return home.

## 2023-04-30 NOTE — NURSING
Report received from IRVING Aguirre. Patient transferred from ICU room 560 to 534. Head to toe assessment complete no acute distress noted at this time. Vital signs stable. Will continue to monitor.   5'0''  pounds +/-10%   (2/10) 150 pounds, (2/13) 153 pounds  %UGT=190% BMI=29.88 -- Based on most recent EMR wt   IBW used to calculate energy needs due to pt's current body weight exceeding 120% of IBW, adjusted for age, CHF, fluids per team

## 2023-04-30 NOTE — PROGRESS NOTES
GYNECOLOGY PROGRESS NOTE  4/30/2023     Admission dx Tuboovarian abscess,pelvic hematoma     Interval History: Pt went for IR drainage of TOA. Procedure unsuccessful and complicated by bleed of R iliac circumflex artery, successfully embolized. Pt remained intubated and was transferred to ICU for acute resuscitation and blood transfusion. Initial Hgb post-procedure was 3.9, however blood sample noted to be diluted. Pt received 2U pRBCs with improvement in Hgb to 12.6.  Pt had isolated fever of 100.6 oat 11:30pm 4/29   pt was extubated yesterday and stabilized in ICU and transferred to Med/Surg floor last night in stable condition. Complains of a lot of gas pains   all events of hospital course and plans for mgmt discussed with pt and significant other yesterday and both understand     Allergies: Review of patient's allergies indicates:  No Known Allergies          Past Medical History:   Diagnosis Date    Diabetes mellitus      Hypertension                 Past Surgical History:   Procedure Laterality Date    BREAST LUMPECTOMY Right      TUBAL LIGATION             MEDS:   No current facility-administered medications on file prior to encounter.             Current Outpatient Medications on File Prior to Encounter   Medication Sig Dispense Refill    ALBUTEROL INHL Inhale 2 puffs into the lungs as needed.        losartan (COZAAR) 25 MG tablet Take 25 mg by mouth once daily.        metFORMIN (GLUCOPHAGE) 1000 MG tablet Take 1,000 mg by mouth 2 (two) times daily with meals.             OB History    No obstetric history on file.            Social History               Socioeconomic History    Marital status:    Tobacco Use    Smoking status: Never       Passive exposure: Never    Smokeless tobacco: Never   Substance and Sexual Activity    Alcohol use: Never    Drug use: Never    Sexual activity: Yes       Partners: Male            History reviewed. No pertinent family history.     Vitals:  Vitals          Vitals:      04/29/23 0715 04/29/23 0730 04/29/23 0745 04/29/23 0828   BP: (!) 153/73 (!) 154/71  Comment: Simultaneous filing. User may not have seen previous data. (!) 157/72 (!) 155/70   BP Location:     Right arm     Patient Position:     Lying     Pulse: 105 105  Comment: Simultaneous filing. User may not have seen previous data. 105     Resp: (!) 24 (!) 24  Comment: Simultaneous filing. User may not have seen previous data. (!) 25 (!) 25   Temp:     99.5 °F (37.5 °C)     TempSrc:     Axillary     SpO2: 99% 100%  Comment: Simultaneous filing. User may not have seen previous data. 100%     Weight:           Height:                       Physical Exam:   General: NAD, WNWD  CV: RRR, S1 and S2 present  Pulm: CTAB, limited air movement due to discomfort from abd distention  ABD: tender throughout,  Distended, TTP RUQ/RLQ/suprapubic, no apparent guarding, hypoactive BS throughout  Ext: No edema, scd's in place     Lab Results   Component Value Date    WBC 13.22 (H) 04/30/2023    HGB 11.0 (L) 04/30/2023    HCT 34.9 (L) 04/30/2023    MCV 90 04/30/2023     04/30/2023      CMP  Sodium   Date Value Ref Range Status   04/30/2023 135 (L) 136 - 145 mmol/L Final     Potassium   Date Value Ref Range Status   04/30/2023 3.3 (L) 3.5 - 5.1 mmol/L Final     Chloride   Date Value Ref Range Status   04/30/2023 104 95 - 110 mmol/L Final     CO2   Date Value Ref Range Status   04/30/2023 20 (L) 23 - 29 mmol/L Final     Glucose   Date Value Ref Range Status   04/30/2023 233 (H) 70 - 110 mg/dL Final     BUN   Date Value Ref Range Status   04/30/2023 8 6 - 20 mg/dL Final     Creatinine   Date Value Ref Range Status   04/30/2023 0.8 0.5 - 1.4 mg/dL Final     Calcium   Date Value Ref Range Status   04/30/2023 9.2 8.7 - 10.5 mg/dL Final     Total Protein   Date Value Ref Range Status   04/30/2023 7.3 6.0 - 8.4 g/dL Final     Albumin   Date Value Ref Range Status   04/30/2023 2.6 (L) 3.5 - 5.2 g/dL Final     Total Bilirubin   Date Value Ref  Range Status   04/30/2023 0.9 0.1 - 1.0 mg/dL Final     Comment:     For infants and newborns, interpretation of results should be based  on gestational age, weight and in agreement with clinical  observations.    Premature Infant recommended reference ranges:  Up to 24 hours.............<8.0 mg/dL  Up to 48 hours............<12.0 mg/dL  3-5 days..................<15.0 mg/dL  6-29 days.................<15.0 mg/dL       Alkaline Phosphatase   Date Value Ref Range Status   04/30/2023 68 55 - 135 U/L Final     AST   Date Value Ref Range Status   04/30/2023 15 10 - 40 U/L Final     ALT   Date Value Ref Range Status   04/30/2023 12 10 - 44 U/L Final     Anion Gap   Date Value Ref Range Status   04/30/2023 11 8 - 16 mmol/L Final     eGFR   Date Value Ref Range Status   04/30/2023 >60 >60 mL/min/1.73 m^2 Final    CTA ABDOMEN AND PELVIS     TECHNIQUE:  Using 75 cc of  Omnipaque 350 IV, and multi-detector helical CT technique, axial CT angiogram images of the abdomen were obtained from the lung bases through the pelvis. Precontrast and portal venous phase images of the abdomen and pelvis also done. 2D post-processing coronal and sagittal reconstructions of the abdominal aorta and visceral arteries performed.     COMPARISON:  CT, 04/28/2023 at 16:24 hours     FINDINGS:  There is a large retroperitoneal hematoma on the right extending from the tip of the liver is through the lateral conal fascia is to just proximal to the inguinal ligament.  No groin hematoma is evident.     Following contrast administration there is active extravasation in the central portion of the hematoma suggesting active ooze is.     No other extravasation is seen elsewhere.  Heterogeneity and enlarged uterus with a tubular structure in the right adnexa again noted compatible with hydro pyosalpinx.     The lung bases are well aerated.  No consolidation, suspicious nodules, or pleural effusion.  The visualized portions of the heart appear normal.     The  esophagus, stomach, spleen, pancreas, and adrenal glands are unremarkable.     The liver is normal in size and attenuation without focal abnormality.  The portal veins appear patent.  The gallbladder shows no evidence of stones or cholecystitis.  No intra-or extrahepatic biliary ductal dilatation.     The kidneys demonstrate normal enhancement.  No renal mass, nephrolithiasis or hydronephrosis.  The ureters are normal in course and caliber. The urinary bladder appears unremarkable     No evidence of gastrointestinal hemorrhage, bowel obstruction, or inflammation.  There is no ascites, free fluid, or intraperitoneal free air. No significant peritoneal or retroperitoneal adenopathy.     The abdominal aorta is normal in course and caliber without significant atherosclerotic calcifications.     The osseous structures and extraperitoneal soft tissues are unremarkable.     Impression:     Retroperitoneal hematoma in the lateral conal fascia of the right iliac fossa abdomen with active extravasation.  No significant increase in size since the earlier CT.     Persistent enlarged heterogeneous multi fibroid uterus and tubular fluid-filled structure in the adnexa on the right compatible with hydro pyosalpinx.     No other acute finding evident within the abdomen or pelvis.        Electronically signed by: Austin Byers  Date:                                            04/28/2023  Time:                                           23:57  ---------------------------------------------------------------------------------------------  AP View(s) of the abdomen was performed.     COMPARISON:  None     FINDINGS:  Two views abdomen supine.     Air and stool is seen within the large bowel and projected over the rectum.  There are several scattered prominent air-filled small bowel loops throughout the abdomen and pelvis.  No findings to suggest pneumatosis.  There are no calcifications to suggest nephrolithiasis or cholelithiasis.  The  osseous structures are intact.  There is coarse interstitial attenuation of the visualized pulmonary parenchyma.     Impression:     1. Slow flow through the small bowel, could reflect developing ileus, correlation and follow-up is advised.        Electronically signed by: Kobe Pugh MD  Date:                                            04/29/2023  Time:                                           14:46      Assessment:  Chelsi Johnson is a 43 y.o. female with PMH T2DM, HTN who was admitted for tubo-ovarian abscess. Went for IR drainage of TOA on 4/28/23 complicated by bleed of R iliac circumflex artery, successfully embolized. TOA unable to be accessed. Pt then required transfer to ICU intubated for continued resuscitation and management after arterial bleed. Received 2U pRBCs with resolution of acute blood loss anemia.      Plan:     #TOA  - Daily CBC, WBC improving  - GCCT  still pending  - Blood cx x 2 NGTD  - Continue rocephin/doxy/flagyl. Elevated WBC and isolated fever  Will keep antibiotics as is for today and continue to monitor.   -  will defer any further intervention at this time given complication with initial procedure.   - If TOA persistent or no improvement with IV antibiotics, may need diagnostic Lap possible salpingectomy and possible oophorectomy. And evacuation of pelvic hematoma. However needs more time on antibiotics      #Pelvic arterial bleed  - Complication of attempt at TOA drainage 4/29/23. Successfully embolized. S/p 2U pRBCs 4/29.   - Bleed appears resolved, Hgb stable this AM. No longer need to trend H/H  Will follow IR recs for re- imaging     #T2DM  - Hold home metformin while inpatient   - Hgb A1C 10%, will need follow-up with PCP for further management outpatient  - Daily CMP  - MDSSI with goal glucose 140-180  Medicine consults placed for dm mgmt inpt, will follow there recs in optimizing BS control  - BS #'s improving will likely need more PM Lantus     #HTN  - Losartan  initially held due to hypotension from acute blood loss however this has resolved. Not back to baseline HTN  - - will follow medicine recs    Ulcer PPX: pepcid   DVT PPX: SCDs in place   will work on ambulation today    Diet: diabetic diet  Dispo: Home pending clinical improvement and AF x 48 with resolution of leukocytosis

## 2023-04-30 NOTE — NURSING TRANSFER
Nursing Transfer Note      4/29/2023     Report given to Alexandria LEMUS RN      Reason patient is being transferred: Step down ICU    Transfer  to :    Transfer via bed    Transported by Timothy VILLATORO    Medicines sent: yes    Any special needs or follow-up needed: n/a    Chart send with patient: Yes    Notified: daughter- at bedside    Patient reassessed at: 2200 (date, time)    Upon arrival to floor: patient oriented to room, call bell in reach, and bed in lowest position

## 2023-05-01 LAB
ALBUMIN SERPL BCP-MCNC: 2.5 G/DL (ref 3.5–5.2)
ALP SERPL-CCNC: 66 U/L (ref 55–135)
ALT SERPL W/O P-5'-P-CCNC: 11 U/L (ref 10–44)
ANION GAP SERPL CALC-SCNC: 10 MMOL/L (ref 8–16)
AST SERPL-CCNC: 12 U/L (ref 10–40)
BASOPHILS # BLD AUTO: 0.03 K/UL (ref 0–0.2)
BASOPHILS NFR BLD: 0.3 % (ref 0–1.9)
BILIRUB SERPL-MCNC: 0.8 MG/DL (ref 0.1–1)
BUN SERPL-MCNC: 9 MG/DL (ref 6–20)
C TRACH DNA SPEC QL NAA+PROBE: DETECTED
CALCIUM SERPL-MCNC: 9.1 MG/DL (ref 8.7–10.5)
CHLORIDE SERPL-SCNC: 103 MMOL/L (ref 95–110)
CO2 SERPL-SCNC: 21 MMOL/L (ref 23–29)
CREAT SERPL-MCNC: 0.8 MG/DL (ref 0.5–1.4)
DIFFERENTIAL METHOD: ABNORMAL
EOSINOPHIL # BLD AUTO: 0 K/UL (ref 0–0.5)
EOSINOPHIL NFR BLD: 0.3 % (ref 0–8)
ERYTHROCYTE [DISTWIDTH] IN BLOOD BY AUTOMATED COUNT: 13.3 % (ref 11.5–14.5)
EST. GFR  (NO RACE VARIABLE): >60 ML/MIN/1.73 M^2
GLUCOSE SERPL-MCNC: 233 MG/DL (ref 70–110)
HCT VFR BLD AUTO: 31 % (ref 37–48.5)
HGB BLD-MCNC: 10.1 G/DL (ref 12–16)
IMM GRANULOCYTES # BLD AUTO: 0.04 K/UL (ref 0–0.04)
IMM GRANULOCYTES NFR BLD AUTO: 0.5 % (ref 0–0.5)
LYMPHOCYTES # BLD AUTO: 0.9 K/UL (ref 1–4.8)
LYMPHOCYTES NFR BLD: 10.7 % (ref 18–48)
MAGNESIUM SERPL-MCNC: 2 MG/DL (ref 1.6–2.6)
MCH RBC QN AUTO: 28.5 PG (ref 27–31)
MCHC RBC AUTO-ENTMCNC: 32.6 G/DL (ref 32–36)
MCV RBC AUTO: 88 FL (ref 82–98)
MONOCYTES # BLD AUTO: 0.6 K/UL (ref 0.3–1)
MONOCYTES NFR BLD: 6.9 % (ref 4–15)
N GONORRHOEA DNA SPEC QL NAA+PROBE: NOT DETECTED
NEUTROPHILS # BLD AUTO: 7.1 K/UL (ref 1.8–7.7)
NEUTROPHILS NFR BLD: 81.3 % (ref 38–73)
NRBC BLD-RTO: 0 /100 WBC
PLATELET # BLD AUTO: 354 K/UL (ref 150–450)
PMV BLD AUTO: 10 FL (ref 9.2–12.9)
POCT GLUCOSE: 218 MG/DL (ref 70–110)
POCT GLUCOSE: 231 MG/DL (ref 70–110)
POCT GLUCOSE: 261 MG/DL (ref 70–110)
POCT GLUCOSE: 277 MG/DL (ref 70–110)
POTASSIUM SERPL-SCNC: 3.3 MMOL/L (ref 3.5–5.1)
PROT SERPL-MCNC: 7.3 G/DL (ref 6–8.4)
RBC # BLD AUTO: 3.54 M/UL (ref 4–5.4)
SODIUM SERPL-SCNC: 134 MMOL/L (ref 136–145)
WBC # BLD AUTO: 8.72 K/UL (ref 3.9–12.7)

## 2023-05-01 PROCEDURE — 25000003 PHARM REV CODE 250: Performed by: OBSTETRICS & GYNECOLOGY

## 2023-05-01 PROCEDURE — C1751 CATH, INF, PER/CENT/MIDLINE: HCPCS

## 2023-05-01 PROCEDURE — 25000003 PHARM REV CODE 250: Performed by: STUDENT IN AN ORGANIZED HEALTH CARE EDUCATION/TRAINING PROGRAM

## 2023-05-01 PROCEDURE — 85025 COMPLETE CBC W/AUTO DIFF WBC: CPT | Performed by: OBSTETRICS & GYNECOLOGY

## 2023-05-01 PROCEDURE — 63600175 PHARM REV CODE 636 W HCPCS: Performed by: STUDENT IN AN ORGANIZED HEALTH CARE EDUCATION/TRAINING PROGRAM

## 2023-05-01 PROCEDURE — 25000003 PHARM REV CODE 250

## 2023-05-01 PROCEDURE — S0030 INJECTION, METRONIDAZOLE: HCPCS | Performed by: OBSTETRICS & GYNECOLOGY

## 2023-05-01 PROCEDURE — 99900035 HC TECH TIME PER 15 MIN (STAT)

## 2023-05-01 PROCEDURE — 21400001 HC TELEMETRY ROOM

## 2023-05-01 PROCEDURE — A4216 STERILE WATER/SALINE, 10 ML: HCPCS | Performed by: STUDENT IN AN ORGANIZED HEALTH CARE EDUCATION/TRAINING PROGRAM

## 2023-05-01 PROCEDURE — 63600175 PHARM REV CODE 636 W HCPCS: Performed by: OBSTETRICS & GYNECOLOGY

## 2023-05-01 PROCEDURE — 94761 N-INVAS EAR/PLS OXIMETRY MLT: CPT

## 2023-05-01 PROCEDURE — 36569 INSJ PICC 5 YR+ W/O IMAGING: CPT

## 2023-05-01 PROCEDURE — 97530 THERAPEUTIC ACTIVITIES: CPT | Mod: CQ

## 2023-05-01 PROCEDURE — 97116 GAIT TRAINING THERAPY: CPT | Mod: CQ

## 2023-05-01 PROCEDURE — 83735 ASSAY OF MAGNESIUM: CPT | Performed by: OBSTETRICS & GYNECOLOGY

## 2023-05-01 PROCEDURE — 80053 COMPREHEN METABOLIC PANEL: CPT | Performed by: STUDENT IN AN ORGANIZED HEALTH CARE EDUCATION/TRAINING PROGRAM

## 2023-05-01 RX ORDER — SODIUM CHLORIDE 0.9 % (FLUSH) 0.9 %
10 SYRINGE (ML) INJECTION
Status: DISCONTINUED | OUTPATIENT
Start: 2023-05-01 | End: 2023-05-03 | Stop reason: HOSPADM

## 2023-05-01 RX ORDER — SODIUM CHLORIDE 0.9 % (FLUSH) 0.9 %
10 SYRINGE (ML) INJECTION EVERY 6 HOURS
Status: DISCONTINUED | OUTPATIENT
Start: 2023-05-01 | End: 2023-05-03 | Stop reason: HOSPADM

## 2023-05-01 RX ORDER — POTASSIUM CHLORIDE 20 MEQ/1
40 TABLET, EXTENDED RELEASE ORAL
Status: COMPLETED | OUTPATIENT
Start: 2023-05-01 | End: 2023-05-01

## 2023-05-01 RX ORDER — SODIUM CHLORIDE 9 MG/ML
INJECTION, SOLUTION INTRAVENOUS
Status: DISCONTINUED | OUTPATIENT
Start: 2023-05-01 | End: 2023-05-03 | Stop reason: HOSPADM

## 2023-05-01 RX ADMIN — OXYCODONE HYDROCHLORIDE AND ACETAMINOPHEN 1 TABLET: 5; 325 TABLET ORAL at 08:05

## 2023-05-01 RX ADMIN — METRONIDAZOLE 500 MG: 5 INJECTION, SOLUTION INTRAVENOUS at 08:05

## 2023-05-01 RX ADMIN — DOXYCYCLINE 100 MG: 100 INJECTION, POWDER, LYOPHILIZED, FOR SOLUTION INTRAVENOUS at 09:05

## 2023-05-01 RX ADMIN — SODIUM CHLORIDE, PRESERVATIVE FREE 10 ML: 5 INJECTION INTRAVENOUS at 05:05

## 2023-05-01 RX ADMIN — CEFTRIAXONE 1 G: 1 INJECTION, POWDER, FOR SOLUTION INTRAMUSCULAR; INTRAVENOUS at 03:05

## 2023-05-01 RX ADMIN — SODIUM CHLORIDE, PRESERVATIVE FREE 10 ML: 5 INJECTION INTRAVENOUS at 11:05

## 2023-05-01 RX ADMIN — INSULIN ASPART 4 UNITS: 100 INJECTION, SOLUTION INTRAVENOUS; SUBCUTANEOUS at 06:05

## 2023-05-01 RX ADMIN — SIMETHICONE 125 MG: 125 TABLET, CHEWABLE ORAL at 05:05

## 2023-05-01 RX ADMIN — MUPIROCIN: 20 OINTMENT TOPICAL at 09:05

## 2023-05-01 RX ADMIN — METOCLOPRAMIDE 10 MG: 10 TABLET ORAL at 09:05

## 2023-05-01 RX ADMIN — METOCLOPRAMIDE 10 MG: 10 TABLET ORAL at 03:05

## 2023-05-01 RX ADMIN — METRONIDAZOLE 500 MG: 5 INJECTION, SOLUTION INTRAVENOUS at 12:05

## 2023-05-01 RX ADMIN — INSULIN ASPART 2 UNITS: 100 INJECTION, SOLUTION INTRAVENOUS; SUBCUTANEOUS at 09:05

## 2023-05-01 RX ADMIN — SODIUM CHLORIDE, PRESERVATIVE FREE 10 ML: 5 INJECTION INTRAVENOUS at 12:05

## 2023-05-01 RX ADMIN — POTASSIUM CHLORIDE 40 MEQ: 1500 TABLET, EXTENDED RELEASE ORAL at 11:05

## 2023-05-01 RX ADMIN — SODIUM CHLORIDE: 0.9 INJECTION, SOLUTION INTRAVENOUS at 09:05

## 2023-05-01 RX ADMIN — METRONIDAZOLE 500 MG: 5 INJECTION, SOLUTION INTRAVENOUS at 04:05

## 2023-05-01 RX ADMIN — CHLORHEXIDINE GLUCONATE 0.12% ORAL RINSE 15 ML: 1.2 LIQUID ORAL at 08:05

## 2023-05-01 RX ADMIN — SODIUM CHLORIDE AND POTASSIUM CHLORIDE: 9; 2.98 INJECTION, SOLUTION INTRAVENOUS at 12:05

## 2023-05-01 RX ADMIN — MUPIROCIN: 20 OINTMENT TOPICAL at 08:05

## 2023-05-01 RX ADMIN — LOSARTAN POTASSIUM 50 MG: 50 TABLET, FILM COATED ORAL at 08:05

## 2023-05-01 RX ADMIN — METOCLOPRAMIDE 10 MG: 10 TABLET ORAL at 08:05

## 2023-05-01 RX ADMIN — IBUPROFEN 600 MG: 600 TABLET ORAL at 12:05

## 2023-05-01 RX ADMIN — INSULIN ASPART 6 UNITS: 100 INJECTION, SOLUTION INTRAVENOUS; SUBCUTANEOUS at 11:05

## 2023-05-01 RX ADMIN — INSULIN ASPART 6 UNITS: 100 INJECTION, SOLUTION INTRAVENOUS; SUBCUTANEOUS at 05:05

## 2023-05-01 RX ADMIN — AMLODIPINE BESYLATE 5 MG: 5 TABLET ORAL at 08:05

## 2023-05-01 RX ADMIN — IBUPROFEN 600 MG: 600 TABLET ORAL at 05:05

## 2023-05-01 RX ADMIN — POTASSIUM BICARBONATE 40 MEQ: 391 TABLET, EFFERVESCENT ORAL at 05:05

## 2023-05-01 RX ADMIN — IBUPROFEN 600 MG: 600 TABLET ORAL at 11:05

## 2023-05-01 RX ADMIN — SIMETHICONE 125 MG: 125 TABLET, CHEWABLE ORAL at 11:05

## 2023-05-01 RX ADMIN — POTASSIUM CHLORIDE 40 MEQ: 1500 TABLET, EXTENDED RELEASE ORAL at 09:05

## 2023-05-01 RX ADMIN — OXYCODONE HYDROCHLORIDE AND ACETAMINOPHEN 1 TABLET: 5; 325 TABLET ORAL at 09:05

## 2023-05-01 RX ADMIN — FAMOTIDINE 20 MG: 20 TABLET, FILM COATED ORAL at 08:05

## 2023-05-01 NOTE — NURSING
" PROACTIVE NURSE ROUNDING NOTE     Time of Visit:     Admit Date: 2023  LOS: 2  Code Status: Full Code   Date of Visit: 2023  : 1980  Age: 43 y.o.  Sex: female  Race: Black or   Bed: K522/K522 A:   MRN: [id number]  Was the patient discharged from an ICU this admission? Yes   Was the patient discharged from a PACU within last 24 hours?  No  Did the patient receive conscious sedation/general anesthesia in last 24 hours?  Yes  Was the patient in the ED within the past 24 hours?  No  Was the patient started on NIPPV within the past 24 hours?  Yes  Attending Physician: Cirilo Sampson MD  Primary Service: Networked reference to record PCT     ASSESSMENT     Notified by charge RN via phone call.  Reason for alert: Patient looks extremely anxious and is in a lot of pain.     Diagnosis: Tubo-ovarian abscess    Abnormal Vital Signs: BP (!) 161/85 (BP Location: Right arm, Patient Position: Lying)   Pulse (!) 111   Temp (!) 101.6 °F (38.7 °C) Comment: RECTAL  Resp (!) 30   Ht 5' 4" (1.626 m)   Wt 104 kg (229 lb 4.5 oz)   LMP 2023   SpO2 97%   Breastfeeding No   BMI 39.36 kg/m²      Clinical Issues:  RLQ pain.    Patient  has a past medical history of Diabetes mellitus and Hypertension.      Patient lying in bed in 10/10 pain in RLQ.  Groin site assessed and pulses distal palpable. Patient has positive bowel sounds and reports two bowel movements today.  Respiratory rate increased when assisted to BSC.  Urine looks very concentrated.  Patient coughing up green sputum.  Pain regimen adjusted.  Rectal temp 101.6.    INTERVENTIONS/ RECOMMENDATIONS      Patient received 2 mg of morphine, oxy/acet 5-325, and 650 mg of tylenol.    Asked tech to reassess vital signs in 45 min.     Rec PICC line, patient has one access and is a hard stick.  She has multiple IV antibiotics scheduled along with IVF with   potassium continuously.    H/H stable.     Discussed plan of care with RN, " Macarena.    PHYSICIAN ESCALATION     Yes/No  Yes    Orders received and case discussed with Dr. Dong .  Team denied request for scan but did agree to PICC line placement.      Disposition: Remain in room 522.    FOLLOW-UP     Call back the Rapid Response Nurse, Yolanda Dennis RN at [phone] for additional questions or concerns.

## 2023-05-01 NOTE — CARE UPDATE
Patient in increased pain despite two small BM today. Discussed with OBGYN, Dr Sampson. Suspect need for greater pain control. Adjusted pain regimen and added daily pepcid.

## 2023-05-01 NOTE — PLAN OF CARE
Problem: Adult Inpatient Plan of Care  Goal: Plan of Care Review  Outcome: Ongoing, Progressing  Flowsheets (Taken 5/1/2023 0557)  Plan of Care Reviewed With:   patient   daughter     Problem: Infection  Goal: Absence of Infection Signs and Symptoms  Outcome: Ongoing, Progressing  Intervention: Prevent or Manage Infection  Flowsheets (Taken 5/1/2023 0557)  Infection Management: aseptic technique maintained     Problem: Pain Acute  Goal: Acceptable Pain Control and Functional Ability  Outcome: Ongoing, Progressing  Intervention: Develop Pain Management Plan  Flowsheets (Taken 5/1/2023 0557)  Pain Management Interventions: pain management plan reviewed with patient/caregiver  Intervention: Prevent or Manage Pain  Flowsheets (Taken 5/1/2023 0557)  Sensory Stimulation Regulation:   lighting decreased   quiet environment promoted     Vitals:    05/01/23 0530   BP:    Pulse:    Resp:    Temp: 99.8 °F (37.7 °C)     RECTAL TEMP AS ABOVE

## 2023-05-01 NOTE — PROGRESS NOTES
Consult note  [facility]    Consulted by: Cirilo Sampson MD  Reason for Consult: T2DM and HTN    History of Present Illness: Patient is a 43 y.o. female with a PMHx T2DM, HTN here for bilateral tubo-ovarian abscesses. IR attempted drainage of the right tubo-ovarian ovarian abscess but was complicated by extravasation of the right circumflex artery resulting in formation of large hematoma which required emergent embolization. Patient extubated on 04/28/2023 and remains admitted within the ICU for closer monitoring. Patient is receiving IV antibiotics for her TOA including ceftriaxone, doxycycline, and metronidazole. [facility] has been consulted for management of patient's type 2 diabetes and hypertension.    Patient endorses she is currently taking losartan 25 mg daily and metformin 1000 mg daily at home. Hemoglobin A1c on admission was 10% and patient has maintained BG levels in the upper 200s requiring detemir and SSI. Patient currently on detemir 15 units nightly plus SSI.    Interval Hx:  Pt has ongoing RLQ pain, but improving. BP improving. Denies N/V.    PTA Medications   Medication Sig    ALBUTEROL INHL Inhale 2 puffs into the lungs as needed.    losartan (COZAAR) 25 MG tablet Take 25 mg by mouth once daily.    metFORMIN (GLUCOPHAGE) 1000 MG tablet Take 1,000 mg by mouth 2 (two) times daily with meals.       Review of patient's allergies indicates:  No Known Allergies    Past Medical History:   Diagnosis Date    Diabetes mellitus     Hypertension      Past Surgical History:   Procedure Laterality Date    BREAST LUMPECTOMY Right     TUBAL LIGATION       History reviewed. No pertinent family history.  Social History     Tobacco Use    Smoking status: Never     Passive exposure: Never    Smokeless tobacco: Never   Substance Use Topics    Alcohol use: Never    Drug use: Never       Review of Systems:   Review of Systems   Constitutional:  Negative for fever.   Respiratory:  Negative for  shortness of breath.    Cardiovascular:  Negative for chest pain.   Gastrointestinal:  Positive for abdominal pain. Negative for nausea and vomiting.   Musculoskeletal:  Negative for back pain.   Neurological:  Negative for headaches.      OBJECTIVE:     Vital Signs (Most Recent)  Temp: 97.3 °F (36.3 °C) (05/01/23 1126)  Pulse: 96 (05/01/23 1126)  Resp: 18 (05/01/23 1126)  BP: 130/76 (05/01/23 1126)  SpO2: 98 % (05/01/23 1152)    Physical Exam:  Gen - WNWD, NAD  CV - tachycardic with normal sinus rhythm, no LE edema  Resp - breathing comfortably on RA, CTAB  Abd - diffuse tenderness but maximal at RLQ soft (improving), distended abdomen, BS normal, no rebound tenderness or guarding  Psych - logical thought process, answers questions appropriately     Laboratory  Lab Results   Component Value Date    WBC 13.22 (H) 04/30/2023    HGB 11.5 (L) 04/30/2023    HCT 35.9 (L) 04/30/2023    MCV 90 04/30/2023     04/30/2023      CMP  Sodium   Date Value Ref Range Status   05/01/2023 134 (L) 136 - 145 mmol/L Final     Potassium   Date Value Ref Range Status   05/01/2023 3.3 (L) 3.5 - 5.1 mmol/L Final     Chloride   Date Value Ref Range Status   05/01/2023 103 95 - 110 mmol/L Final     CO2   Date Value Ref Range Status   05/01/2023 21 (L) 23 - 29 mmol/L Final     Glucose   Date Value Ref Range Status   05/01/2023 233 (H) 70 - 110 mg/dL Final     BUN   Date Value Ref Range Status   05/01/2023 9 6 - 20 mg/dL Final     Creatinine   Date Value Ref Range Status   05/01/2023 0.8 0.5 - 1.4 mg/dL Final     Calcium   Date Value Ref Range Status   05/01/2023 9.1 8.7 - 10.5 mg/dL Final     Total Protein   Date Value Ref Range Status   05/01/2023 7.3 6.0 - 8.4 g/dL Final     Albumin   Date Value Ref Range Status   05/01/2023 2.5 (L) 3.5 - 5.2 g/dL Final     Total Bilirubin   Date Value Ref Range Status   05/01/2023 0.8 0.1 - 1.0 mg/dL Final     Comment:     For infants and newborns, interpretation of results should be based  on  gestational age, weight and in agreement with clinical  observations.    Premature Infant recommended reference ranges:  Up to 24 hours.............<8.0 mg/dL  Up to 48 hours............<12.0 mg/dL  3-5 days..................<15.0 mg/dL  6-29 days.................<15.0 mg/dL       Alkaline Phosphatase   Date Value Ref Range Status   05/01/2023 66 55 - 135 U/L Final     AST   Date Value Ref Range Status   05/01/2023 12 10 - 40 U/L Final     ALT   Date Value Ref Range Status   05/01/2023 11 10 - 44 U/L Final     Anion Gap   Date Value Ref Range Status   05/01/2023 10 8 - 16 mmol/L Final        Lab Results   Component Value Date    INR 1.1 04/29/2023    INR 1.6 (H) 04/28/2023      No results for input(s): CPK, CPKMB, TROPONINI, MB in the last 168 hours.   No results for input(s): TROPONINI, CKTOTAL, CKMB in the last 168 hours.    BNP  No results for input(s): BNP in the last 168 hours.    Urinalysis  No results for input(s): COLORU, CLARITYU, SPECGRAV, PHUR, PROTEINUA, GLUCOSEU, BILIRUBINCON, BLOODU, WBCU, RBCU, BACTERIA, MUCUS, NITRITE, LEUKOCYTESUR, UROBILINOGEN, HYALINECASTS in the last 24 hours.   LAST HbA1c  Lab Results   Component Value Date    HGBA1C 10.0 (H) 04/28/2023       Diagnostic Results:  Labs: Reviewed  X-Ray: Reviewed    Imaging Results              MRI Abdomen-Pelvis w w/o Contrast (XPD) (Final result)  Result time 04/28/23 08:27:21      Final result by Chencho Joseph DO (04/28/23 08:27:21)                   Impression:      1. Inflamed, distended, fluid-filled tubular structure in the right adnexa/right lower quadrant, favored to represent a distended fallopian tube with hydrosalpinx/pyosalpinx.  Acute appendicitis is felt less likely.  2. Smaller distended tubular structure in the left adnexa, also concerning for hydrosalpinx/pyosalpinx.  3. Large fibroid uterus.  4. Hepatomegaly and hepatic steatosis.      Electronically signed by: Chencho Joseph  Date:    04/28/2023  Time:    08:27                Narrative:    EXAMINATION:  MRI ABDOMEN-PELVIS W W/O CONTRAST (XPD)    CLINICAL HISTORY:  RLQ abdominal pain (Age >= 14y);    TECHNIQUE:  Multisequence, multiplanar MRI of the abdomen performed per liver protocol before and after administration of 10 mL Gadavist intravenous contrast.    COMPARISON:  CT of the abdomen and pelvis from 04/27/2023.    FINDINGS:  Lung bases: The lung bases are grossly clear.    Liver: The liver is enlarged and demonstrates diffuse loss of signal on out of phase imaging compatible with hepatic steatosis.  There are no focal hepatic lesions.    Biliary: No intrahepatic or extrahepatic biliary dilatation.    Gallbladder: The gallbladder is unremarkable without evidence of cholelithiasis, gallbladder wall thickening, or pericholecystic fluid.    Pancreas: Unremarkable.  No pancreatic ductal dilatation.    Spleen: Normal size.  No focal lesions.    Adrenal glands: Unremarkable.    Kidneys: No renal masses.  No hydronephrosis.    GI: Visualized bowel loops are unremarkable.    Pelvis: There is an inflamed dilated tubular structure in the right adnexa/right lower quadrant measuring up to approximately 2.3 cm in maximal diameter, with adjacent surrounding edema and fat stranding.  The tubular structure does not definitively arise from the cecum and is favored to represent a distended fallopian tube with hydrosalpinx/pyosalpinx.  The appendix is not definitively seen separate from this structure.  There is an additional smaller tubular structure in the left adnexa with a diameter of approximately 1.1 cm, without significant surrounding edema.    Reproductive: There are multiple large uterine fibroids.  There is trace free fluid in the pelvis.    Mesentery: No ascites. No focal fluid collection. No free air.    Body wall: Unremarkable.    Musculoskeletal: Marrow signal is unremarkable.                                       CT Abdomen Pelvis With Contrast (Final result)  Result time 04/27/23  20:39:21      Final result by Lobo Tobias MD (04/27/23 20:39:21)                   Impression:      Enlarged multi fibroid uterus.  Recommend clinical correlation for fibroid degeneration.  Recommend follow-up MRI with contrast for further evaluation on a nonemergent basis    Fatty infiltration liver with hepatomegaly    There is a dilated cystic structure with peripheral enhancement in the right lower quadrant measuring up to 24 mm in diameter may relate to an inflamed appendix.  Differential diagnosis includes dilated right-sided fallopian tubes which is not well delineated.  Additional consideration includes oral contrast administration and repeat CT.    All CT scans at this facility use dose modulation, iterative reconstruction, and/or weight based dosing when appropriate to reduce radiation dose to as low as reasonably achievable.      Electronically signed by: oLbo Tobias  Date:    04/27/2023  Time:    20:39               Narrative:    EXAMINATION:  CT ABDOMEN PELVIS WITH CONTRAST    CLINICAL HISTORY:  RLQ abdominal pain (Age >= 14y);    TECHNIQUE:  Low dose axial images, sagittal and coronal reformations were obtained from the lung bases to the pubic symphysis following the IV administration of 100 mL of Omnipaque 350.    COMPARISON:  None    FINDINGS:  Heart: Normal size as far as seen. No effusion as far as seen.    Lung Bases: Clear.    Liver: Fatty infiltration of liver with hepatomegaly..  No focal lesions.    Gallbladder: No calcified gallstones.    Bile Ducts: No dilatation.    Pancreas: No mass. No peripancreatic fat stranding.    Spleen: Normal.    Adrenals: Normal.    Kidneys/Ureters: Normal enhancement.  No mass or  hydroureteronephrosis.    Bladder: No wall thickening.    Reproductive organs: Heterogeneous clean hand sing enlarged multi fibroid uterus.    GI Tract/Mesentery: No evidence of bowel obstruction or inflammation.    There is a dilated cystic structure with peripheral enhancement in  the right lower quadrant measuring up to 24 mm in diameter may relate to an inflamed appendix. Differential diagnosis includes dilated right-sided fallopian tubes which is not well delineated. Additional consideration includes oral contrast administration and repeat CT.    Peritoneal Space: No ascites or free air.    Retroperitoneum: No significant adenopathy.    Abdominal wall: Normal.    Vasculature: No aneurysm.    Bones: No acute fracture. No suspicious lytic or sclerotic lesions.                                      ASSESSMENT/PLAN:   43 y.o.female has a past medical history of Diabetes mellitus and Hypertension. here for tubo-ovarian abscess complicated by extravasation of the right circumflex artery resulting in formation of large hematoma requiring emergent embolization. S/p extubation on 04/28/2023. Family Medicine consulted for inpatient management of HTN and DM. Patient currently hemodynamically stable on the floor.      #TOA  # Pelvic arterial bleed  - Management per Primary Team - OBGYN    #Essential Hypertension   - /74 this AM, improving  - Home medications include losartan 25 mg daily.  Within the past 24 hours, patient with improved BP reading showing systolics in the 130-140s and diastolics in the 60-80s.   - Continue losartan 50 mg daily  - Can use IV hydralazine or labetolol PRN for SYS > 180  - Can consider additional hypertensive agents if BP uncontrolled     #T2DM  - HbA1c 10% on admission  - Initially on insulin detemir 15 units nightly + SSI --> changed to detemir 12 units BID  -  this AM  - Continue to titrate as needed to a blood glucose goal of 140-180 while inpatient     Family medicine will continue to follow. Please contact us if you have any further questions. Thank you for the consult.      Yoana Ribeiro MD, MPH  [facility] PGY-1  05/01/2023

## 2023-05-01 NOTE — PLAN OF CARE
Chart reviewed   dx:  Tubo-ovarian abscess s/p embolization of hematoma      therapy rec HH/RW  - CM will  send Secure Chat to GYN to ask if he feels pt requires this at discharge .            05/01/23 9000   Post-Acute Status   Post-Acute Authorization Other  (TBD)   Discharge Delays (!) Other  (pending medical stability)   Discharge Plan   Discharge Plan A Home;Home with family   Discharge Plan B Home;Home with family;Home Health

## 2023-05-01 NOTE — NURSING
NURSE PROACTIVE ROUNDING NOTE       Time of Chart Review: 1755    Admit Date: 2023  LOS: 3  Code Status: Full Code   Date of Visit: 2023  : 1980  Age: 43 y.o.  Sex: female  Race: Black or   Bed: K522/K522 A  MRN: [id number]  Was the patient discharged from an ICU this admission? Yes   Was the patient discharged from a PACU within last 24 hours? No   Did the patient receive conscious sedation/general anesthesia in last 24 hours? No   Was the patient in the ED within the past 24 hours? No   Was the patient on NIPPV within the past 24 hours? No   Attending Physician: Cirilo Sampson MD  Primary Service: Obstetrics and Gynecology   Time spent at the bedside: < 15 min    SITUATION    Notified by previous RRN during handoff  Reason for alert: Follow-up    Diagnosis: Tubo-ovarian abscess   has a past medical history of Diabetes mellitus and Hypertension.    Last Vitals:  Temp: 98.5 °F (36.9 °C) (1642)  Pulse: 93 (1642)  Resp: 18 (1642)  BP: 134/79 (1642)  SpO2: 98 % (1642)    24 Hour Vitals Range:  Temp:  [97.3 °F (36.3 °C)-101.6 °F (38.7 °C)]   Pulse:  [1-111]   Resp:  [17-30]   BP: (130-161)/(73-90)   SpO2:  [96 %-99 %]       ASSESSMENT/INTERVENTIONS  - Chart reviewed including vital signs, labs, and MD note  - K+ 3.3 --> received 1L NS 40 mEq KCL + due for 40 mEq PO due at 1745  - Repeat CMP ordered for 5/2 AM  - H/H stable: 10.    PROVIDER ESCALATION    Physician escalation: No    Disposition:Remain in room 522    FOLLOW UP    Call back the Rapid Response [facility] at [phone] for additional questions or concerns.

## 2023-05-01 NOTE — NURSING
04/30/23 2042   Patient Request   Patient Requested Proactive round needed; ICU resource nurse already in room, IRVING Guerrero   Nurse Notification   Charge Nurse Notified? Yes   Name of Charge Nurse IRVING Fernandez   Bedside Nurse Notified? Yes   Name of Bedside Nurse IRVING Fiore   Nurse Notfication Method Secure Chat   Nurse Notified Of Other  (MEWS 7)     Temp: (Abnormal) 101.6 °F (38.7 °C) (RECTAL) (04/30/23 2030)  Pulse: (Abnormal) 111 (04/30/23 1925)  Resp: (Abnormal) 30 (04/30/23 2030)  BP: (Abnormal) 161/85 (04/30/23 1925)  SpO2: 97 % (04/30/23 1957)    IRVING Fernandez notified Dr. Dong who assessed the patient and was going to speak with Dr. Sampson.

## 2023-05-01 NOTE — NURSING
Proactive Nurse Follow-up Note     Followed up with patient for proactive rounding. Rectal temp now 100.7  No acute issues at this time. Reviewed plan of care with bedside RN, WENDY Rubin .   Team will continue to follow.  Please call proactive nurse, Yolanda Dennis RN with any questions or concerns at [phone].

## 2023-05-01 NOTE — PROGRESS NOTES
"GYNECOLOGY PROGRESS NOTE  5/1/2023     Admission dx Tuboovarian abscess,pelvic hematoma     Interval History: Pt went for IR drainage of TOA. Procedure unsuccessful and complicated by bleed of R iliac circumflex artery, successfully embolized. Pt remained intubated and was transferred to ICU for acute resuscitation and blood transfusion. Initial Hgb post-procedure was 3.9, however blood sample noted to be diluted. Pt received 2U pRBCs with improvement in Hgb to 12.6. Patient was extubated and stabilized in ICU and transferred to Med/Surg floor in stable condition.     Today, patient had isolated fever of 100.7F at 11:30pm 4/30. Patient with reduced pain today. Patient states she was able to have x2 BM this morning however she did note "dark maroon blood" in one of her bowel movements today. Hemoglobin at 10.1 this AM. Patient also able to work with PT and notes increased energy level today.    Allergies: Review of patient's allergies indicates:  No Known Allergies          Past Medical History:   Diagnosis Date    Diabetes mellitus      Hypertension                 Past Surgical History:   Procedure Laterality Date    BREAST LUMPECTOMY Right      TUBAL LIGATION             MEDS:   No current facility-administered medications on file prior to encounter.             Current Outpatient Medications on File Prior to Encounter   Medication Sig Dispense Refill    ALBUTEROL INHL Inhale 2 puffs into the lungs as needed.        losartan (COZAAR) 25 MG tablet Take 25 mg by mouth once daily.        metFORMIN (GLUCOPHAGE) 1000 MG tablet Take 1,000 mg by mouth 2 (two) times daily with meals.             OB History    No obstetric history on file.            Social History               Socioeconomic History    Marital status:    Tobacco Use    Smoking status: Never       Passive exposure: Never    Smokeless tobacco: Never   Substance and Sexual Activity    Alcohol use: Never    Drug use: Never    Sexual activity: Yes       " Partners: Male            History reviewed. No pertinent family history.     Vitals:  Vitals          Vitals:     04/29/23 0715 04/29/23 0730 04/29/23 0745 04/29/23 0828   BP: (!) 153/73 (!) 154/71  Comment: Simultaneous filing. User may not have seen previous data. (!) 157/72 (!) 155/70   BP Location:     Right arm     Patient Position:     Lying     Pulse: 105 105  Comment: Simultaneous filing. User may not have seen previous data. 105     Resp: (!) 24 (!) 24  Comment: Simultaneous filing. User may not have seen previous data. (!) 25 (!) 25   Temp:     99.5 °F (37.5 °C)     TempSrc:     Axillary     SpO2: 99% 100%  Comment: Simultaneous filing. User may not have seen previous data. 100%     Weight:           Height:                       Physical Exam:   General: NAD, WNWD  CV: RRR, S1 and S2 present  Pulm: CTAB, limited air movement due to discomfort from abd distention  ABD: tender upon palpation to RLQ, no apparent guarding, normal bowel sounds throughout  Ext: No edema, scd's in place     Lab Results   Component Value Date    WBC 8.72 05/01/2023    HGB 10.1 (L) 05/01/2023    HCT 31.0 (L) 05/01/2023    MCV 88 05/01/2023     05/01/2023      CMP  Sodium   Date Value Ref Range Status   05/01/2023 134 (L) 136 - 145 mmol/L Final     Potassium   Date Value Ref Range Status   05/01/2023 3.3 (L) 3.5 - 5.1 mmol/L Final     Chloride   Date Value Ref Range Status   05/01/2023 103 95 - 110 mmol/L Final     CO2   Date Value Ref Range Status   05/01/2023 21 (L) 23 - 29 mmol/L Final     Glucose   Date Value Ref Range Status   05/01/2023 233 (H) 70 - 110 mg/dL Final     BUN   Date Value Ref Range Status   05/01/2023 9 6 - 20 mg/dL Final     Creatinine   Date Value Ref Range Status   05/01/2023 0.8 0.5 - 1.4 mg/dL Final     Calcium   Date Value Ref Range Status   05/01/2023 9.1 8.7 - 10.5 mg/dL Final     Total Protein   Date Value Ref Range Status   05/01/2023 7.3 6.0 - 8.4 g/dL Final     Albumin   Date Value Ref Range  Status   05/01/2023 2.5 (L) 3.5 - 5.2 g/dL Final     Total Bilirubin   Date Value Ref Range Status   05/01/2023 0.8 0.1 - 1.0 mg/dL Final     Comment:     For infants and newborns, interpretation of results should be based  on gestational age, weight and in agreement with clinical  observations.    Premature Infant recommended reference ranges:  Up to 24 hours.............<8.0 mg/dL  Up to 48 hours............<12.0 mg/dL  3-5 days..................<15.0 mg/dL  6-29 days.................<15.0 mg/dL       Alkaline Phosphatase   Date Value Ref Range Status   05/01/2023 66 55 - 135 U/L Final     AST   Date Value Ref Range Status   05/01/2023 12 10 - 40 U/L Final     ALT   Date Value Ref Range Status   05/01/2023 11 10 - 44 U/L Final     Anion Gap   Date Value Ref Range Status   05/01/2023 10 8 - 16 mmol/L Final     eGFR   Date Value Ref Range Status   05/01/2023 >60 >60 mL/min/1.73 m^2 Final     CTA ABDOMEN AND PELVIS     TECHNIQUE:  Using 75 cc of  Omnipaque 350 IV, and multi-detector helical CT technique, axial CT angiogram images of the abdomen were obtained from the lung bases through the pelvis. Precontrast and portal venous phase images of the abdomen and pelvis also done. 2D post-processing coronal and sagittal reconstructions of the abdominal aorta and visceral arteries performed.     COMPARISON:  CT, 04/28/2023 at 16:24 hours     FINDINGS:  There is a large retroperitoneal hematoma on the right extending from the tip of the liver is through the lateral conal fascia is to just proximal to the inguinal ligament.  No groin hematoma is evident.     Following contrast administration there is active extravasation in the central portion of the hematoma suggesting active ooze is.     No other extravasation is seen elsewhere.  Heterogeneity and enlarged uterus with a tubular structure in the right adnexa again noted compatible with hydro pyosalpinx.     The lung bases are well aerated.  No consolidation, suspicious  nodules, or pleural effusion.  The visualized portions of the heart appear normal.     The esophagus, stomach, spleen, pancreas, and adrenal glands are unremarkable.     The liver is normal in size and attenuation without focal abnormality.  The portal veins appear patent.  The gallbladder shows no evidence of stones or cholecystitis.  No intra-or extrahepatic biliary ductal dilatation.     The kidneys demonstrate normal enhancement.  No renal mass, nephrolithiasis or hydronephrosis.  The ureters are normal in course and caliber. The urinary bladder appears unremarkable     No evidence of gastrointestinal hemorrhage, bowel obstruction, or inflammation.  There is no ascites, free fluid, or intraperitoneal free air. No significant peritoneal or retroperitoneal adenopathy.     The abdominal aorta is normal in course and caliber without significant atherosclerotic calcifications.     The osseous structures and extraperitoneal soft tissues are unremarkable.     Impression:     Retroperitoneal hematoma in the lateral conal fascia of the right iliac fossa abdomen with active extravasation.  No significant increase in size since the earlier CT.     Persistent enlarged heterogeneous multi fibroid uterus and tubular fluid-filled structure in the adnexa on the right compatible with hydro pyosalpinx.     No other acute finding evident within the abdomen or pelvis.        Electronically signed by: Austin Byers  Date:                                            04/28/2023  Time:                                           23:57  ---------------------------------------------------------------------------------------------  AP View(s) of the abdomen was performed.     COMPARISON:  None     FINDINGS:  Two views abdomen supine.     Air and stool is seen within the large bowel and projected over the rectum.  There are several scattered prominent air-filled small bowel loops throughout the abdomen and pelvis.  No findings to suggest  pneumatosis.  There are no calcifications to suggest nephrolithiasis or cholelithiasis.  The osseous structures are intact.  There is coarse interstitial attenuation of the visualized pulmonary parenchyma.     Impression:     1. Slow flow through the small bowel, could reflect developing ileus, correlation and follow-up is advised.        Electronically signed by: Kobe Pugh MD  Date:                                            04/29/2023  Time:                                           14:46      Assessment:  Chelsi Johnson is a 43 y.o. female with PMH T2DM, HTN who was admitted for tubo-ovarian abscess. Went for IR drainage of TOA on 4/28/23 complicated by bleed of R iliac circumflex artery, successfully embolized. TOA unable to be accessed. Pt then required transfer to ICU intubated for continued resuscitation and management after arterial bleed. Received 2U pRBCs with resolution of acute blood loss anemia.      Plan:     #TOA  - Daily CBC, WBC improving  - GCCT still pending  - Blood cx x 2 NGTD  - Continue IV rocephin/doxy/flagyl. Patient without leukocytosis today however still with isolated fever last night. Will keep antibiotics as is for today and continue to monitor.   -  will defer any further intervention at this time given complication with initial procedure.   - If TOA persistent or no improvement with IV antibiotics, may need diagnostic Lap possible salpingectomy and possible oophorectomy. And evacuation of pelvic hematoma. However needs more time on antibiotics      #Pelvic arterial bleed  - Complication of attempt at TOA drainage 4/29/23. Successfully embolized. S/p 2U pRBCs 4/29.   - Patient with noted dark maroon bowel movement this AM. Will hold patient's scheduled NSAIDS  - Will follow IR recs for re- imaging if needed     #T2DM  - Hold home metformin while inpatient   - Hgb A1C 10%, will need follow-up with PCP for further management outpatient  - Daily CMP  - MDSSI with goal glucose  140-180  Medicine consults placed for dm mgmt inpt, will follow there recs in optimizing BS control  - BS #'s still in the 200s will likely need more PM Tj, appreciate recs     #HTN  - Losartan initially held due to hypotension from acute blood loss however this has resolved. Not back to baseline HTN  - will follow medicine recs    Ulcer PPX: pepcid   DVT PPX: SCDs in place, continue to work with PT/OT    Diet: diabetic diet  Dispo: Home pending clinical improvement and AF x 48

## 2023-05-01 NOTE — PLAN OF CARE
Problem: Physical Therapy  Goal: Physical Therapy Goal  Description: Goals to be met by: 2023     Patient will increase functional independence with mobility by performin. Supine to sit with Modified [place]  2. Sit to supine with Modified [place]  3. Sit to stand transfer with Modified [place]  4. Gait  x 250 feet with Modified [place] using Rolling Walker.     Outcome: Ongoing, Progressing

## 2023-05-01 NOTE — PT/OT/SLP PROGRESS
Physical Therapy Treatment    Patient Name:  Chelsi Johnson   MRN:  [id number]    Recommendations:     Discharge Recommendations: home health PT  Discharge Equipment Recommendations: walker, rolling  Barriers to discharge:  decreased mobility,strength and endurance    Assessment:     Chelsi Johnson is a 43 y.o. female admitted with a medical diagnosis of Tubo-ovarian abscess.  She presents with the following impairments/functional limitations: weakness, impaired endurance, impaired functional mobility, gait instability, impaired balance, decreased lower extremity function, pain, decreased ROM, impaired coordination, impaired skin,pt with improving status and will benefit from HH services upon discharge .    Rehab Prognosis: Good; patient would benefit from acute skilled PT services to address these deficits and reach maximum level of function.    Recent Surgery: * No surgery found *      Plan:     During this hospitalization, patient to be seen 4 x/week to address the identified rehab impairments via gait training, therapeutic activities, therapeutic exercises, neuromuscular re-education and progress toward the following goals:    Plan of Care Expires:  05/30/23    Subjective     Chief Complaint: n/a  Patient/Family Comments/goals: pt agreeable to rx.  Pain/Comfort:  Pain Rating 1: 4/10  Location - Orientation 1: generalized  Location 1: abdomen  Pain Addressed 1: Reposition, Distraction, Cessation of Activity  Pain Rating Post-Intervention 1: 6/10      Objective:     Communicated with nsg prior to session.  Patient found up in chair with peripheral IV, telemetry upon PT entry to room.     General Precautions: Standard, fall  Orthopedic Precautions: N/A  Braces: N/A  Respiratory Status: Room air     Functional Mobility:  Transfers:     Sit to Stand:  contact guard assistance with rolling walker  Gait: amb ~110' with RW and CGA  Balance: fair standing balance with RW      AM-PAC 6 CLICK MOBILITY  Turning over in bed  (including adjusting bedclothes, sheets and blankets)?: 4  Sitting down on and standing up from a chair with arms (e.g., wheelchair, bedside commode, etc.): 3  Moving from lying on back to sitting on the side of the bed?: 3  Moving to and from a bed to a chair (including a wheelchair)?: 3  Need to walk in hospital room?: 3  Climbing 3-5 steps with a railing?: 3  Basic Mobility Total Score: 19       Treatment & Education: pt required increased time with mobility,no LOB.      Patient left up in chair with all lines intact, call button in reach, nsg notified, and daughter present..    GOALS:   Multidisciplinary Problems       Physical Therapy Goals          Problem: Physical Therapy    Goal Priority Disciplines Outcome Goal Variances Interventions   Physical Therapy Goal     PT, PT/OT Ongoing, Progressing     Description: Goals to be met by: 2023     Patient will increase functional independence with mobility by performin. Supine to sit with Modified [place]  2. Sit to supine with Modified [place]  3. Sit to stand transfer with Modified [place]  4. Gait  x 250 feet with Modified [place] using Rolling Walker.                          Time Tracking:     PT Received On: 23  PT Start Time: 1330     PT Stop Time: 1355  PT Total Time (min): 25 min     Billable Minutes: Gait Training 16 and Therapeutic Activity 9    Treatment Type: Treatment  PT/PTA: PTA     Number of PTA visits since last PT visit: 2023

## 2023-05-01 NOTE — NURSING
Notified Dr. Dong that pharmacy will not approve fluids since the last potassium level was around 0530 this morning. K 3.3

## 2023-05-01 NOTE — PROCEDURES
"Chelsi Johnson is a 43 y.o. female patient.    Temp: 100.3 °F (37.9 °C) (05/01/23 0120)  Pulse: 106 (04/30/23 2330)  Resp: 20 (04/30/23 2330)  BP: 134/73 (04/30/23 2330)  SpO2: 97 % (05/01/23 0015)  Weight: 104 kg (229 lb 4.5 oz) (04/28/23 1926)  Height: 5' 4" (162.6 cm) (04/28/23 1926)    PICC  Date/Time: 5/1/2023 1:35 AM  Performed by: Marques Lane RN  Consent Done: Yes  Time out: Immediately prior to procedure a time out was called to verify the correct patient, procedure, equipment, support staff and site/side marked as required  Indications: med administration and vascular access  Anesthesia: local infiltration  Local anesthetic: lidocaine 1% without epinephrine  Anesthetic Total (mL): 2  Preparation: skin prepped with ChloraPrep  Skin prep agent dried: skin prep agent completely dried prior to procedure  Sterile barriers: all five maximum sterile barriers used - cap, mask, sterile gown, sterile gloves, and large sterile sheet  Hand hygiene: hand hygiene performed prior to central venous catheter insertion  Location details: right basilic  Catheter type: double lumen  Catheter size: 5 Fr  Catheter Length: 41cm    Ultrasound guidance: yes  Vessel Caliber: medium, compressibility normal  Vascular Doppler: not done  Needle advanced into vessel with real time Ultrasound guidance.  Guidewire confirmed in vessel.  Sterile sheath used.  Number of attempts: 1  Post-procedure: blood return through all ports, chlorhexidine patch and sterile dressing applied    Assessment: successful placement, tip termination and placement verified by x-ray        Name Marques Lane RN   5/1/2023    "

## 2023-05-02 ENCOUNTER — TELEPHONE (OUTPATIENT)
Dept: PHARMACY | Facility: CLINIC | Age: 43
End: 2023-05-02
Payer: COMMERCIAL

## 2023-05-02 LAB
ALBUMIN SERPL BCP-MCNC: 2.4 G/DL (ref 3.5–5.2)
ALP SERPL-CCNC: 63 U/L (ref 55–135)
ALT SERPL W/O P-5'-P-CCNC: 10 U/L (ref 10–44)
ANION GAP SERPL CALC-SCNC: 10 MMOL/L (ref 8–16)
AST SERPL-CCNC: 12 U/L (ref 10–40)
BASOPHILS # BLD AUTO: 0.03 K/UL (ref 0–0.2)
BASOPHILS NFR BLD: 0.3 % (ref 0–1.9)
BILIRUB SERPL-MCNC: 0.6 MG/DL (ref 0.1–1)
BLD PROD TYP BPU: NORMAL
BLD PROD TYP BPU: NORMAL
BLOOD UNIT EXPIRATION DATE: NORMAL
BLOOD UNIT EXPIRATION DATE: NORMAL
BLOOD UNIT TYPE CODE: 7300
BLOOD UNIT TYPE CODE: 7300
BLOOD UNIT TYPE: NORMAL
BLOOD UNIT TYPE: NORMAL
BUN SERPL-MCNC: 7 MG/DL (ref 6–20)
CALCIUM SERPL-MCNC: 8.8 MG/DL (ref 8.7–10.5)
CHLORIDE SERPL-SCNC: 106 MMOL/L (ref 95–110)
CO2 SERPL-SCNC: 20 MMOL/L (ref 23–29)
CODING SYSTEM: NORMAL
CODING SYSTEM: NORMAL
CREAT SERPL-MCNC: 0.7 MG/DL (ref 0.5–1.4)
CROSSMATCH INTERPRETATION: NORMAL
CROSSMATCH INTERPRETATION: NORMAL
DIFFERENTIAL METHOD: ABNORMAL
DISPENSE STATUS: NORMAL
DISPENSE STATUS: NORMAL
EOSINOPHIL # BLD AUTO: 0.1 K/UL (ref 0–0.5)
EOSINOPHIL NFR BLD: 1.5 % (ref 0–8)
ERYTHROCYTE [DISTWIDTH] IN BLOOD BY AUTOMATED COUNT: 13.3 % (ref 11.5–14.5)
EST. GFR  (NO RACE VARIABLE): >60 ML/MIN/1.73 M^2
GLUCOSE SERPL-MCNC: 197 MG/DL (ref 70–110)
HCT VFR BLD AUTO: 31.3 % (ref 37–48.5)
HGB BLD-MCNC: 10.2 G/DL (ref 12–16)
IMM GRANULOCYTES # BLD AUTO: 0.05 K/UL (ref 0–0.04)
IMM GRANULOCYTES NFR BLD AUTO: 0.5 % (ref 0–0.5)
LYMPHOCYTES # BLD AUTO: 1.2 K/UL (ref 1–4.8)
LYMPHOCYTES NFR BLD: 12.2 % (ref 18–48)
MCH RBC QN AUTO: 28.7 PG (ref 27–31)
MCHC RBC AUTO-ENTMCNC: 32.6 G/DL (ref 32–36)
MCV RBC AUTO: 88 FL (ref 82–98)
MONOCYTES # BLD AUTO: 0.7 K/UL (ref 0.3–1)
MONOCYTES NFR BLD: 7 % (ref 4–15)
NEUTROPHILS # BLD AUTO: 7.6 K/UL (ref 1.8–7.7)
NEUTROPHILS NFR BLD: 78.5 % (ref 38–73)
NRBC BLD-RTO: 0 /100 WBC
PLATELET # BLD AUTO: 381 K/UL (ref 150–450)
PMV BLD AUTO: 9.8 FL (ref 9.2–12.9)
POCT GLUCOSE: 203 MG/DL (ref 70–110)
POCT GLUCOSE: 205 MG/DL (ref 70–110)
POCT GLUCOSE: 214 MG/DL (ref 70–110)
POCT GLUCOSE: 251 MG/DL (ref 70–110)
POTASSIUM SERPL-SCNC: 3.6 MMOL/L (ref 3.5–5.1)
PROT SERPL-MCNC: 6.9 G/DL (ref 6–8.4)
RBC # BLD AUTO: 3.55 M/UL (ref 4–5.4)
SODIUM SERPL-SCNC: 136 MMOL/L (ref 136–145)
TRANS ERYTHROCYTES VOL PATIENT: NORMAL ML
TRANS ERYTHROCYTES VOL PATIENT: NORMAL ML
WBC # BLD AUTO: 9.61 K/UL (ref 3.9–12.7)

## 2023-05-02 PROCEDURE — 80053 COMPREHEN METABOLIC PANEL: CPT | Performed by: STUDENT IN AN ORGANIZED HEALTH CARE EDUCATION/TRAINING PROGRAM

## 2023-05-02 PROCEDURE — 97530 THERAPEUTIC ACTIVITIES: CPT | Mod: CQ

## 2023-05-02 PROCEDURE — 21400001 HC TELEMETRY ROOM

## 2023-05-02 PROCEDURE — 94760 N-INVAS EAR/PLS OXIMETRY 1: CPT

## 2023-05-02 PROCEDURE — 25000003 PHARM REV CODE 250: Performed by: STUDENT IN AN ORGANIZED HEALTH CARE EDUCATION/TRAINING PROGRAM

## 2023-05-02 PROCEDURE — 94761 N-INVAS EAR/PLS OXIMETRY MLT: CPT

## 2023-05-02 PROCEDURE — 63600175 PHARM REV CODE 636 W HCPCS: Performed by: STUDENT IN AN ORGANIZED HEALTH CARE EDUCATION/TRAINING PROGRAM

## 2023-05-02 PROCEDURE — A4216 STERILE WATER/SALINE, 10 ML: HCPCS | Performed by: STUDENT IN AN ORGANIZED HEALTH CARE EDUCATION/TRAINING PROGRAM

## 2023-05-02 PROCEDURE — 97116 GAIT TRAINING THERAPY: CPT | Mod: CQ

## 2023-05-02 PROCEDURE — 25000003 PHARM REV CODE 250: Performed by: OBSTETRICS & GYNECOLOGY

## 2023-05-02 PROCEDURE — 25000003 PHARM REV CODE 250

## 2023-05-02 PROCEDURE — 99900035 HC TECH TIME PER 15 MIN (STAT)

## 2023-05-02 PROCEDURE — S0030 INJECTION, METRONIDAZOLE: HCPCS | Performed by: OBSTETRICS & GYNECOLOGY

## 2023-05-02 PROCEDURE — 85025 COMPLETE CBC W/AUTO DIFF WBC: CPT

## 2023-05-02 RX ORDER — INSULIN ASPART 100 [IU]/ML
5 INJECTION, SOLUTION INTRAVENOUS; SUBCUTANEOUS ONCE
Status: COMPLETED | OUTPATIENT
Start: 2023-05-02 | End: 2023-05-02

## 2023-05-02 RX ORDER — DOXYCYCLINE HYCLATE 100 MG
100 TABLET ORAL EVERY 12 HOURS
Status: DISCONTINUED | OUTPATIENT
Start: 2023-05-02 | End: 2023-05-03 | Stop reason: HOSPADM

## 2023-05-02 RX ORDER — LOSARTAN POTASSIUM 25 MG/1
50 TABLET ORAL DAILY
Qty: 90 TABLET | Refills: 6 | Status: SHIPPED | OUTPATIENT
Start: 2023-05-02 | End: 2024-02-07 | Stop reason: SDUPTHER

## 2023-05-02 RX ORDER — METRONIDAZOLE 500 MG/1
500 TABLET ORAL EVERY 8 HOURS
Status: DISCONTINUED | OUTPATIENT
Start: 2023-05-02 | End: 2023-05-03 | Stop reason: HOSPADM

## 2023-05-02 RX ORDER — METRONIDAZOLE 500 MG/1
500 TABLET ORAL EVERY 12 HOURS
Qty: 28 TABLET | Refills: 1 | Status: SHIPPED | OUTPATIENT
Start: 2023-05-02 | End: 2023-05-18 | Stop reason: SDUPTHER

## 2023-05-02 RX ORDER — DOXYCYCLINE 100 MG/1
100 CAPSULE ORAL 2 TIMES DAILY
Qty: 28 CAPSULE | Refills: 1 | Status: SHIPPED | OUTPATIENT
Start: 2023-05-02 | End: 2023-05-18 | Stop reason: SDUPTHER

## 2023-05-02 RX ADMIN — INSULIN ASPART 4 UNITS: 100 INJECTION, SOLUTION INTRAVENOUS; SUBCUTANEOUS at 05:05

## 2023-05-02 RX ADMIN — MUPIROCIN: 20 OINTMENT TOPICAL at 08:05

## 2023-05-02 RX ADMIN — METRONIDAZOLE 500 MG: 5 INJECTION, SOLUTION INTRAVENOUS at 03:05

## 2023-05-02 RX ADMIN — SODIUM CHLORIDE, PRESERVATIVE FREE 10 ML: 5 INJECTION INTRAVENOUS at 06:05

## 2023-05-02 RX ADMIN — AMLODIPINE BESYLATE 5 MG: 5 TABLET ORAL at 08:05

## 2023-05-02 RX ADMIN — CHLORHEXIDINE GLUCONATE 0.12% ORAL RINSE 15 ML: 1.2 LIQUID ORAL at 09:05

## 2023-05-02 RX ADMIN — OXYCODONE HYDROCHLORIDE AND ACETAMINOPHEN 1 TABLET: 10; 325 TABLET ORAL at 05:05

## 2023-05-02 RX ADMIN — DOXYCYCLINE HYCLATE 100 MG: 100 TABLET, COATED ORAL at 08:05

## 2023-05-02 RX ADMIN — SIMETHICONE 125 MG: 125 TABLET, CHEWABLE ORAL at 11:05

## 2023-05-02 RX ADMIN — DOXYCYCLINE HYCLATE 100 MG: 100 TABLET, COATED ORAL at 09:05

## 2023-05-02 RX ADMIN — FAMOTIDINE 20 MG: 20 TABLET, FILM COATED ORAL at 08:05

## 2023-05-02 RX ADMIN — INSULIN ASPART 5 UNITS: 100 INJECTION, SOLUTION INTRAVENOUS; SUBCUTANEOUS at 12:05

## 2023-05-02 RX ADMIN — METRONIDAZOLE 500 MG: 500 TABLET ORAL at 02:05

## 2023-05-02 RX ADMIN — METRONIDAZOLE 500 MG: 500 TABLET ORAL at 09:05

## 2023-05-02 RX ADMIN — OXYCODONE HYDROCHLORIDE AND ACETAMINOPHEN 1 TABLET: 10; 325 TABLET ORAL at 09:05

## 2023-05-02 RX ADMIN — CHLORHEXIDINE GLUCONATE 0.12% ORAL RINSE 15 ML: 1.2 LIQUID ORAL at 08:05

## 2023-05-02 RX ADMIN — OXYCODONE HYDROCHLORIDE AND ACETAMINOPHEN 1 TABLET: 10; 325 TABLET ORAL at 11:05

## 2023-05-02 RX ADMIN — METOCLOPRAMIDE 10 MG: 10 TABLET ORAL at 02:05

## 2023-05-02 RX ADMIN — INSULIN ASPART 4 UNITS: 100 INJECTION, SOLUTION INTRAVENOUS; SUBCUTANEOUS at 11:05

## 2023-05-02 RX ADMIN — INSULIN ASPART 6 UNITS: 100 INJECTION, SOLUTION INTRAVENOUS; SUBCUTANEOUS at 04:05

## 2023-05-02 RX ADMIN — LOSARTAN POTASSIUM 50 MG: 50 TABLET, FILM COATED ORAL at 08:05

## 2023-05-02 RX ADMIN — SIMETHICONE 125 MG: 125 TABLET, CHEWABLE ORAL at 05:05

## 2023-05-02 RX ADMIN — OXYCODONE HYDROCHLORIDE AND ACETAMINOPHEN 1 TABLET: 10; 325 TABLET ORAL at 04:05

## 2023-05-02 RX ADMIN — SODIUM CHLORIDE, PRESERVATIVE FREE 10 ML: 5 INJECTION INTRAVENOUS at 12:05

## 2023-05-02 RX ADMIN — MUPIROCIN: 20 OINTMENT TOPICAL at 09:05

## 2023-05-02 RX ADMIN — INSULIN ASPART 2 UNITS: 100 INJECTION, SOLUTION INTRAVENOUS; SUBCUTANEOUS at 09:05

## 2023-05-02 RX ADMIN — METOCLOPRAMIDE 10 MG: 10 TABLET ORAL at 09:05

## 2023-05-02 RX ADMIN — SODIUM CHLORIDE, PRESERVATIVE FREE 10 ML: 5 INJECTION INTRAVENOUS at 05:05

## 2023-05-02 RX ADMIN — METOCLOPRAMIDE 10 MG: 10 TABLET ORAL at 08:05

## 2023-05-02 RX ADMIN — CEFTRIAXONE 1 G: 1 INJECTION, POWDER, FOR SOLUTION INTRAMUSCULAR; INTRAVENOUS at 02:05

## 2023-05-02 RX ADMIN — MORPHINE SULFATE 2 MG: 2 INJECTION, SOLUTION INTRAMUSCULAR; INTRAVENOUS at 01:05

## 2023-05-02 NOTE — PLAN OF CARE
Problem: Physical Therapy  Goal: Physical Therapy Goal  Description: Goals to be met by: 2023     Patient will increase functional independence with mobility by performin. Supine to sit with Modified [place]  2. Sit to supine with Modified [place]  3. Sit to stand transfer with Modified [place]  4. Gait  x 250 feet with Modified [place] using Rolling Walker.     Outcome: Ongoing, Progressing

## 2023-05-02 NOTE — PLAN OF CARE
Pt is AAOx4. Pt given medications as ordered per MAR. IV antibiotics given as scheduled. Pt tolerating diabetic diet. PRN Percocet given for pain. R groin dressing with gauze and tegaderm CDI. FLORY PICC CDI. No c/o n/v or SOB. Up to BSC with 1 person assist. Safety maintained. Bed alarm set. Instructed to use call light for assistance. Will continue to monitor.         Problem: Adult Inpatient Plan of Care  Goal: Optimal Comfort and Wellbeing  Outcome: Ongoing, Progressing     Problem: Pain Acute  Goal: Acceptable Pain Control and Functional Ability  Outcome: Ongoing, Progressing

## 2023-05-02 NOTE — PT/OT/SLP PROGRESS
Physical Therapy Treatment    Patient Name:  Chelsi Johnson   MRN:  [id number]    Recommendations:     Discharge Recommendations: home health PT  Discharge Equipment Recommendations: walker, rolling  Barriers to discharge:  decreased mobility,strength and endurance    Assessment:     Chelsi Johnson is a 43 y.o. female admitted with a medical diagnosis of Tubo-ovarian abscess.  She presents with the following impairments/functional limitations: weakness, impaired endurance, impaired functional mobility, gait instability, impaired balance, decreased lower extremity function, decreased ROM, pain, impaired skin,pt with improving status and increased endurance,pt will benefit from  services upon discharge.    Rehab Prognosis: Good; patient would benefit from acute skilled PT services to address these deficits and reach maximum level of function.    Recent Surgery: * No surgery found *      Plan:     During this hospitalization, patient to be seen 4 x/week to address the identified rehab impairments via gait training, therapeutic activities, therapeutic exercises, neuromuscular re-education and progress toward the following goals:    Plan of Care Expires:  05/30/23    Subjective     Chief Complaint: n/a  Patient/Family Comments/goals: pt agreeable to rx.  Pain/Comfort:  Pain Rating 1:  (no c/o's)      Objective:     Communicated with nsg prior to session.  Patient found supine with peripheral IV, telemetry upon PT entry to room.     General Precautions: Standard, fall  Orthopedic Precautions: N/A  Braces: N/A  Respiratory Status: Room air     Functional Mobility:  Bed Mobility:     Supine to Sit: modified independence  Transfers:     Sit to Stand:  supervision with rolling walker  Bed to Chair: supervision with  rolling walker  using  ambulation  Gait: amb ~80' X 2 with RW and S X 1 standing rest  Balance: fair standing balance with RW      AM-PAC 6 CLICK MOBILITY  Turning over in bed (including adjusting bedclothes,  sheets and blankets)?: 4  Sitting down on and standing up from a chair with arms (e.g., wheelchair, bedside commode, etc.): 3  Moving from lying on back to sitting on the side of the bed?: 3  Moving to and from a bed to a chair (including a wheelchair)?: 3  Need to walk in hospital room?: 3  Climbing 3-5 steps with a railing?: 3  Basic Mobility Total Score: 19       Treatment & Education: pt used bathroom during rx.      Patient left up in chair with all lines intact, call button in reach, and pct present..    GOALS: see general POC  Multidisciplinary Problems       Physical Therapy Goals          Problem: Physical Therapy    Goal Priority Disciplines Outcome Goal Variances Interventions   Physical Therapy Goal     PT, PT/OT Ongoing, Progressing     Description: Goals to be met by: 2023     Patient will increase functional independence with mobility by performin. Supine to sit with Modified [place]  2. Sit to supine with Modified [place]  3. Sit to stand transfer with Modified [place]  4. Gait  x 250 feet with Modified [place] using Rolling Walker.                          Time Tracking:     PT Received On: 23  PT Start Time: 857     PT Stop Time: 927  PT Total Time (min): 30 min     Billable Minutes: Gait Training 17 and Therapeutic Activity 13    Treatment Type: Treatment  PT/PTA: PTA     Number of PTA visits since last PT visit: 2     2023

## 2023-05-02 NOTE — TELEPHONE ENCOUNTER
Hello,     The prior authorization for Chelsi Alex's  ORIAHANN prescription has been APPROVED FROM 05/02/2023 TO 05/02/2024 with copayment of $5.01.       [facility] @ [phone] will reach out to patient for further correspondence.       If there are any additional questions or concerns, please contact me.    Sincerely,  Cynthia Aguirre  Prior Authorization Department  [facility]  [phone]

## 2023-05-02 NOTE — PROGRESS NOTES
GYNECOLOGY PROGRESS NOTE  5/1/2023     Admission dx Tuboovarian abscess,pelvic hematoma     Interval History: Pt went for IR drainage of TOA. Procedure unsuccessful and complicated by bleed of R iliac circumflex artery, successfully embolized. Pt remained intubated and was transferred to ICU for acute resuscitation and blood transfusion. Initial Hgb post-procedure was 3.9, however blood sample noted to be diluted. Pt received 2U pRBCs with improvement in Hgb to 12.6. Patient was extubated and stabilized in ICU and transferred to Med/Surg floor in stable condition.     Today, patient has now gone over 24 hours without fever. Patient still with continued RLQ pain. Patient states that this AM she did note 1x episode of vaginal bleeding. Hemoglobin stable 10.2 this AM. Patient willing to work with PT today.    Allergies: Review of patient's allergies indicates:  No Known Allergies          Past Medical History:   Diagnosis Date    Diabetes mellitus      Hypertension                 Past Surgical History:   Procedure Laterality Date    BREAST LUMPECTOMY Right      TUBAL LIGATION             MEDS:   No current facility-administered medications on file prior to encounter.             Current Outpatient Medications on File Prior to Encounter   Medication Sig Dispense Refill    ALBUTEROL INHL Inhale 2 puffs into the lungs as needed.        losartan (COZAAR) 25 MG tablet Take 25 mg by mouth once daily.        metFORMIN (GLUCOPHAGE) 1000 MG tablet Take 1,000 mg by mouth 2 (two) times daily with meals.             OB History    No obstetric history on file.            Social History               Socioeconomic History    Marital status:    Tobacco Use    Smoking status: Never       Passive exposure: Never    Smokeless tobacco: Never   Substance and Sexual Activity    Alcohol use: Never    Drug use: Never    Sexual activity: Yes       Partners: Male            History reviewed. No pertinent family history.      Vitals:  Vitals          Vitals:     04/29/23 0715 04/29/23 0730 04/29/23 0745 04/29/23 0828   BP: (!) 153/73 (!) 154/71  Comment: Simultaneous filing. User may not have seen previous data. (!) 157/72 (!) 155/70   BP Location:     Right arm     Patient Position:     Lying     Pulse: 105 105  Comment: Simultaneous filing. User may not have seen previous data. 105     Resp: (!) 24 (!) 24  Comment: Simultaneous filing. User may not have seen previous data. (!) 25 (!) 25   Temp:     99.5 °F (37.5 °C)     TempSrc:     Axillary     SpO2: 99% 100%  Comment: Simultaneous filing. User may not have seen previous data. 100%     Weight:           Height:                       Physical Exam:   General: NAD, WNWD  CV: RRR, S1 and S2 present  Pulm: CTAB, limited air movement due to discomfort from abd distention  ABD: tender upon palpation to RLQ, no apparent guarding, normal bowel sounds throughout  Ext: No edema, scd's in place     Lab Results   Component Value Date    WBC 9.61 05/02/2023    HGB 10.2 (L) 05/02/2023    HCT 31.3 (L) 05/02/2023    MCV 88 05/02/2023     05/02/2023      CMP  Sodium   Date Value Ref Range Status   05/02/2023 136 136 - 145 mmol/L Final     Potassium   Date Value Ref Range Status   05/02/2023 3.6 3.5 - 5.1 mmol/L Final     Chloride   Date Value Ref Range Status   05/02/2023 106 95 - 110 mmol/L Final     CO2   Date Value Ref Range Status   05/02/2023 20 (L) 23 - 29 mmol/L Final     Glucose   Date Value Ref Range Status   05/02/2023 197 (H) 70 - 110 mg/dL Final     BUN   Date Value Ref Range Status   05/02/2023 7 6 - 20 mg/dL Final     Creatinine   Date Value Ref Range Status   05/02/2023 0.7 0.5 - 1.4 mg/dL Final     Calcium   Date Value Ref Range Status   05/02/2023 8.8 8.7 - 10.5 mg/dL Final     Total Protein   Date Value Ref Range Status   05/02/2023 6.9 6.0 - 8.4 g/dL Final     Albumin   Date Value Ref Range Status   05/02/2023 2.4 (L) 3.5 - 5.2 g/dL Final     Total Bilirubin   Date Value  Ref Range Status   05/02/2023 0.6 0.1 - 1.0 mg/dL Final     Comment:     For infants and newborns, interpretation of results should be based  on gestational age, weight and in agreement with clinical  observations.    Premature Infant recommended reference ranges:  Up to 24 hours.............<8.0 mg/dL  Up to 48 hours............<12.0 mg/dL  3-5 days..................<15.0 mg/dL  6-29 days.................<15.0 mg/dL       Alkaline Phosphatase   Date Value Ref Range Status   05/02/2023 63 55 - 135 U/L Final     AST   Date Value Ref Range Status   05/02/2023 12 10 - 40 U/L Final     ALT   Date Value Ref Range Status   05/02/2023 10 10 - 44 U/L Final     Anion Gap   Date Value Ref Range Status   05/02/2023 10 8 - 16 mmol/L Final     eGFR   Date Value Ref Range Status   05/02/2023 >60 >60 mL/min/1.73 m^2 Final     CTA ABDOMEN AND PELVIS     TECHNIQUE:  Using 75 cc of  Omnipaque 350 IV, and multi-detector helical CT technique, axial CT angiogram images of the abdomen were obtained from the lung bases through the pelvis. Precontrast and portal venous phase images of the abdomen and pelvis also done. 2D post-processing coronal and sagittal reconstructions of the abdominal aorta and visceral arteries performed.     COMPARISON:  CT, 04/28/2023 at 16:24 hours     FINDINGS:  There is a large retroperitoneal hematoma on the right extending from the tip of the liver is through the lateral conal fascia is to just proximal to the inguinal ligament.  No groin hematoma is evident.     Following contrast administration there is active extravasation in the central portion of the hematoma suggesting active ooze is.     No other extravasation is seen elsewhere.  Heterogeneity and enlarged uterus with a tubular structure in the right adnexa again noted compatible with hydro pyosalpinx.     The lung bases are well aerated.  No consolidation, suspicious nodules, or pleural effusion.  The visualized portions of the heart appear normal.      The esophagus, stomach, spleen, pancreas, and adrenal glands are unremarkable.     The liver is normal in size and attenuation without focal abnormality.  The portal veins appear patent.  The gallbladder shows no evidence of stones or cholecystitis.  No intra-or extrahepatic biliary ductal dilatation.     The kidneys demonstrate normal enhancement.  No renal mass, nephrolithiasis or hydronephrosis.  The ureters are normal in course and caliber. The urinary bladder appears unremarkable     No evidence of gastrointestinal hemorrhage, bowel obstruction, or inflammation.  There is no ascites, free fluid, or intraperitoneal free air. No significant peritoneal or retroperitoneal adenopathy.     The abdominal aorta is normal in course and caliber without significant atherosclerotic calcifications.     The osseous structures and extraperitoneal soft tissues are unremarkable.     Impression:     Retroperitoneal hematoma in the lateral conal fascia of the right iliac fossa abdomen with active extravasation.  No significant increase in size since the earlier CT.     Persistent enlarged heterogeneous multi fibroid uterus and tubular fluid-filled structure in the adnexa on the right compatible with hydro pyosalpinx.     No other acute finding evident within the abdomen or pelvis.        Electronically signed by: Austin Byers  Date:                                            04/28/2023  Time:                                           23:57  ---------------------------------------------------------------------------------------------  AP View(s) of the abdomen was performed.     COMPARISON:  None     FINDINGS:  Two views abdomen supine.     Air and stool is seen within the large bowel and projected over the rectum.  There are several scattered prominent air-filled small bowel loops throughout the abdomen and pelvis.  No findings to suggest pneumatosis.  There are no calcifications to suggest nephrolithiasis or cholelithiasis.   The osseous structures are intact.  There is coarse interstitial attenuation of the visualized pulmonary parenchyma.     Impression:     1. Slow flow through the small bowel, could reflect developing ileus, correlation and follow-up is advised.        Electronically signed by: Kobe Pugh MD  Date:                                            04/29/2023  Time:                                           14:46      Assessment:  Chelsi Johnson is a 43 y.o. female with PMH T2DM, HTN who was admitted for tubo-ovarian abscess. Went for IR drainage of TOA on 4/28/23 complicated by bleed of R iliac circumflex artery, successfully embolized. TOA unable to be accessed. Pt then required transfer to ICU intubated for continued resuscitation and management after arterial bleed. Received 2U pRBCs with resolution of acute blood loss anemia.      Plan:     #TOA  - Daily CBC, WBC improving  - Blood cx x 2 NGTD  - Will attempt to transition to oral antibiotics today (previously on IV rocephin/doxy/flagyl). Patient without leukocytosis today  -  will defer any further intervention at this time given complication with initial procedure.   - If TOA persistent or no improvement with IV antibiotics, may need diagnostic Lap possible salpingectomy and possible oophorectomy. And evacuation of pelvic hematoma. However needs more time on antibiotics      #Pelvic arterial bleed  - Complication of attempt at TOA drainage 4/29/23. Successfully embolized. S/p 2U pRBCs 4/29.   - Patient with noted dark maroon bowel movement this AM. Will hold patient's scheduled NSAIDS  - Will follow IR recs for re- imaging if needed     #T2DM  - Hold home metformin while inpatient   - Hgb A1C 10%, will need follow-up with PCP for further management outpatient  - Daily CMP  - MDSSI and levemir 16u BID with goal glucose 140-180  Medicine consults placed for dm mgmt inpt, will follow there recs in optimizing BS control     #HTN  - Losartan initially held due to  hypotension from acute blood loss however this has resolved.  - will follow medicine recs    Ulcer PPX: pepcid   DVT PPX: SCDs in place, continue to work with PT/OT    Diet: diabetic diet  Dispo: Home pending clinical improvement and AF x 48

## 2023-05-02 NOTE — NURSING
RAPID RESPONSE NURSE PROACTIVE ROUNDING NOTE       Time of Visit: 0840      Admit Date: 2023  LOS: 4  Code Status: Full Code   Date of Visit: 2023  : 1980  Age: 43 y.o.  Sex: female  Race: Black or   Bed: K522/K522 A:   MRN: [id number]  Was the patient discharged from an ICU this admission? Yes   Was the patient discharged from a PACU within last 24 hours? No   Did the patient receive conscious sedation/general anesthesia in last 24 hours? No   Was the patient in the ED within the past 24 hours? No   Was the patient on NIPPV within the past 24 hours? No   Attending Physician: Cirilo Sampson MD  Primary Service: Obstetrics and Gynecology   Time spent at the bedside: < 15 min    SITUATION    Notified by Epic patient alert  Reason for alert: Proactive Round    Diagnosis: Tubo-ovarian abscess   has a past medical history of Diabetes mellitus and Hypertension.    Last Vitals:  Temp: 97.7 °F (36.5 °C) (733)  Pulse: 104 (733)  Resp: 18 (733)  BP: 136/82 (733)  SpO2: 97 % ( 0755)    24 Hour Vitals Range:  Temp:  [97.3 °F (36.3 °C)-98.6 °F (37 °C)]   Pulse:  [1-105]   Resp:  [16-20]   BP: (119-136)/(76-83)   SpO2:  [97 %-99 %]     Clinical Issues: Circulatory          Discussed plan of care with bedside RN,      PROVIDER ESCALATION    Physician escalation: No    Orders received and case discussed with NA.    Disposition:Remain in room      FOLLOW UP    Call back the Rapid Response Nursedevin at [phone] for additional questions or concerns.

## 2023-05-02 NOTE — PROGRESS NOTES
Consult note  [facility]    Consulted by: Cirilo Sampson MD  Reason for Consult: T2DM and HTN    History of Present Illness: Patient is a 43 y.o. female with a PMHx T2DM, HTN here for bilateral tubo-ovarian abscesses. IR attempted drainage of the right tubo-ovarian ovarian abscess but was complicated by extravasation of the right circumflex artery resulting in formation of large hematoma which required emergent embolization. Patient extubated on 04/28/2023 and remains admitted within the ICU for closer monitoring. Patient is receiving IV antibiotics for her TOA including ceftriaxone, doxycycline, and metronidazole. [facility] has been consulted for management of patient's type 2 diabetes and hypertension.    Patient endorses she is currently taking losartan 25 mg daily and metformin 1000 mg daily at home. Hemoglobin A1c on admission was 10% and patient has maintained BG levels in the upper 200s requiring detemir and SSI. Patient currently on detemir 15 units nightly plus SSI.    Interval Hx:  Pt has ongoing RLQ pain, but improving. BP improving. Denies N/V.    PTA Medications   Medication Sig    ALBUTEROL INHL Inhale 2 puffs into the lungs as needed.    losartan (COZAAR) 25 MG tablet Take 25 mg by mouth once daily.    metFORMIN (GLUCOPHAGE) 1000 MG tablet Take 1,000 mg by mouth 2 (two) times daily with meals.       Review of patient's allergies indicates:  No Known Allergies    Past Medical History:   Diagnosis Date    Diabetes mellitus     Hypertension      Past Surgical History:   Procedure Laterality Date    BREAST LUMPECTOMY Right     TUBAL LIGATION       History reviewed. No pertinent family history.  Social History     Tobacco Use    Smoking status: Never     Passive exposure: Never    Smokeless tobacco: Never   Substance Use Topics    Alcohol use: Never    Drug use: Never       Review of Systems:   Review of Systems   Constitutional:  Negative for fever.   Respiratory:  Negative for  shortness of breath.    Cardiovascular:  Negative for chest pain.   Gastrointestinal:  Positive for abdominal pain. Negative for nausea and vomiting.   Musculoskeletal:  Negative for back pain.   Neurological:  Negative for headaches.      OBJECTIVE:     Vital Signs (Most Recent)  Temp: 97.7 °F (36.5 °C) (05/02/23 0733)  Pulse: 104 (05/02/23 0733)  Resp: 18 (05/02/23 0733)  BP: 136/82 (05/02/23 0733)  SpO2: 97 % (05/02/23 0733)    Physical Exam:  Gen - WNWD, NAD  CV - tachycardic with normal sinus rhythm, no LE edema  Resp - breathing comfortably on RA, CTAB  Abd - diffuse tenderness but maximal at RLQ soft (improving), distended abdomen, BS normal, no rebound tenderness or guarding  Psych - logical thought process, answers questions appropriately     Laboratory  Lab Results   Component Value Date    WBC 9.61 05/02/2023    HGB 10.2 (L) 05/02/2023    HCT 31.3 (L) 05/02/2023    MCV 88 05/02/2023     05/02/2023      CMP  Sodium   Date Value Ref Range Status   05/02/2023 136 136 - 145 mmol/L Final     Potassium   Date Value Ref Range Status   05/02/2023 3.6 3.5 - 5.1 mmol/L Final     Chloride   Date Value Ref Range Status   05/02/2023 106 95 - 110 mmol/L Final     CO2   Date Value Ref Range Status   05/02/2023 20 (L) 23 - 29 mmol/L Final     Glucose   Date Value Ref Range Status   05/02/2023 197 (H) 70 - 110 mg/dL Final     BUN   Date Value Ref Range Status   05/02/2023 7 6 - 20 mg/dL Final     Creatinine   Date Value Ref Range Status   05/02/2023 0.7 0.5 - 1.4 mg/dL Final     Calcium   Date Value Ref Range Status   05/02/2023 8.8 8.7 - 10.5 mg/dL Final     Total Protein   Date Value Ref Range Status   05/02/2023 6.9 6.0 - 8.4 g/dL Final     Albumin   Date Value Ref Range Status   05/02/2023 2.4 (L) 3.5 - 5.2 g/dL Final     Total Bilirubin   Date Value Ref Range Status   05/02/2023 0.6 0.1 - 1.0 mg/dL Final     Comment:     For infants and newborns, interpretation of results should be based  on gestational age,  weight and in agreement with clinical  observations.    Premature Infant recommended reference ranges:  Up to 24 hours.............<8.0 mg/dL  Up to 48 hours............<12.0 mg/dL  3-5 days..................<15.0 mg/dL  6-29 days.................<15.0 mg/dL       Alkaline Phosphatase   Date Value Ref Range Status   05/02/2023 63 55 - 135 U/L Final     AST   Date Value Ref Range Status   05/02/2023 12 10 - 40 U/L Final     ALT   Date Value Ref Range Status   05/02/2023 10 10 - 44 U/L Final     Anion Gap   Date Value Ref Range Status   05/02/2023 10 8 - 16 mmol/L Final        Lab Results   Component Value Date    INR 1.1 04/29/2023    INR 1.6 (H) 04/28/2023      No results for input(s): CPK, CPKMB, TROPONINI, MB in the last 168 hours.   No results for input(s): TROPONINI, CKTOTAL, CKMB in the last 168 hours.    BNP  No results for input(s): BNP in the last 168 hours.    Urinalysis  No results for input(s): COLORU, CLARITYU, SPECGRAV, PHUR, PROTEINUA, GLUCOSEU, BILIRUBINCON, BLOODU, WBCU, RBCU, BACTERIA, MUCUS, NITRITE, LEUKOCYTESUR, UROBILINOGEN, HYALINECASTS in the last 24 hours.   LAST HbA1c  Lab Results   Component Value Date    HGBA1C 10.0 (H) 04/28/2023       Diagnostic Results:  Labs: Reviewed  X-Ray: Reviewed    Imaging Results              MRI Abdomen-Pelvis w w/o Contrast (XPD) (Final result)  Result time 04/28/23 08:27:21      Final result by Chencho Joseph DO (04/28/23 08:27:21)                   Impression:      1. Inflamed, distended, fluid-filled tubular structure in the right adnexa/right lower quadrant, favored to represent a distended fallopian tube with hydrosalpinx/pyosalpinx.  Acute appendicitis is felt less likely.  2. Smaller distended tubular structure in the left adnexa, also concerning for hydrosalpinx/pyosalpinx.  3. Large fibroid uterus.  4. Hepatomegaly and hepatic steatosis.      Electronically signed by: Chencho Joseph  Date:    04/28/2023  Time:    08:27               Narrative:     EXAMINATION:  MRI ABDOMEN-PELVIS W W/O CONTRAST (XPD)    CLINICAL HISTORY:  RLQ abdominal pain (Age >= 14y);    TECHNIQUE:  Multisequence, multiplanar MRI of the abdomen performed per liver protocol before and after administration of 10 mL Gadavist intravenous contrast.    COMPARISON:  CT of the abdomen and pelvis from 04/27/2023.    FINDINGS:  Lung bases: The lung bases are grossly clear.    Liver: The liver is enlarged and demonstrates diffuse loss of signal on out of phase imaging compatible with hepatic steatosis.  There are no focal hepatic lesions.    Biliary: No intrahepatic or extrahepatic biliary dilatation.    Gallbladder: The gallbladder is unremarkable without evidence of cholelithiasis, gallbladder wall thickening, or pericholecystic fluid.    Pancreas: Unremarkable.  No pancreatic ductal dilatation.    Spleen: Normal size.  No focal lesions.    Adrenal glands: Unremarkable.    Kidneys: No renal masses.  No hydronephrosis.    GI: Visualized bowel loops are unremarkable.    Pelvis: There is an inflamed dilated tubular structure in the right adnexa/right lower quadrant measuring up to approximately 2.3 cm in maximal diameter, with adjacent surrounding edema and fat stranding.  The tubular structure does not definitively arise from the cecum and is favored to represent a distended fallopian tube with hydrosalpinx/pyosalpinx.  The appendix is not definitively seen separate from this structure.  There is an additional smaller tubular structure in the left adnexa with a diameter of approximately 1.1 cm, without significant surrounding edema.    Reproductive: There are multiple large uterine fibroids.  There is trace free fluid in the pelvis.    Mesentery: No ascites. No focal fluid collection. No free air.    Body wall: Unremarkable.    Musculoskeletal: Marrow signal is unremarkable.                                       CT Abdomen Pelvis With Contrast (Final result)  Result time 04/27/23 20:39:21      Final  result by Lobo Tobias MD (04/27/23 20:39:21)                   Impression:      Enlarged multi fibroid uterus.  Recommend clinical correlation for fibroid degeneration.  Recommend follow-up MRI with contrast for further evaluation on a nonemergent basis    Fatty infiltration liver with hepatomegaly    There is a dilated cystic structure with peripheral enhancement in the right lower quadrant measuring up to 24 mm in diameter may relate to an inflamed appendix.  Differential diagnosis includes dilated right-sided fallopian tubes which is not well delineated.  Additional consideration includes oral contrast administration and repeat CT.    All CT scans at this facility use dose modulation, iterative reconstruction, and/or weight based dosing when appropriate to reduce radiation dose to as low as reasonably achievable.      Electronically signed by: Lobo Tobias  Date:    04/27/2023  Time:    20:39               Narrative:    EXAMINATION:  CT ABDOMEN PELVIS WITH CONTRAST    CLINICAL HISTORY:  RLQ abdominal pain (Age >= 14y);    TECHNIQUE:  Low dose axial images, sagittal and coronal reformations were obtained from the lung bases to the pubic symphysis following the IV administration of 100 mL of Omnipaque 350.    COMPARISON:  None    FINDINGS:  Heart: Normal size as far as seen. No effusion as far as seen.    Lung Bases: Clear.    Liver: Fatty infiltration of liver with hepatomegaly..  No focal lesions.    Gallbladder: No calcified gallstones.    Bile Ducts: No dilatation.    Pancreas: No mass. No peripancreatic fat stranding.    Spleen: Normal.    Adrenals: Normal.    Kidneys/Ureters: Normal enhancement.  No mass or  hydroureteronephrosis.    Bladder: No wall thickening.    Reproductive organs: Heterogeneous clean hand sing enlarged multi fibroid uterus.    GI Tract/Mesentery: No evidence of bowel obstruction or inflammation.    There is a dilated cystic structure with peripheral enhancement in the right lower  quadrant measuring up to 24 mm in diameter may relate to an inflamed appendix. Differential diagnosis includes dilated right-sided fallopian tubes which is not well delineated. Additional consideration includes oral contrast administration and repeat CT.    Peritoneal Space: No ascites or free air.    Retroperitoneum: No significant adenopathy.    Abdominal wall: Normal.    Vasculature: No aneurysm.    Bones: No acute fracture. No suspicious lytic or sclerotic lesions.                                      ASSESSMENT/PLAN:   43 y.o.female has a past medical history of Diabetes mellitus and Hypertension. here for tubo-ovarian abscess complicated by extravasation of the right circumflex artery resulting in formation of large hematoma requiring emergent embolization. S/p extubation on 04/28/2023. Family Medicine consulted for inpatient management of HTN and DM. Patient currently hemodynamically stable on the floor.      #TOA  # Pelvic arterial bleed  - Management per Primary Team - OBGYN    #Essential Hypertension   - /78 this AM, improving  - Home medications include losartan 25 mg daily.  Within the past 24 hours, patient with improved BP reading showing systolics in the 130-140s and diastolics in the 60-80s.   - Continue losartan 50 mg daily  - On amlodipine 5 mg  - Can use IV hydralazine or labetolol PRN for SYS > 180  - Can consider additional hypertensive agents if BP uncontrolled     #T2DM  - HbA1c 10% on admission  - Initially on insulin detemir 15 units nightly + SSI --> 12 units BID --> 16 units BID  -  this AM  - Continue to titrate as needed to a blood glucose goal of 140-180 while inpatient     Family medicine will continue to follow. Please contact us if you have any further questions. Thank you for the consult.      Yoana Ribeiro MD, MPH  [facility] PGY-1  05/02/2023

## 2023-05-02 NOTE — NURSING
"   NURSE PROACTIVE ROUNDING NOTE     Admit Date: 2023  LOS: 4  Code Status: Full Code   Date of Visit: 2023  : 1980  Age: 43 y.o.  Sex: female  Race: Black or   Bed: K522/K522 A:   MRN: [id number]  Was the patient discharged from an ICU this admission? Yes  Was the patient discharged from a PACU within last 24 hours?  No  Did the patient receive conscious sedation/general anesthesia in last 24 hours?  No  Was the patient in the ED within the past 24 hours?  No  Was the patient started on NIPPV within the past 24 hours?  No  Attending Physician: Cirilo Sampson MD  Primary Service: Networked reference to record PCT     ASSESSMENT   Diagnosis: Tubo-ovarian abscess    Abnormal Vital Signs: /83 (BP Location: Left arm, Patient Position: Sitting)   Pulse 100   Temp 98.3 °F (36.8 °C) (Oral)   Resp 20   Ht 5' 4" (1.626 m)   Wt 110.5 kg (243 lb 9.7 oz)   LMP 2023   SpO2 98%   Breastfeeding No   BMI 41.82 kg/m²      Patient  has a past medical history of Diabetes mellitus and Hypertension.    Chart reviewed; trending CBCs daily. VSS. No intervention required at this time.     FOLLOW-UP     Call back the Rapid Response Nurse, Opal Mendoza RN at [phone] for additional questions or concerns.         "

## 2023-05-03 ENCOUNTER — TELEPHONE (OUTPATIENT)
Dept: OBSTETRICS AND GYNECOLOGY | Facility: CLINIC | Age: 43
End: 2023-05-03
Payer: COMMERCIAL

## 2023-05-03 VITALS
SYSTOLIC BLOOD PRESSURE: 133 MMHG | HEART RATE: 105 BPM | RESPIRATION RATE: 18 BRPM | DIASTOLIC BLOOD PRESSURE: 83 MMHG | OXYGEN SATURATION: 96 % | WEIGHT: 241.63 LBS | HEIGHT: 64 IN | BODY MASS INDEX: 41.25 KG/M2 | TEMPERATURE: 98 F

## 2023-05-03 DIAGNOSIS — N70.93 TOA (TUBO-OVARIAN ABSCESS): Primary | ICD-10-CM

## 2023-05-03 LAB
POCT GLUCOSE: 205 MG/DL (ref 70–110)
POCT GLUCOSE: 260 MG/DL (ref 70–110)

## 2023-05-03 PROCEDURE — 25000003 PHARM REV CODE 250: Performed by: STUDENT IN AN ORGANIZED HEALTH CARE EDUCATION/TRAINING PROGRAM

## 2023-05-03 PROCEDURE — A4216 STERILE WATER/SALINE, 10 ML: HCPCS | Performed by: STUDENT IN AN ORGANIZED HEALTH CARE EDUCATION/TRAINING PROGRAM

## 2023-05-03 PROCEDURE — 99900035 HC TECH TIME PER 15 MIN (STAT)

## 2023-05-03 PROCEDURE — 94761 N-INVAS EAR/PLS OXIMETRY MLT: CPT

## 2023-05-03 PROCEDURE — 25000003 PHARM REV CODE 250

## 2023-05-03 PROCEDURE — 97530 THERAPEUTIC ACTIVITIES: CPT | Mod: CQ

## 2023-05-03 PROCEDURE — 25000003 PHARM REV CODE 250: Performed by: OBSTETRICS & GYNECOLOGY

## 2023-05-03 PROCEDURE — 99232 PR SUBSEQUENT HOSPITAL CARE,LEVL II: ICD-10-PCS | Mod: ,,, | Performed by: OBSTETRICS & GYNECOLOGY

## 2023-05-03 PROCEDURE — 99232 SBSQ HOSP IP/OBS MODERATE 35: CPT | Mod: ,,, | Performed by: OBSTETRICS & GYNECOLOGY

## 2023-05-03 PROCEDURE — 94760 N-INVAS EAR/PLS OXIMETRY 1: CPT

## 2023-05-03 PROCEDURE — 97116 GAIT TRAINING THERAPY: CPT | Mod: CQ

## 2023-05-03 PROCEDURE — 63600175 PHARM REV CODE 636 W HCPCS: Performed by: STUDENT IN AN ORGANIZED HEALTH CARE EDUCATION/TRAINING PROGRAM

## 2023-05-03 RX ORDER — AZITHROMYCIN 1 G/1
1 POWDER, FOR SUSPENSION ORAL
Status: DISCONTINUED | OUTPATIENT
Start: 2023-05-03 | End: 2023-05-03

## 2023-05-03 RX ORDER — SEMAGLUTIDE 0.68 MG/ML
0.5 INJECTION, SOLUTION SUBCUTANEOUS
Qty: 3 ML | Refills: 1 | Status: SHIPPED | OUTPATIENT
Start: 2023-05-03 | End: 2023-08-14 | Stop reason: DRUGHIGH

## 2023-05-03 RX ORDER — AMLODIPINE BESYLATE 5 MG/1
5 TABLET ORAL DAILY
Qty: 90 TABLET | Refills: 3 | Status: SHIPPED | OUTPATIENT
Start: 2023-05-03 | End: 2024-02-07 | Stop reason: DRUGHIGH

## 2023-05-03 RX ORDER — OXYCODONE AND ACETAMINOPHEN 7.5; 325 MG/1; MG/1
1 TABLET ORAL EVERY 4 HOURS PRN
Qty: 20 TABLET | Refills: 0 | Status: SHIPPED | OUTPATIENT
Start: 2023-05-03 | End: 2023-05-11

## 2023-05-03 RX ORDER — INSULIN ASPART 100 [IU]/ML
5 INJECTION, SOLUTION INTRAVENOUS; SUBCUTANEOUS
Status: DISCONTINUED | OUTPATIENT
Start: 2023-05-03 | End: 2023-05-03 | Stop reason: HOSPADM

## 2023-05-03 RX ORDER — AZITHROMYCIN 500 MG/1
1000 TABLET, FILM COATED ORAL ONCE
Status: COMPLETED | OUTPATIENT
Start: 2023-05-03 | End: 2023-05-03

## 2023-05-03 RX ADMIN — INSULIN ASPART 6 UNITS: 100 INJECTION, SOLUTION INTRAVENOUS; SUBCUTANEOUS at 05:05

## 2023-05-03 RX ADMIN — AMLODIPINE BESYLATE 5 MG: 5 TABLET ORAL at 08:05

## 2023-05-03 RX ADMIN — AZITHROMYCIN 1000 MG: 500 TABLET, FILM COATED ORAL at 09:05

## 2023-05-03 RX ADMIN — INSULIN ASPART 5 UNITS: 100 INJECTION, SOLUTION INTRAVENOUS; SUBCUTANEOUS at 12:05

## 2023-05-03 RX ADMIN — METOCLOPRAMIDE 10 MG: 10 TABLET ORAL at 08:05

## 2023-05-03 RX ADMIN — SIMETHICONE 125 MG: 125 TABLET, CHEWABLE ORAL at 12:05

## 2023-05-03 RX ADMIN — OXYCODONE HYDROCHLORIDE AND ACETAMINOPHEN 1 TABLET: 10; 325 TABLET ORAL at 08:05

## 2023-05-03 RX ADMIN — DOXYCYCLINE HYCLATE 100 MG: 100 TABLET, COATED ORAL at 08:05

## 2023-05-03 RX ADMIN — SODIUM CHLORIDE, PRESERVATIVE FREE 10 ML: 5 INJECTION INTRAVENOUS at 05:05

## 2023-05-03 RX ADMIN — INSULIN ASPART 5 UNITS: 100 INJECTION, SOLUTION INTRAVENOUS; SUBCUTANEOUS at 08:05

## 2023-05-03 RX ADMIN — FAMOTIDINE 20 MG: 20 TABLET, FILM COATED ORAL at 08:05

## 2023-05-03 RX ADMIN — OXYCODONE HYDROCHLORIDE AND ACETAMINOPHEN 1 TABLET: 5; 325 TABLET ORAL at 03:05

## 2023-05-03 RX ADMIN — INSULIN ASPART 2 UNITS: 100 INJECTION, SOLUTION INTRAVENOUS; SUBCUTANEOUS at 12:05

## 2023-05-03 RX ADMIN — LOSARTAN POTASSIUM 50 MG: 50 TABLET, FILM COATED ORAL at 08:05

## 2023-05-03 RX ADMIN — SODIUM CHLORIDE, PRESERVATIVE FREE 10 ML: 5 INJECTION INTRAVENOUS at 12:05

## 2023-05-03 RX ADMIN — CHLORHEXIDINE GLUCONATE 0.12% ORAL RINSE 15 ML: 1.2 LIQUID ORAL at 08:05

## 2023-05-03 RX ADMIN — METRONIDAZOLE 500 MG: 500 TABLET ORAL at 05:05

## 2023-05-03 RX ADMIN — SIMETHICONE 125 MG: 125 TABLET, CHEWABLE ORAL at 05:05

## 2023-05-03 RX ADMIN — MUPIROCIN: 20 OINTMENT TOPICAL at 08:05

## 2023-05-03 NOTE — PROGRESS NOTES
GYNECOLOGY PROGRESS NOTE  5/1/2023     Admission dx Tuboovarian abscess,pelvic hematoma     Interval History: Pt went for IR drainage of TOA. Procedure unsuccessful and complicated by bleed of R iliac circumflex artery, successfully embolized. Pt remained intubated and was transferred to ICU for acute resuscitation and blood transfusion. Initial Hgb post-procedure was 3.9, however blood sample noted to be diluted. Pt received 2U pRBCs with improvement in Hgb to 12.6. Patient was extubated and stabilized in ICU and transferred to Med/Surg floor in stable condition.     Today, patient has now gone over 48 hours without fever. Patient still with continued RLQ pain however it is reduced. Patient states that this AM she did note 1x episode of vaginal bleeding. Patient continuing to work with PT today.    Allergies: Review of patient's allergies indicates:  No Known Allergies          Past Medical History:   Diagnosis Date    Diabetes mellitus      Hypertension                 Past Surgical History:   Procedure Laterality Date    BREAST LUMPECTOMY Right      TUBAL LIGATION             MEDS:   No current facility-administered medications on file prior to encounter.             Current Outpatient Medications on File Prior to Encounter   Medication Sig Dispense Refill    ALBUTEROL INHL Inhale 2 puffs into the lungs as needed.        losartan (COZAAR) 25 MG tablet Take 25 mg by mouth once daily.        metFORMIN (GLUCOPHAGE) 1000 MG tablet Take 1,000 mg by mouth 2 (two) times daily with meals.             OB History    No obstetric history on file.            Social History               Socioeconomic History    Marital status:    Tobacco Use    Smoking status: Never       Passive exposure: Never    Smokeless tobacco: Never   Substance and Sexual Activity    Alcohol use: Never    Drug use: Never    Sexual activity: Yes       Partners: Male            History reviewed. No pertinent family history.     Vitals:  Vitals           Vitals:     04/29/23 0715 04/29/23 0730 04/29/23 0745 04/29/23 0828   BP: (!) 153/73 (!) 154/71  Comment: Simultaneous filing. User may not have seen previous data. (!) 157/72 (!) 155/70   BP Location:     Right arm     Patient Position:     Lying     Pulse: 105 105  Comment: Simultaneous filing. User may not have seen previous data. 105     Resp: (!) 24 (!) 24  Comment: Simultaneous filing. User may not have seen previous data. (!) 25 (!) 25   Temp:     99.5 °F (37.5 °C)     TempSrc:     Axillary     SpO2: 99% 100%  Comment: Simultaneous filing. User may not have seen previous data. 100%     Weight:           Height:                       Physical Exam:   General: NAD, WNWD  CV: RRR, S1 and S2 present  Pulm: CTAB, limited air movement due to discomfort from abd distention  ABD: tender upon palpation to RLQ, no apparent guarding, normal bowel sounds throughout  Ext: No edema, scd's in place     Lab Results   Component Value Date    WBC 9.61 05/02/2023    HGB 10.2 (L) 05/02/2023    HCT 31.3 (L) 05/02/2023    MCV 88 05/02/2023     05/02/2023      CMP  Sodium   Date Value Ref Range Status   05/02/2023 136 136 - 145 mmol/L Final     Potassium   Date Value Ref Range Status   05/02/2023 3.6 3.5 - 5.1 mmol/L Final     Chloride   Date Value Ref Range Status   05/02/2023 106 95 - 110 mmol/L Final     CO2   Date Value Ref Range Status   05/02/2023 20 (L) 23 - 29 mmol/L Final     Glucose   Date Value Ref Range Status   05/02/2023 197 (H) 70 - 110 mg/dL Final     BUN   Date Value Ref Range Status   05/02/2023 7 6 - 20 mg/dL Final     Creatinine   Date Value Ref Range Status   05/02/2023 0.7 0.5 - 1.4 mg/dL Final     Calcium   Date Value Ref Range Status   05/02/2023 8.8 8.7 - 10.5 mg/dL Final     Total Protein   Date Value Ref Range Status   05/02/2023 6.9 6.0 - 8.4 g/dL Final     Albumin   Date Value Ref Range Status   05/02/2023 2.4 (L) 3.5 - 5.2 g/dL Final     Total Bilirubin   Date Value Ref Range Status    05/02/2023 0.6 0.1 - 1.0 mg/dL Final     Comment:     For infants and newborns, interpretation of results should be based  on gestational age, weight and in agreement with clinical  observations.    Premature Infant recommended reference ranges:  Up to 24 hours.............<8.0 mg/dL  Up to 48 hours............<12.0 mg/dL  3-5 days..................<15.0 mg/dL  6-29 days.................<15.0 mg/dL       Alkaline Phosphatase   Date Value Ref Range Status   05/02/2023 63 55 - 135 U/L Final     AST   Date Value Ref Range Status   05/02/2023 12 10 - 40 U/L Final     ALT   Date Value Ref Range Status   05/02/2023 10 10 - 44 U/L Final     Anion Gap   Date Value Ref Range Status   05/02/2023 10 8 - 16 mmol/L Final     eGFR   Date Value Ref Range Status   05/02/2023 >60 >60 mL/min/1.73 m^2 Final     CTA ABDOMEN AND PELVIS     TECHNIQUE:  Using 75 cc of  Omnipaque 350 IV, and multi-detector helical CT technique, axial CT angiogram images of the abdomen were obtained from the lung bases through the pelvis. Precontrast and portal venous phase images of the abdomen and pelvis also done. 2D post-processing coronal and sagittal reconstructions of the abdominal aorta and visceral arteries performed.     COMPARISON:  CT, 04/28/2023 at 16:24 hours     FINDINGS:  There is a large retroperitoneal hematoma on the right extending from the tip of the liver is through the lateral conal fascia is to just proximal to the inguinal ligament.  No groin hematoma is evident.     Following contrast administration there is active extravasation in the central portion of the hematoma suggesting active ooze is.     No other extravasation is seen elsewhere.  Heterogeneity and enlarged uterus with a tubular structure in the right adnexa again noted compatible with hydro pyosalpinx.     The lung bases are well aerated.  No consolidation, suspicious nodules, or pleural effusion.  The visualized portions of the heart appear normal.     The esophagus,  stomach, spleen, pancreas, and adrenal glands are unremarkable.     The liver is normal in size and attenuation without focal abnormality.  The portal veins appear patent.  The gallbladder shows no evidence of stones or cholecystitis.  No intra-or extrahepatic biliary ductal dilatation.     The kidneys demonstrate normal enhancement.  No renal mass, nephrolithiasis or hydronephrosis.  The ureters are normal in course and caliber. The urinary bladder appears unremarkable     No evidence of gastrointestinal hemorrhage, bowel obstruction, or inflammation.  There is no ascites, free fluid, or intraperitoneal free air. No significant peritoneal or retroperitoneal adenopathy.     The abdominal aorta is normal in course and caliber without significant atherosclerotic calcifications.     The osseous structures and extraperitoneal soft tissues are unremarkable.     Impression:     Retroperitoneal hematoma in the lateral conal fascia of the right iliac fossa abdomen with active extravasation.  No significant increase in size since the earlier CT.     Persistent enlarged heterogeneous multi fibroid uterus and tubular fluid-filled structure in the adnexa on the right compatible with hydro pyosalpinx.     No other acute finding evident within the abdomen or pelvis.        Electronically signed by: Austin Byers  Date:                                            04/28/2023  Time:                                           23:57  ---------------------------------------------------------------------------------------------  AP View(s) of the abdomen was performed.     COMPARISON:  None     FINDINGS:  Two views abdomen supine.     Air and stool is seen within the large bowel and projected over the rectum.  There are several scattered prominent air-filled small bowel loops throughout the abdomen and pelvis.  No findings to suggest pneumatosis.  There are no calcifications to suggest nephrolithiasis or cholelithiasis.  The osseous  structures are intact.  There is coarse interstitial attenuation of the visualized pulmonary parenchyma.     Impression:     1. Slow flow through the small bowel, could reflect developing ileus, correlation and follow-up is advised.        Electronically signed by: Kobe Pugh MD  Date:                                            04/29/2023  Time:                                           14:46      Assessment:  Chelsi Johnson is a 43 y.o. female with PMH T2DM, HTN who was admitted for tubo-ovarian abscess. Went for IR drainage of TOA on 4/28/23 complicated by bleed of R iliac circumflex artery, successfully embolized. TOA unable to be accessed. Pt then required transfer to ICU intubated for continued resuscitation and management after arterial bleed. Received 2U pRBCs with resolution of acute blood loss anemia.      Plan:     #TOA  - Daily CBC, WBC improving  - Blood cx x 2 NGTD  - Patient transitioned to oral antibiotics today (previously on IV rocephin/doxy/flagyl). Patient without leukocytosis  -  will defer any further intervention at this time given complication with initial procedure.        #Pelvic arterial bleed  - Complication of attempt at TOA drainage 4/29/23. Successfully embolized. S/p 2U pRBCs 4/29.  - Patient vitally stable since embolization, HgB also stable  - Continuing to hold patient's scheduled NSAIDS  - Will follow IR recs for re- imaging if needed     #T2DM  - Hold home metformin while inpatient   - Hgb A1C 10%, will need follow-up with PCP for further management outpatient  - Daily CMP  - MDSSI and levemir 16u BID with goal glucose 140-180  Medicine consults placed for dm mgmt inpt, will follow there recs in optimizing BS control  - Will likely need additional diabetic agents on discharge     #HTN  - Losartan initially held due to hypotension from acute blood loss however this has resolved.  - will follow medicine recs    Ulcer PPX: pepcid   DVT PPX: SCDs in place, continue to work with  PT/OT    Diet: diabetic diet  Dispo: Afebrile for >48 hrs, candidate for discharge today      I have reviewed the notes, assessments, and/or procedures performed by resident   , I concur with her/his documentation of Chelsi Johnson.     Donnell Jansen M.D.   OB/GYN    5/3/2023

## 2023-05-03 NOTE — NURSING
AVS reviewed with patient by virtual nurse. Verbalized understanding of upcoming appointments and home medications. PICC line and Cardiac monitoring removed. Prescriptions and DME delivered to bedside. W/c transport to escort patient to main lobby. Voiced no concerns.

## 2023-05-03 NOTE — PLAN OF CARE
"Pt for d/c to home today   CM met with pt per [facility] - will need HH, RW and bsc at d/c   CM spoke with pt's friend Vimal  - Ms. Celis owns a [employer] service - not a HH agency- pt informed  - per Ms. Alex - ok to use [facility]     Referral sent per [facility] - pt accepted by [facility] - 1st visit Fri 5/05   MD placed order for RW; order for BSC placed at therapy rec     Awaiting "ok" per [facility].    Pt consented to f/u with [facility] at d/c    BSC and RW delivered to pt   Pt choice form signed.        05/03/23 1205   Final Note   Assessment Type Final Discharge Note   Anticipated Discharge Disposition Home-Health  ([facility])   What phone number can be called within the next 1-3 days to see how you are doing after discharge? [phone]   Hospital Resources/Appts/Education Provided Appointments scheduled and added to AVS;Post-Acute resouces added to AVS   Post-Acute Status   Post-Acute Authorization Home Health   Home Health Status Set-up Complete/Auth obtained   Discharge Delays None known at this time         "

## 2023-05-03 NOTE — PROGRESS NOTES
Consult note  [facility]    Consulted by: Cirilo Sampson MD  Reason for Consult: T2DM and HTN    History of Present Illness: Patient is a 43 y.o. female with a PMHx T2DM, HTN here for bilateral tubo-ovarian abscesses. IR attempted drainage of the right tubo-ovarian ovarian abscess but was complicated by extravasation of the right circumflex artery resulting in formation of large hematoma which required emergent embolization. Patient extubated on 04/28/2023 and remains admitted within the ICU for closer monitoring. Patient is receiving IV antibiotics for her TOA including ceftriaxone, doxycycline, and metronidazole. [facility] has been consulted for management of patient's type 2 diabetes and hypertension.    Patient endorses she is currently taking losartan 25 mg daily and metformin 1000 mg daily at home. Hemoglobin A1c on admission was 10% and patient has maintained BG levels in the upper 200s requiring detemir and SSI.    Interval Hx:  Pt has ongoing RLQ pain, but improving. BP has been stable. Denies N/V.  this morning.    PTA Medications   Medication Sig    ALBUTEROL INHL Inhale 2 puffs into the lungs as needed.    metFORMIN (GLUCOPHAGE) 1000 MG tablet Take 1,000 mg by mouth 2 (two) times daily with meals.       Review of patient's allergies indicates:  No Known Allergies    Past Medical History:   Diagnosis Date    Diabetes mellitus     Hypertension      Past Surgical History:   Procedure Laterality Date    BREAST LUMPECTOMY Right     TUBAL LIGATION       History reviewed. No pertinent family history.  Social History     Tobacco Use    Smoking status: Never     Passive exposure: Never    Smokeless tobacco: Never   Substance Use Topics    Alcohol use: Never    Drug use: Never       Review of Systems:   Review of Systems   Constitutional:  Negative for fever.   Respiratory:  Negative for shortness of breath.    Cardiovascular:  Negative for chest pain.   Gastrointestinal:  Positive for  abdominal pain. Negative for nausea and vomiting.   Musculoskeletal:  Negative for back pain.   Neurological:  Negative for headaches.      OBJECTIVE:     Vital Signs (Most Recent)  Temp: 98.4 °F (36.9 °C) (05/03/23 0520)  Pulse: 100 (05/03/23 0520)  Resp: 20 (05/03/23 0520)  BP: 139/85 (05/03/23 0520)  SpO2: 97 % (05/03/23 0520)    Physical Exam:  Gen - WNWD, NAD  CV - tachycardic with normal sinus rhythm, no LE edema  Resp - breathing comfortably on RA, CTAB  Abd - diffuse tenderness but maximal at RLQ soft (improving), distended abdomen, BS normal, no rebound tenderness or guarding  Psych - logical thought process, answers questions appropriately     Laboratory  Lab Results   Component Value Date    WBC 9.61 05/02/2023    HGB 10.2 (L) 05/02/2023    HCT 31.3 (L) 05/02/2023    MCV 88 05/02/2023     05/02/2023      CMP  Sodium   Date Value Ref Range Status   05/02/2023 136 136 - 145 mmol/L Final     Potassium   Date Value Ref Range Status   05/02/2023 3.6 3.5 - 5.1 mmol/L Final     Chloride   Date Value Ref Range Status   05/02/2023 106 95 - 110 mmol/L Final     CO2   Date Value Ref Range Status   05/02/2023 20 (L) 23 - 29 mmol/L Final     Glucose   Date Value Ref Range Status   05/02/2023 197 (H) 70 - 110 mg/dL Final     BUN   Date Value Ref Range Status   05/02/2023 7 6 - 20 mg/dL Final     Creatinine   Date Value Ref Range Status   05/02/2023 0.7 0.5 - 1.4 mg/dL Final     Calcium   Date Value Ref Range Status   05/02/2023 8.8 8.7 - 10.5 mg/dL Final     Total Protein   Date Value Ref Range Status   05/02/2023 6.9 6.0 - 8.4 g/dL Final     Albumin   Date Value Ref Range Status   05/02/2023 2.4 (L) 3.5 - 5.2 g/dL Final     Total Bilirubin   Date Value Ref Range Status   05/02/2023 0.6 0.1 - 1.0 mg/dL Final     Comment:     For infants and newborns, interpretation of results should be based  on gestational age, weight and in agreement with clinical  observations.    Premature Infant recommended reference  ranges:  Up to 24 hours.............<8.0 mg/dL  Up to 48 hours............<12.0 mg/dL  3-5 days..................<15.0 mg/dL  6-29 days.................<15.0 mg/dL       Alkaline Phosphatase   Date Value Ref Range Status   05/02/2023 63 55 - 135 U/L Final     AST   Date Value Ref Range Status   05/02/2023 12 10 - 40 U/L Final     ALT   Date Value Ref Range Status   05/02/2023 10 10 - 44 U/L Final     Anion Gap   Date Value Ref Range Status   05/02/2023 10 8 - 16 mmol/L Final        Lab Results   Component Value Date    INR 1.1 04/29/2023    INR 1.6 (H) 04/28/2023      No results for input(s): CPK, CPKMB, TROPONINI, MB in the last 168 hours.   No results for input(s): TROPONINI, CKTOTAL, CKMB in the last 168 hours.    BNP  No results for input(s): BNP in the last 168 hours.    Urinalysis  No results for input(s): COLORU, CLARITYU, SPECGRAV, PHUR, PROTEINUA, GLUCOSEU, BILIRUBINCON, BLOODU, WBCU, RBCU, BACTERIA, MUCUS, NITRITE, LEUKOCYTESUR, UROBILINOGEN, HYALINECASTS in the last 24 hours.   LAST HbA1c  Lab Results   Component Value Date    HGBA1C 10.0 (H) 04/28/2023       Diagnostic Results:  Labs: Reviewed  X-Ray: Reviewed    Imaging Results              MRI Abdomen-Pelvis w w/o Contrast (XPD) (Final result)  Result time 04/28/23 08:27:21      Final result by Chencho Joseph DO (04/28/23 08:27:21)                   Impression:      1. Inflamed, distended, fluid-filled tubular structure in the right adnexa/right lower quadrant, favored to represent a distended fallopian tube with hydrosalpinx/pyosalpinx.  Acute appendicitis is felt less likely.  2. Smaller distended tubular structure in the left adnexa, also concerning for hydrosalpinx/pyosalpinx.  3. Large fibroid uterus.  4. Hepatomegaly and hepatic steatosis.      Electronically signed by: Chencho Joseph  Date:    04/28/2023  Time:    08:27               Narrative:    EXAMINATION:  MRI ABDOMEN-PELVIS W W/O CONTRAST (XPD)    CLINICAL HISTORY:  RLQ abdominal pain  (Age >= 14y);    TECHNIQUE:  Multisequence, multiplanar MRI of the abdomen performed per liver protocol before and after administration of 10 mL Gadavist intravenous contrast.    COMPARISON:  CT of the abdomen and pelvis from 04/27/2023.    FINDINGS:  Lung bases: The lung bases are grossly clear.    Liver: The liver is enlarged and demonstrates diffuse loss of signal on out of phase imaging compatible with hepatic steatosis.  There are no focal hepatic lesions.    Biliary: No intrahepatic or extrahepatic biliary dilatation.    Gallbladder: The gallbladder is unremarkable without evidence of cholelithiasis, gallbladder wall thickening, or pericholecystic fluid.    Pancreas: Unremarkable.  No pancreatic ductal dilatation.    Spleen: Normal size.  No focal lesions.    Adrenal glands: Unremarkable.    Kidneys: No renal masses.  No hydronephrosis.    GI: Visualized bowel loops are unremarkable.    Pelvis: There is an inflamed dilated tubular structure in the right adnexa/right lower quadrant measuring up to approximately 2.3 cm in maximal diameter, with adjacent surrounding edema and fat stranding.  The tubular structure does not definitively arise from the cecum and is favored to represent a distended fallopian tube with hydrosalpinx/pyosalpinx.  The appendix is not definitively seen separate from this structure.  There is an additional smaller tubular structure in the left adnexa with a diameter of approximately 1.1 cm, without significant surrounding edema.    Reproductive: There are multiple large uterine fibroids.  There is trace free fluid in the pelvis.    Mesentery: No ascites. No focal fluid collection. No free air.    Body wall: Unremarkable.    Musculoskeletal: Marrow signal is unremarkable.                                       CT Abdomen Pelvis With Contrast (Final result)  Result time 04/27/23 20:39:21      Final result by Lobo Tobias MD (04/27/23 20:39:21)                   Impression:      Enlarged  multi fibroid uterus.  Recommend clinical correlation for fibroid degeneration.  Recommend follow-up MRI with contrast for further evaluation on a nonemergent basis    Fatty infiltration liver with hepatomegaly    There is a dilated cystic structure with peripheral enhancement in the right lower quadrant measuring up to 24 mm in diameter may relate to an inflamed appendix.  Differential diagnosis includes dilated right-sided fallopian tubes which is not well delineated.  Additional consideration includes oral contrast administration and repeat CT.    All CT scans at this facility use dose modulation, iterative reconstruction, and/or weight based dosing when appropriate to reduce radiation dose to as low as reasonably achievable.      Electronically signed by: Lobo Tobias  Date:    04/27/2023  Time:    20:39               Narrative:    EXAMINATION:  CT ABDOMEN PELVIS WITH CONTRAST    CLINICAL HISTORY:  RLQ abdominal pain (Age >= 14y);    TECHNIQUE:  Low dose axial images, sagittal and coronal reformations were obtained from the lung bases to the pubic symphysis following the IV administration of 100 mL of Omnipaque 350.    COMPARISON:  None    FINDINGS:  Heart: Normal size as far as seen. No effusion as far as seen.    Lung Bases: Clear.    Liver: Fatty infiltration of liver with hepatomegaly..  No focal lesions.    Gallbladder: No calcified gallstones.    Bile Ducts: No dilatation.    Pancreas: No mass. No peripancreatic fat stranding.    Spleen: Normal.    Adrenals: Normal.    Kidneys/Ureters: Normal enhancement.  No mass or  hydroureteronephrosis.    Bladder: No wall thickening.    Reproductive organs: Heterogeneous clean hand sing enlarged multi fibroid uterus.    GI Tract/Mesentery: No evidence of bowel obstruction or inflammation.    There is a dilated cystic structure with peripheral enhancement in the right lower quadrant measuring up to 24 mm in diameter may relate to an inflamed appendix. Differential  diagnosis includes dilated right-sided fallopian tubes which is not well delineated. Additional consideration includes oral contrast administration and repeat CT.    Peritoneal Space: No ascites or free air.    Retroperitoneum: No significant adenopathy.    Abdominal wall: Normal.    Vasculature: No aneurysm.    Bones: No acute fracture. No suspicious lytic or sclerotic lesions.                                      ASSESSMENT/PLAN:   43 y.o.female has a past medical history of Diabetes mellitus and Hypertension. here for tubo-ovarian abscess complicated by extravasation of the right circumflex artery resulting in formation of large hematoma requiring emergent embolization. S/p extubation on 04/28/2023. Family Medicine consulted for inpatient management of HTN and DM. Patient currently hemodynamically stable on the floor.    #TOA  # Pelvic arterial bleed  - Management per Primary Team - OBGYN    #Essential Hypertension   - /83 this AM, improving  - Home medications include losartan 25 mg daily.  - Within the past 24 hours, patient with improved BP reading showing systolics in the 130s and diastolics in the 80s   - Continue losartan 50 mg daily  - On amlodipine 5 mg  - Can use IV hydralazine or labetolol PRN for SYS > 180  - Can consider additional hypertensive agents if BP uncontrolled     #T2DM  - HbA1c 10% on admission  -  this AM  - Initially on insulin detemir 15 units nightly + SSI --> 12 units BID --> 16 units BID --> 20 units  BID --> 25 units BID and aspart 5 units TID  - Continue to titrate as needed to a blood glucose goal of 140-180 while inpatient    #Impaired mobility  - Pt has Right foot calcaneal spur, affecting her mobility as she is unable to walk freely  - Working with PT inpatient for impaired balance, decreased LE function, decreased ROM, gait instability, weakness, and impaired functional mobility  - PT recommends rolling walker on discharge      Family medicine will continue to  follow while inpatient. Please contact us if you have any further questions. Thank you for the consult.      Yoana Ribeiro MD, MPH  [facility] PGY-1  05/03/2023

## 2023-05-03 NOTE — PLAN OF CARE
AVS and educational attachments shared with patient and  via [email or web address]. Discussed thoroughly. Patient and  verbalized clear understanding using teach back method. Notified bedside nurse of completion of education. At present no distress noted. Patient to be discharged via w/c with escort service and family with all of patient's belonings. Will cont to be available to patient and family and intervene prn.

## 2023-05-03 NOTE — PLAN OF CARE
Problem: Physical Therapy  Goal: Physical Therapy Goal  Description: Goals to be met by: 2023     Patient will increase functional independence with mobility by performin. Supine to sit with Modified [place]  2. Sit to supine with Modified [place]  3. Sit to stand transfer with Modified [place]  4. Gait  x 250 feet with Modified [place] using Rolling Walker.     Outcome: Ongoing, Progressing

## 2023-05-03 NOTE — NURSING
Called to room to ambulate patient to the restroom. Patient noted with dark urine with tinge of blood in urine. While wiping patient light red red blood was noted.

## 2023-05-03 NOTE — PT/OT/SLP PROGRESS
Physical Therapy Treatment    Patient Name:  Chelsi Johnson   MRN:  [id number]    Recommendations:     Discharge Recommendations: home health PT  Discharge Equipment Recommendations: walker, rolling, bedside commode  Barriers to discharge:  decreased mobility,strength and endurance    Assessment:     Chelsi Johnson is a 43 y.o. female admitted with a medical diagnosis of Tubo-ovarian abscess.  She presents with the following impairments/functional limitations: weakness, impaired endurance, impaired functional mobility, gait instability, impaired balance, decreased lower extremity function, pain, decreased ROM, impaired coordination, impaired skin,pt with good participation and got a little overheated this AM,pt will benefit from  services upon discharge.    Rehab Prognosis: Good; patient would benefit from acute skilled PT services to address these deficits and reach maximum level of function.    Recent Surgery: * No surgery found *      Plan:     During this hospitalization, patient to be seen 4 x/week to address the identified rehab impairments via gait training, therapeutic activities, therapeutic exercises, neuromuscular re-education and progress toward the following goals:    Plan of Care Expires:  05/30/23    Subjective     Chief Complaint: n/a  Patient/Family Comments/goals: pt may leave today.  Pain/Comfort:  Pain Rating 1:  (soreness)  Location 1: abdomen  Pain Addressed 1: Reposition, Distraction      Objective:     Communicated with nsg prior to session.  Patient found up in chair with peripheral IV, telemetry upon PT entry to room.     General Precautions: Standard, fall  Orthopedic Precautions: N/A  Braces: N/A  Respiratory Status: Room air     Functional Mobility:  Bed Mobility:     Scooting: modified independence  Transfers:     Sit to Stand:  contact guard assistance with rolling walker  Gait: amb ~15' X 2 with RW and S  Balance: fair standing balance with RW      AM-PAC 6 CLICK MOBILITY  Turning  over in bed (including adjusting bedclothes, sheets and blankets)?: 4  Sitting down on and standing up from a chair with arms (e.g., wheelchair, bedside commode, etc.): 3  Moving from lying on back to sitting on the side of the bed?: 3  Moving to and from a bed to a chair (including a wheelchair)?: 3  Need to walk in hospital room?: 3  Climbing 3-5 steps with a railing?: 3  Basic Mobility Total Score: 19       Treatment & Education: pt had to use bathroom during rx,washed hands at sink with RW and S,pt back to chair secondary to feeling a little overheated post gait      Patient left up in chair with all lines intact and call button in reach..    GOALS:   Multidisciplinary Problems       Physical Therapy Goals          Problem: Physical Therapy    Goal Priority Disciplines Outcome Goal Variances Interventions   Physical Therapy Goal     PT, PT/OT Ongoing, Progressing     Description: Goals to be met by: 2023     Patient will increase functional independence with mobility by performin. Supine to sit with Modified [place]  2. Sit to supine with Modified [place]  3. Sit to stand transfer with Modified [place]  4. Gait  x 250 feet with Modified [place] using Rolling Walker.                          Time Tracking:     PT Received On: 23  PT Start Time: 938     PT Stop Time: 1003  PT Total Time (min): 25 min     Billable Minutes: Gait Training 10 and Therapeutic Activity 15    Treatment Type: Treatment  PT/PTA: PTA     Number of PTA visits since last PT visit: 3     2023

## 2023-05-03 NOTE — PHYSICIAN QUERY
PT Name: Chelsi Johnson  MR #: [id number]    DOCUMENTATION CLARIFICATION     CDS/: Balbina Trejo,SVETAN,RNC-MNN         Contact information:[email or web address]    This form is a permanent document in the medical record.     Query Date: May 3, 2023    By submitting this query, we are merely seeking further clarification of documentation. Please utilize your independent clinical judgment when addressing the question(s) below.    Supporting Clinical Findings Location in Medical Record   #TOA  - Daily CBC, WBC improving  - GCCT still pending  - Blood cx x 2 NGTD  - Continue IV rocephin/doxy/flagyl. Patient without leukocytosis today however still with isolated fever last night. Will keep antibiotics as is for today and continue to monitor.   -  will defer any further intervention at this time given complication with initial procedure.   - If TOA persistent or no improvement with IV antibiotics, may need diagnostic Lap possible salpingectomy and possible oophorectomy. And evacuation of pelvic hematoma. However needs more time on antibiotics     Chlamydia, Amplified DNA  Value: Detected ! OB Progress note 5/1@640pm                              LAB 4/28           Provider, please clarify if there is any clinical correlation between Tuboovarian abscess and chlamydia.           Are the conditions:      [ x ] Due to or associated with each other   [  ] Unrelated to each other   [  ] Other explanation (Please Specify): ______________   [  ] Clinically Undetermined                                                                               Please document in your progress notes daily for the duration of treatment until resolved and include in your discharge summary.

## 2023-05-03 NOTE — PHYSICIAN QUERY
PT Name: Chelsi Johnson  MR #: [id number]     Documentation Clarification      CDS/: KLARISSA Flores,RNC-MNN        Contact information:[email or web address]    This form is a permanent document in the medical record.     Query Date: May 3, 2023    By submitting this query, we are merely seeking further clarification of documentation. Please utilize your independent clinical judgment when addressing the question(s) below.    The Medical Record reflects the following:    Clinical Findings Location in Medical Records   Impression;     -Acute respiratory failure  -Tubo ovarian abscess  -DM  -HTN     Plan; Continue present management of IV antibiotics. Allow to remain on the ventilator overnight and in the AM weaning trials. Monitor H/H     Patient was intubated for this procedure and subsequently admitted to the MICU. Patient successfully passed her SAT/SBT this morning and was extubated. She is now on RA doing well clinically. Vitals are stable.     intubated for airway protection/procedure on minimal vent settings     Pt on RA, no respiratory distress noted. Will continue to monitor Critical care progress note 4/29@409am                    Pulmonology consult note 4/29              Respiratory plan of care note 4/29@733pm         Due to the conflicting clinical picture, please clinically validate the diagnosis of Acute respiratory failure.    If validated, please provide additional clinical support for the diagnosis.       [ x  ] Above stated diagnosis is not confirmed and/or it has been ruled out   [   ] Above stated diagnosis is not confirmed and/or it has been ruled out, other diagnosis ruled in (please          specify):_______________   [   ] Above stated diagnosis is confirmed. Please specify clinical support (signs, symptoms, and treatment) for  the confirmed diagnosis: ____________________   [   ] Other clarification (please specify): ___________________       Form No. [id number]

## 2023-05-03 NOTE — NURSING
RAPID RESPONSE NURSE PROACTIVE ROUNDING NOTE       Time of Visit: Chart review    Admit Date: 2023  LOS: 5  Code Status: Full Code   Date of Visit: 2023  : 1980  Age: 43 y.o.  Sex: female  Race: Black or   Bed: K522/K522 A:   MRN: [id number]  Was the patient discharged from an ICU this admission? Yes   Was the patient discharged from a PACU within last 24 hours? No   Did the patient receive conscious sedation/general anesthesia in last 24 hours? No   Was the patient in the ED within the past 24 hours? No   Was the patient on NIPPV within the past 24 hours? No   Attending Physician: Cirilo Sampson MD  Primary Service: Obstetrics and Gynecology       SITUATION      Diagnosis: Tubo-ovarian abscess   has a past medical history of Diabetes mellitus and Hypertension.    Last Vitals:  Temp: 98.2 °F (36.8 °C) (804)  Pulse: 103 (804)  Resp: 18 (804)  BP: 135/83 (804)  SpO2: 97 % (832)    24 Hour Vitals Range:  Temp:  [97.7 °F (36.5 °C)-99.3 °F (37.4 °C)]   Pulse:  []   Resp:  [18-20]   BP: (129-161)/(77-96)   SpO2:  [96 %-100 %]         ASSESSMENT/INTERVENTIONS    Patient previously on list to monitor H&H due to vaginal bleeding. H&H and Vital signs have remained stable. Able to offer assistance and intervene if needed.

## 2023-05-04 LAB
BACTERIA BLD CULT: NORMAL
BACTERIA BLD CULT: NORMAL

## 2023-05-04 NOTE — HOSPITAL COURSE
Patient was placed in observation and IR consulted who attempted drainage of the right tubo-ovarian ovarian abscess but was complicated by extravasation of the right iliac circumflex artery resulting in formation of large hematoma which required emergent embolization. Patient was intubated for this procedure and subsequently admitted to the MICU. Initial Hgb post-procedure was 3.9, however blood sample noted to be diluted. Pt received 2U pRBCs with improvement in Hgb to 12.6. Patient was extubated and stabilized in ICU and transferred to Med/Surg floor in stable condition. Throughout hospitalization patient received IV antibiotics for her TOA including ceftriaxone, doxycycline, and metronidazole. Blood cultures also obtained and remained NGTD throughout admission. CBC's throughout admission remained stable after initial transfusion and leukocytosis improved to WNL. Patient temperature was monitored and after 48 hours without fever patient was transitioned to oral antibiotics. Patient also found to by hypertensive and hyperglycemic in which hospital medicine was consulted for hypertension/diabetes management. Patient continued to be afebrile and with noted improvement in abdominal pain and clinical status on day of discharge. PT/OT consulted and recommended home health PT/OT which was ordered. Patient was also found to be chlamydia positive in which patient was instructed to continue to her current antibiotic regimen and abstain from intercourse for 2 weeks. Partner was also instructed to get tested/treated for chlamydia as well. Patient was discharged with plan to continue her antibiotic regimen as well as started on januvia and ozempic for her existing diabetes. Patient to also have close follow up with PCP and OBGYN clinic. Patient understanding of plan and strict return precautions were given.

## 2023-05-04 NOTE — DISCHARGE SUMMARY
[facility] Medicine  Discharge Summary      Patient Name: Chelsi Johnson  MRN: [id number]  LILI: [id number]  Patient Class: IP- Inpatient  Admission Date: 4/27/2023  Hospital Length of Stay: 5 days  Discharge Date and Time: 5/3/2023  1:39 PM  Attending Physician: Violet att. providers found   Discharging Provider: Cirilo Bedolla MD  Primary Care Provider: Rosa Maria Cifuentes MD    Primary Care Team: Networked reference to record PCT     HPI:   No notes on file    * No surgery found *      Hospital Course:   Patient was placed in observation and IR consulted who attempted drainage of the right tubo-ovarian ovarian abscess but was complicated by extravasation of the right iliac circumflex artery resulting in formation of large hematoma which required emergent embolization. Patient was intubated for this procedure and subsequently admitted to the MICU. Initial Hgb post-procedure was 3.9, however blood sample noted to be diluted. Pt received 2U pRBCs with improvement in Hgb to 12.6. Patient was extubated and stabilized in ICU and transferred to [facility]/Surg floor in stable condition. Throughout hospitalization patient received IV antibiotics for her TOA including ceftriaxone, doxycycline, and metronidazole. Blood cultures also obtained and remained NGTD throughout admission. CBC's throughout admission remained stable after initial transfusion and leukocytosis improved to WNL. Patient temperature was monitored and after 48 hours without fever patient was transitioned to oral antibiotics. Patient also found to by hypertensive and hyperglycemic in which hospital medicine was consulted for hypertension/diabetes management. Patient continued to be afebrile and with noted improvement in abdominal pain and clinical status on day of discharge. PT/OT consulted and recommended home health PT/OT which was ordered. Patient was also found to be chlamydia positive in which patient was instructed to continue to her current  antibiotic regimen and abstain from intercourse for 2 weeks. Partner was also instructed to get tested/treated for chlamydia as well. Patient was discharged with plan to continue her antibiotic regimen as well as started on januvia and ozempic for her existing diabetes. Patient to also have close follow up with PCP and OBGYN clinic. Patient understanding of plan and strict return precautions were given.         Goals of Care Treatment Preferences:  Code Status: Full Code      Consults:   Consults (From admission, onward)        Status Ordering Provider     Inpatient consult to Family Practice  Once        Provider:  Cristobal Maldonado,     Completed PINKY LONG     Inpatient consult to Pulmonology  Once        Provider:  (Not yet assigned)    Completed PINKY LONG     Inpatient consult to Interventional Radiology  Once        Provider:  (Not yet assigned)    Completed TRISTAN VIZCAINO          No new Assessment & Plan notes have been filed under this hospital service since the last note was generated.  Service: Hospital Medicine    Final Active Diagnoses:    Diagnosis Date Noted POA    PRINCIPAL PROBLEM:  Tubo-ovarian abscess [N70.93] 04/28/2023 Yes    Type 2 diabetes mellitus without complication, without long-term current use of insulin [E11.9] 04/28/2023 Yes    Primary hypertension [I10] 04/28/2023 Yes      Problems Resolved During this Admission:       Discharged Condition: stable    Disposition: Home or Self Care    Follow Up:   Follow-up Information     Pinky Long MD. Go on 5/16/2023.    Specialty: Obstetrics and Gynecology  Why: Follow up  2:30 pm   - the office will contact you if a sooner apt becomes available.  Contact information:  [address]  [address]  [place] [address]  [phone]             [facility] Follow up on 5/10/2023.    Specialty: Family Medicine  Why: 2:00 pm  Contact information:  [address], [address]  [place]  "[place] [address] [phone]  Additional information:  Please park in [address] C or D and use [address]. Take [address] elevators.           Nithin  [facility] Follow up.    Why: Start of Care Friday, 5/05.  Contact information:  [address]  [place] [address] [phone]           [facility] Follow up.    Specialty: DME Provider  Why: DME  Contact information:  [address]  [facility] [place]  [facility] [address]  [phone]                       Patient Instructions:      WALKER FOR HOME USE     Order Specific Question Answer Comments   Type of Walker: Adult (5'4"-6'6")    With wheels? Yes    Height: 5' 4" (1.626 m)    Weight: 109.6 kg (241 lb 10 oz)    Length of need (1-99 months): 99    Does patient have medical equipment at home? none    Please check all that apply: Patient is unable to safely ambulate without equipment.      COMMODE FOR HOME USE     Order Specific Question Answer Comments   Type: Standard    Height: 5' 4" (1.626 m)    Weight: 109.6 kg (241 lb 10 oz)    Does patient have medical equipment at home? none    Length of need (1-99 months): 99      Ambulatory referral/consult to Home Health   Standing Status: Future   Referral Priority: Routine Referral Type: Home Health   Referral Reason: Specialty Services Required   Requested Specialty: Home Health Services   Number of Visits Requested: 1     Notify your health care provider if you experience any of the following:  temperature >100.4     Notify your health care provider if you experience any of the following:  persistent nausea and vomiting or diarrhea     Notify your health care provider if you experience any of the following:  severe uncontrolled pain     Notify your health care provider if you experience any of the following:  persistent dizziness, light-headedness, or visual disturbances     Notify your health care provider if you experience any of the following:  increased " confusion or weakness     Notify your health care provider if you experience any of the following:  temperature >100.4     Notify your health care provider if you experience any of the following:  persistent nausea and vomiting or diarrhea     Notify your health care provider if you experience any of the following:  severe persistent headache     Notify your health care provider if you experience any of the following:  increased confusion or weakness       Significant Diagnostic Studies: N/A    Pending Diagnostic Studies:     None         Medications:  Reconciled Home Medications:      Medication List      START taking these medications    amLODIPine 5 MG tablet  Commonly known as: NORVASC  Take 1 tablet (5 mg total) by mouth once daily.     doxycycline 100 MG capsule  Commonly known as: MONODOX  Take 1 capsule (100 mg total) by mouth 2 (two) times daily.     JANUVIA 100 MG Tab  Generic drug: SITagliptin phosphate  Take 1 tablet (100 mg total) by mouth once daily.     metroNIDAZOLE 500 MG tablet  Commonly known as: FLAGYL  Take 1 tablet (500 mg total) by mouth every 12 (twelve) hours.     ORIAHNN 300-1-0.5mg(AM) /300 mg(PM) Cpsq  Generic drug: elagolix-estradiol-norethindrn  Take 1 tablet by mouth 2 (two) times a day.     OZEMPIC 0.25 mg or 0.5 mg (2 mg/3 mL) pen injector  Generic drug: semaglutide  Inject 0.5 mg into the skin every 7 days.        CHANGE how you take these medications    losartan 25 MG tablet  Commonly known as: COZAAR  Take 2 tablets (50 mg total) by mouth once daily.  What changed: how much to take        CONTINUE taking these medications    ALBUTEROL INHL  Inhale 2 puffs into the lungs as needed.     metFORMIN 1000 MG tablet  Commonly known as: GLUCOPHAGE  Take 1,000 mg by mouth 2 (two) times daily with meals.            Indwelling Lines/Drains at time of discharge:   Lines/Drains/Airways     Drain  Duration                Sheath 04/28/23 1723 Right anterior 5 days                Time spent on  the discharge of patient: 30 minutes         Cirilo Bedolla MD PGY-2  Department of [facility] Medicine  [facility] S[facility]

## 2023-05-05 ENCOUNTER — TELEPHONE (OUTPATIENT)
Dept: OBSTETRICS AND GYNECOLOGY | Facility: CLINIC | Age: 43
End: 2023-05-05
Payer: COMMERCIAL

## 2023-05-05 NOTE — TELEPHONE ENCOUNTER
Returned pt nurse call who was by pt this morning nurse informs us that the pt sugar was 216 this am but a symptomatic and lethargic. Daughter called nurse to inform her that now the pt was gave her meds but threw them up and isn't eating and sugar is 316 now and going up. PT is on the way to [facility] they would like to know if we can send something for her future nausea episodes. Please advise

## 2023-05-05 NOTE — TELEPHONE ENCOUNTER
----- Message from Evi Moore sent at 5/5/2023 12:43 PM CDT -----  Type:  Patient Returning Call    Who Called:Theresa  Would the patient rather a call back or a response via [facility]? Call back   Best Call Back Number:[phone]  Additional Information: pt has nausea and can not keep her meds down .. pt sugar is 316 ... asking for something for nausea

## 2023-05-08 RX ORDER — ONDANSETRON 4 MG/1
4 TABLET, FILM COATED ORAL DAILY PRN
Qty: 30 TABLET | Refills: 1 | Status: SHIPPED | OUTPATIENT
Start: 2023-05-08 | End: 2023-10-06

## 2023-05-11 ENCOUNTER — OFFICE VISIT (OUTPATIENT)
Dept: FAMILY MEDICINE | Facility: HOSPITAL | Age: 43
End: 2023-05-11
Payer: COMMERCIAL

## 2023-05-11 VITALS
SYSTOLIC BLOOD PRESSURE: 121 MMHG | HEIGHT: 64 IN | DIASTOLIC BLOOD PRESSURE: 83 MMHG | HEART RATE: 93 BPM | WEIGHT: 221.81 LBS | BODY MASS INDEX: 37.87 KG/M2

## 2023-05-11 DIAGNOSIS — N70.93 TUBO-OVARIAN ABSCESS: Primary | ICD-10-CM

## 2023-05-11 DIAGNOSIS — E11.9 TYPE 2 DIABETES MELLITUS WITHOUT COMPLICATION, WITHOUT LONG-TERM CURRENT USE OF INSULIN: ICD-10-CM

## 2023-05-11 DIAGNOSIS — N70.93 TOA (TUBO-OVARIAN ABSCESS): ICD-10-CM

## 2023-05-11 DIAGNOSIS — I10 PRIMARY HYPERTENSION: ICD-10-CM

## 2023-05-11 PROCEDURE — 99213 OFFICE O/P EST LOW 20 MIN: CPT | Performed by: PHYSICIAN ASSISTANT

## 2023-05-11 RX ORDER — OXYCODONE AND ACETAMINOPHEN 5; 325 MG/1; MG/1
1 TABLET ORAL EVERY 4 HOURS PRN
Qty: 15 TABLET | Refills: 0 | Status: SHIPPED | OUTPATIENT
Start: 2023-05-11 | End: 2023-08-14

## 2023-05-11 RX ORDER — INSULIN GLARGINE 300 U/ML
10 INJECTION, SOLUTION SUBCUTANEOUS DAILY
COMMUNITY
End: 2023-08-14

## 2023-05-11 NOTE — PROGRESS NOTES
Subjective:      Patient ID: Chelsi Johnson is a 43 y.o. female.    Chief Complaint: Hospital Follow Up    Chelsi Johnson is a 43 year old F with PMH of T2DM, HTN here today for hospital follow up. She was recently admitted with tubo-ovarian abscess. IR was consulted for drainage of the right tubo-ovarian ovarian abscess but was complicated by extravasation of the right iliac circumflex artery resulting in formation of large hematoma which required emergent embolization. Received 2U PRBC. Abscess was treated with IV antibiotics including ceftriaxone, doxycycline, and metronidazole. BCx remained NGTD throughout admission. CBC's stable throughout admission. Home PT/OT was ordered. Also found to be chlamydia positive, which was covered by her PO discharge Abx. Started on januvia and ozempic for her existing diabetes.    Today  is accompanied by her partner. Overall doing well. Still having some abdominal pain, but improved. Denies any fevers, chills, n/v/d/c. Patient has been eating and drinking without issue. Appetite less than  normal, but ok. Still passing some clots vaginally. Denies any lightheadedness, dizziness.     T2DM: Discharged on Januvia and Ozempic in addition to her home metformin. Saw Dr. Cifuentes since discharge and was started on Toujeo. Has only taken Toujeo once. AM BG have been 190s-220s.     HTN: Controlled today on amlodipine 5mg and Losartan 25mg.     Review of Systems   All other systems reviewed and are negative.     Past Medical History:   Diagnosis Date    Diabetes mellitus     Hypertension    No family history on file.  Past Surgical History:   Procedure Laterality Date    BREAST LUMPECTOMY Right     TUBAL LIGATION       Social History     Socioeconomic History    Marital status:    Tobacco Use    Smoking status: Never     Passive exposure: Never    Smokeless tobacco: Never   Substance and Sexual Activity    Alcohol use: Never    Drug use: Never    Sexual activity: Yes      "Partners: Male     Objective:     Vitals:    05/11/23 1348   BP: 121/83   Pulse: 93   Weight: 100.6 kg (221 lb 12.5 oz)   Height: 5' 4" (1.626 m)     Physical Exam  Vitals and nursing note reviewed.   Constitutional:       General: She is not in acute distress.     Appearance: Normal appearance. She is well-developed. She is obese. She is not ill-appearing, toxic-appearing or diaphoretic.   HENT:      Head: Normocephalic and atraumatic.   Eyes:      Extraocular Movements: Extraocular movements intact.      Conjunctiva/sclera: Conjunctivae normal.   Neck:      Trachea: No tracheal deviation.   Cardiovascular:      Rate and Rhythm: Normal rate and regular rhythm.      Heart sounds: Normal heart sounds. No murmur heard.  Pulmonary:      Effort: Pulmonary effort is normal. No respiratory distress.      Breath sounds: Normal breath sounds. No wheezing.   Chest:      Chest wall: No tenderness.   Abdominal:      General: There is no distension.      Tenderness: There is no abdominal tenderness. There is no guarding.      Comments: Incision covered; c/d/I without s/s infection   Musculoskeletal:         General: No tenderness or deformity. Normal range of motion.   Skin:     General: Skin is warm and dry.      Coloration: Skin is not jaundiced.      Findings: No bruising or lesion.   Neurological:      General: No focal deficit present.      Mental Status: She is alert and oriented to person, place, and time.      Coordination: Coordination normal.   Psychiatric:         Mood and Affect: Mood normal.         Behavior: Behavior normal.         Thought Content: Thought content normal.         Judgment: Judgment normal.     CT Abscess Drainage  Impression:     1. Unsuccessful attempt to place a drain within a right lower quadrant tubo-ovarian abscess.  2. Right lower quadrant hematoma with active bleeding on CTA.  3. Any additional attempt to place a drain into the abscess is deferred due to the emergent need for hemostasis " and the risk of seeding the hematoma.  Plan:     Emergent angiography and embolization now with anesthesia (pain not adequately controlled by moderate sedation).     Above was discussed with Dr. Sampson and the patient's spouse, Hieu.  All are in agreement.     CTA A/P  Impression:     Retroperitoneal hematoma in the lateral conal fascia of the right iliac fossa abdomen with active extravasation.  No significant increase in size since the earlier CT.     Persistent enlarged heterogeneous multi fibroid uterus and tubular fluid-filled structure in the adnexa on the right compatible with hydro pyosalpinx.     No other acute finding evident within the abdomen or pelvis.    Assessment:      1. Tubo-ovarian abscess    2. Type 2 diabetes mellitus without complication, without long-term current use of insulin    3. Primary hypertension    4. TOA (tubo-ovarian abscess)      Plan:     Tubo-ovarian abscess  -Improving. Follow up with GYN next week. Will refill pain medication at lower dose today. ED precautions for fever, chills, n/v, bleeding.   -     oxyCODONE-acetaminophen (PERCOCET) 5-325 mg per tablet; Take 1 tablet by mouth every 4 (four) hours as needed for Pain.  Dispense: 15 tablet; Refill: 0    Type 2 diabetes mellitus without complication, without long-term current use of insulin  -Will discontinue Januvia and start Jardiance instead. Can hold insulin for now and monitor BG. Will plan to max out Ozempic and Jardiance prior to officially starting insulin. Once recovered from surgery can work more on lifestyle modification.  -     empagliflozin (JARDIANCE) 10 mg tablet; Take 1 tablet (10 mg total) by mouth once daily.  Dispense: 30 tablet; Refill: 1    Primary hypertension   -Controlled today on current medications. Continue.       Future Appointments   Date Time Provider Department Center   5/16/2023  2:30 PM Cirilo Sampson MD [facility] [place] [facility]   6/7/2023 10:20 AM [facility]  [place] [facility]

## 2023-05-15 ENCOUNTER — TELEPHONE (OUTPATIENT)
Dept: OBSTETRICS AND GYNECOLOGY | Facility: CLINIC | Age: 43
End: 2023-05-15
Payer: COMMERCIAL

## 2023-05-15 NOTE — TELEPHONE ENCOUNTER
----- Message from Brittany Joseph sent at 5/15/2023  8:12 AM CDT -----  Regarding: Jennifer[employer]  Contact: Jennifer[employer]/[phone]  Jennifer[employer] is requesting a call to confirm the patient's surgery and diagnosis to process her claim.   Fax: [phone]  Thanks

## 2023-05-16 ENCOUNTER — OFFICE VISIT (OUTPATIENT)
Dept: OBSTETRICS AND GYNECOLOGY | Facility: CLINIC | Age: 43
End: 2023-05-16
Payer: COMMERCIAL

## 2023-05-16 VITALS
DIASTOLIC BLOOD PRESSURE: 78 MMHG | WEIGHT: 216.13 LBS | SYSTOLIC BLOOD PRESSURE: 118 MMHG | BODY MASS INDEX: 37.09 KG/M2

## 2023-05-16 DIAGNOSIS — N70.93 TOA (TUBO-OVARIAN ABSCESS): Primary | ICD-10-CM

## 2023-05-16 DIAGNOSIS — A74.9 CHLAMYDIA INFECTION: ICD-10-CM

## 2023-05-16 PROCEDURE — 3046F HEMOGLOBIN A1C LEVEL >9.0%: CPT | Mod: CPTII,S$GLB,, | Performed by: OBSTETRICS & GYNECOLOGY

## 2023-05-16 PROCEDURE — 4010F ACE/ARB THERAPY RXD/TAKEN: CPT | Mod: CPTII,S$GLB,, | Performed by: OBSTETRICS & GYNECOLOGY

## 2023-05-16 PROCEDURE — 3008F PR BODY MASS INDEX (BMI) DOCUMENTED: ICD-10-PCS | Mod: CPTII,S$GLB,, | Performed by: OBSTETRICS & GYNECOLOGY

## 2023-05-16 PROCEDURE — 1160F PR REVIEW ALL MEDS BY PRESCRIBER/CLIN PHARMACIST DOCUMENTED: ICD-10-PCS | Mod: CPTII,S$GLB,, | Performed by: OBSTETRICS & GYNECOLOGY

## 2023-05-16 PROCEDURE — 3046F PR MOST RECENT HEMOGLOBIN A1C LEVEL > 9.0%: ICD-10-PCS | Mod: CPTII,S$GLB,, | Performed by: OBSTETRICS & GYNECOLOGY

## 2023-05-16 PROCEDURE — 3008F BODY MASS INDEX DOCD: CPT | Mod: CPTII,S$GLB,, | Performed by: OBSTETRICS & GYNECOLOGY

## 2023-05-16 PROCEDURE — 3074F PR MOST RECENT SYSTOLIC BLOOD PRESSURE < 130 MM HG: ICD-10-PCS | Mod: CPTII,S$GLB,, | Performed by: OBSTETRICS & GYNECOLOGY

## 2023-05-16 PROCEDURE — 1111F DSCHRG MED/CURRENT MED MERGE: CPT | Mod: CPTII,S$GLB,, | Performed by: OBSTETRICS & GYNECOLOGY

## 2023-05-16 PROCEDURE — 99213 PR OFFICE/OUTPT VISIT, EST, LEVL III, 20-29 MIN: ICD-10-PCS | Mod: S$GLB,,, | Performed by: OBSTETRICS & GYNECOLOGY

## 2023-05-16 PROCEDURE — 99999 PR PBB SHADOW E&M-EST. PATIENT-LVL III: ICD-10-PCS | Mod: PBBFAC,,, | Performed by: OBSTETRICS & GYNECOLOGY

## 2023-05-16 PROCEDURE — 4010F PR ACE/ARB THEARPY RXD/TAKEN: ICD-10-PCS | Mod: CPTII,S$GLB,, | Performed by: OBSTETRICS & GYNECOLOGY

## 2023-05-16 PROCEDURE — 1111F PR DISCHARGE MEDS RECONCILED W/ CURRENT OUTPATIENT MED LIST: ICD-10-PCS | Mod: CPTII,S$GLB,, | Performed by: OBSTETRICS & GYNECOLOGY

## 2023-05-16 PROCEDURE — 1159F PR MEDICATION LIST DOCUMENTED IN MEDICAL RECORD: ICD-10-PCS | Mod: CPTII,S$GLB,, | Performed by: OBSTETRICS & GYNECOLOGY

## 2023-05-16 PROCEDURE — 99999 PR PBB SHADOW E&M-EST. PATIENT-LVL III: CPT | Mod: PBBFAC,,, | Performed by: OBSTETRICS & GYNECOLOGY

## 2023-05-16 PROCEDURE — 1160F RVW MEDS BY RX/DR IN RCRD: CPT | Mod: CPTII,S$GLB,, | Performed by: OBSTETRICS & GYNECOLOGY

## 2023-05-16 PROCEDURE — 3074F SYST BP LT 130 MM HG: CPT | Mod: CPTII,S$GLB,, | Performed by: OBSTETRICS & GYNECOLOGY

## 2023-05-16 PROCEDURE — 99213 OFFICE O/P EST LOW 20 MIN: CPT | Mod: S$GLB,,, | Performed by: OBSTETRICS & GYNECOLOGY

## 2023-05-16 PROCEDURE — 1159F MED LIST DOCD IN RCRD: CPT | Mod: CPTII,S$GLB,, | Performed by: OBSTETRICS & GYNECOLOGY

## 2023-05-16 PROCEDURE — 3078F PR MOST RECENT DIASTOLIC BLOOD PRESSURE < 80 MM HG: ICD-10-PCS | Mod: CPTII,S$GLB,, | Performed by: OBSTETRICS & GYNECOLOGY

## 2023-05-16 PROCEDURE — 3078F DIAST BP <80 MM HG: CPT | Mod: CPTII,S$GLB,, | Performed by: OBSTETRICS & GYNECOLOGY

## 2023-05-16 NOTE — PROGRESS NOTES
CC:  Chief Complaint   Patient presents with    Post-op Evaluation       HPI:    43 y.o.   OB History    No obstetric history on file.       Complaining of: stomach pain and distension improving  Had TOA and +CT    (Not in a hospital admission)      Review of patient's allergies indicates:  No Known Allergies     Past Medical History:   Diagnosis Date    Diabetes mellitus     Hypertension      Past Surgical History:   Procedure Laterality Date    BREAST LUMPECTOMY Right     TUBAL LIGATION       History reviewed. No pertinent family history.  Social History     Tobacco Use    Smoking status: Never     Passive exposure: Never    Smokeless tobacco: Never   Substance Use Topics    Alcohol use: Never    Drug use: Never     ROS:  GENERAL: Feeling well overall. Denies fever or chills.   SKIN: Denies rash or lesions.   HEAD: Denies head injury or headache.   NODES: Denies enlarged lymph nodes.   CHEST: Denies chest pain or shortness of breath.   CARDIOVASCULAR: Denies palpitations or left sided chest pain.    ABDOMEN: Denies diarrhea, nausea, vomiting or rectal bleeding.   URINARY: No dysuria, hematuria, or burning on urination.  REPRODUCTIVE: See HPI.   BREASTS: Denies pain, lumps, or nipple discharge.   HEMATOLOGIC: No easy bruisability or excessive bleeding.   MUSCULOSKELETAL: Denies joint pain or swelling.   NEUROLOGIC: Denies syncope or weakness.   PSYCHIATRIC: Denies depression, anxiety or mood swings.      PE: /78   Wt 98 kg (216 lb 1.6 oz)   LMP 04/21/2023   BMI 37.09 kg/m²      APPEARANCE: Well nourished, well developed, in no acute distress.  SKIN: Normal skin turgor, no lesions.  NECK: Neck symmetric without masses or thyromegaly.  NODES: No inguinal, cervical, axillary or femoral lymph node enlargement.  CARDIOVASCULAR: Normal S1, S2. No rubs, murmurs or gallops.  NEUROLOGIC: Normal mood and affect. No depression or anxiety.   ABDOMEN: Soft. tenderness on rt side or masses. No hepatosplenomegaly. No  hernias.  RESPIRATORY: Normal respiratory effort with no retractions or use of accessory muscles.      ASSESSMENT/ PLAN    Chelsi was seen today for post-op evaluation.    Diagnoses and all orders for this visit:    TOA (tubo-ovarian abscess)        Cont abx  Cont ST disability  Rtc after finishe po abx for RENETTA    Cirilo Sampson MD

## 2023-05-18 ENCOUNTER — TELEPHONE (OUTPATIENT)
Dept: OBSTETRICS AND GYNECOLOGY | Facility: CLINIC | Age: 43
End: 2023-05-18
Payer: COMMERCIAL

## 2023-05-18 RX ORDER — METRONIDAZOLE 500 MG/1
500 TABLET ORAL EVERY 12 HOURS
Qty: 14 TABLET | Refills: 1 | Status: SHIPPED | OUTPATIENT
Start: 2023-05-18 | End: 2023-08-14

## 2023-05-18 RX ORDER — DOXYCYCLINE 100 MG/1
100 CAPSULE ORAL 2 TIMES DAILY
Qty: 14 CAPSULE | Refills: 1 | Status: SHIPPED | OUTPATIENT
Start: 2023-05-18 | End: 2023-08-17

## 2023-05-18 NOTE — TELEPHONE ENCOUNTER
Returned pt call and she informs us that she is in need of Flagyl and Monodox sent to [facility] in [facility]. Please advise

## 2023-05-18 NOTE — TELEPHONE ENCOUNTER
----- Message from Balbina Camarena sent at 5/18/2023 12:11 PM CDT -----  Contact: daughter  Type:  RX Refill Request    Who Called: pt daughter   Refill or New Rx:refill   RX Name and Strength:metroNIDAZOLE (FLAGYL) 500 MG tablet  How is the patient currently taking it? (ex. 1XDay):Take 1 tablet (500 mg total) by mouth every 12 (twelve) hours  Is this a 30 day or 90 day RX:28  [place] [place] [address] [address]   Phone: [phone]  Fax:  [phone]  Local or Mail Order:local   Ordering Provider:Therese   Would the patient rather a call back or a response via [facility]? Call   Best Call Back Number:[phone]  Additional Information:   Type:  RX Refill Request      RX Name and Strength:doxycycline (MONODOX) 100 MG capsule  How is the patient currently taking it? (ex. 1XDay):Take 1 capsule (100 mg total) by mouth 2 (two) times lucy  Is this a 30 day or 90 day RX:28

## 2023-05-18 NOTE — TELEPHONE ENCOUNTER
Contacted pt and informed she only need the medication for 1 more week, rx sent to pharmacy. Patient verbalized understanding.

## 2023-05-30 ENCOUNTER — OFFICE VISIT (OUTPATIENT)
Dept: OBSTETRICS AND GYNECOLOGY | Facility: CLINIC | Age: 43
End: 2023-05-30
Payer: COMMERCIAL

## 2023-05-30 VITALS
DIASTOLIC BLOOD PRESSURE: 79 MMHG | SYSTOLIC BLOOD PRESSURE: 112 MMHG | BODY MASS INDEX: 36.69 KG/M2 | WEIGHT: 213.75 LBS

## 2023-05-30 DIAGNOSIS — N70.93 TOA (TUBO-OVARIAN ABSCESS): ICD-10-CM

## 2023-05-30 DIAGNOSIS — Z86.19 HISTORY OF CHLAMYDIA: Primary | ICD-10-CM

## 2023-05-30 PROCEDURE — 99999 PR PBB SHADOW E&M-EST. PATIENT-LVL III: ICD-10-PCS | Mod: PBBFAC,,, | Performed by: OBSTETRICS & GYNECOLOGY

## 2023-05-30 PROCEDURE — 3046F HEMOGLOBIN A1C LEVEL >9.0%: CPT | Mod: CPTII,S$GLB,, | Performed by: OBSTETRICS & GYNECOLOGY

## 2023-05-30 PROCEDURE — 1111F PR DISCHARGE MEDS RECONCILED W/ CURRENT OUTPATIENT MED LIST: ICD-10-PCS | Mod: CPTII,S$GLB,, | Performed by: OBSTETRICS & GYNECOLOGY

## 2023-05-30 PROCEDURE — 3046F PR MOST RECENT HEMOGLOBIN A1C LEVEL > 9.0%: ICD-10-PCS | Mod: CPTII,S$GLB,, | Performed by: OBSTETRICS & GYNECOLOGY

## 2023-05-30 PROCEDURE — 3074F SYST BP LT 130 MM HG: CPT | Mod: CPTII,S$GLB,, | Performed by: OBSTETRICS & GYNECOLOGY

## 2023-05-30 PROCEDURE — 1160F PR REVIEW ALL MEDS BY PRESCRIBER/CLIN PHARMACIST DOCUMENTED: ICD-10-PCS | Mod: CPTII,S$GLB,, | Performed by: OBSTETRICS & GYNECOLOGY

## 2023-05-30 PROCEDURE — 1159F PR MEDICATION LIST DOCUMENTED IN MEDICAL RECORD: ICD-10-PCS | Mod: CPTII,S$GLB,, | Performed by: OBSTETRICS & GYNECOLOGY

## 2023-05-30 PROCEDURE — 99213 OFFICE O/P EST LOW 20 MIN: CPT | Mod: S$GLB,,, | Performed by: OBSTETRICS & GYNECOLOGY

## 2023-05-30 PROCEDURE — 1159F MED LIST DOCD IN RCRD: CPT | Mod: CPTII,S$GLB,, | Performed by: OBSTETRICS & GYNECOLOGY

## 2023-05-30 PROCEDURE — 87591 N.GONORRHOEAE DNA AMP PROB: CPT

## 2023-05-30 PROCEDURE — 1111F DSCHRG MED/CURRENT MED MERGE: CPT | Mod: CPTII,S$GLB,, | Performed by: OBSTETRICS & GYNECOLOGY

## 2023-05-30 PROCEDURE — 3008F BODY MASS INDEX DOCD: CPT | Mod: CPTII,S$GLB,, | Performed by: OBSTETRICS & GYNECOLOGY

## 2023-05-30 PROCEDURE — 87491 CHLMYD TRACH DNA AMP PROBE: CPT

## 2023-05-30 PROCEDURE — 1160F RVW MEDS BY RX/DR IN RCRD: CPT | Mod: CPTII,S$GLB,, | Performed by: OBSTETRICS & GYNECOLOGY

## 2023-05-30 PROCEDURE — 99999 PR PBB SHADOW E&M-EST. PATIENT-LVL III: CPT | Mod: PBBFAC,,, | Performed by: OBSTETRICS & GYNECOLOGY

## 2023-05-30 PROCEDURE — 3008F PR BODY MASS INDEX (BMI) DOCUMENTED: ICD-10-PCS | Mod: CPTII,S$GLB,, | Performed by: OBSTETRICS & GYNECOLOGY

## 2023-05-30 PROCEDURE — 4010F PR ACE/ARB THEARPY RXD/TAKEN: ICD-10-PCS | Mod: CPTII,S$GLB,, | Performed by: OBSTETRICS & GYNECOLOGY

## 2023-05-30 PROCEDURE — 3078F PR MOST RECENT DIASTOLIC BLOOD PRESSURE < 80 MM HG: ICD-10-PCS | Mod: CPTII,S$GLB,, | Performed by: OBSTETRICS & GYNECOLOGY

## 2023-05-30 PROCEDURE — 3078F DIAST BP <80 MM HG: CPT | Mod: CPTII,S$GLB,, | Performed by: OBSTETRICS & GYNECOLOGY

## 2023-05-30 PROCEDURE — 4010F ACE/ARB THERAPY RXD/TAKEN: CPT | Mod: CPTII,S$GLB,, | Performed by: OBSTETRICS & GYNECOLOGY

## 2023-05-30 PROCEDURE — 99213 PR OFFICE/OUTPT VISIT, EST, LEVL III, 20-29 MIN: ICD-10-PCS | Mod: S$GLB,,, | Performed by: OBSTETRICS & GYNECOLOGY

## 2023-05-30 PROCEDURE — 3074F PR MOST RECENT SYSTOLIC BLOOD PRESSURE < 130 MM HG: ICD-10-PCS | Mod: CPTII,S$GLB,, | Performed by: OBSTETRICS & GYNECOLOGY

## 2023-05-30 NOTE — PROGRESS NOTES
CC: Follow up    SUBJECTIVE:   43 y.o. female No obstetric history on file. Here today for follow up. Patient with history of TOA and +GC. Recently prescribed course of metronidazole and doxycycline. Patient was able to finish the course. Patient denies new symptoms today. Abdominal pain continuing to decrease per patient.  Patient's last menstrual period was 04/22/2023 (approximate)..  She states she is overall close to her baseline.        Past Medical History:   Diagnosis Date    Diabetes mellitus     Hypertension      Past Surgical History:   Procedure Laterality Date    BREAST LUMPECTOMY Right     TUBAL LIGATION       Social History     Socioeconomic History    Marital status:    Tobacco Use    Smoking status: Never     Passive exposure: Never    Smokeless tobacco: Never   Substance and Sexual Activity    Alcohol use: Never    Drug use: Never    Sexual activity: Yes     Partners: Male     History reviewed. No pertinent family history.  OB History   No obstetric history on file.         Current Outpatient Medications   Medication Sig Dispense Refill    ALBUTEROL INHL Inhale 2 puffs into the lungs as needed.      amLODIPine (NORVASC) 5 MG tablet Take 1 tablet (5 mg total) by mouth once daily. 90 tablet 3    doxycycline (MONODOX) 100 MG capsule Take 1 capsule (100 mg total) by mouth 2 (two) times daily. 14 capsule 1    elagolix-estradiol-norethindrn 300-1-0.5mg(AM) /300 mg(PM) CpSQ Take 1 tablet by mouth 2 (two) times a day. 56 capsule 10    empagliflozin (JARDIANCE) 10 mg tablet Take 1 tablet (10 mg total) by mouth once daily. 30 tablet 1    insulin glargine U-300 conc (TOUJEO MAX U-300 SOLOSTAR) 300 unit/mL (3 mL) insulin pen Inject 10 Units into the skin once daily.      losartan (COZAAR) 25 MG tablet Take 2 tablets (50 mg total) by mouth once daily. 90 tablet 6    metFORMIN (GLUCOPHAGE) 1000 MG tablet Take 1,000 mg by mouth 2 (two) times daily with meals.      metroNIDAZOLE (FLAGYL) 500 MG tablet Take 1  tablet (500 mg total) by mouth every 12 (twelve) hours. 14 tablet 1    ondansetron (ZOFRAN) 4 MG tablet Take 1 tablet (4 mg total) by mouth daily as needed for Nausea. 30 tablet 1    oxyCODONE-acetaminophen (PERCOCET) 5-325 mg per tablet Take 1 tablet by mouth every 4 (four) hours as needed for Pain. 15 tablet 0    semaglutide (OZEMPIC) 0.25 mg or 0.5 mg (2 mg/3 mL) pen injector Inject 0.5 mg into the skin every 7 days. 3 mL 1     No current facility-administered medications for this visit.     Allergies: Patient has no known allergies.     ROS:  Constitutional: no weight loss, weight gain, fever, fatigue  Eyes:  No vision changes, glasses/contacts  ENT/Mouth: No ulcers, sinus problems, ears ringing, headache  Cardiovascular: No inability to lie flat, chest pain, exercise intolerance, swelling, heart palpitations  Respiratory: No wheezing, coughing blood, shortness of breath, or cough  Gastrointestinal: No diarrhea, bloody stool, nausea/vomiting, constipation, gas, hemorrhoids  Genitourinary: No blood in urine, painful urination, urgency of urination, frequency of urination, incomplete emptying, incontinence, abnormal bleeding, painful periods, heavy periods, vaginal discharge, vaginal odor, painful intercourse, sexual problems, bleeding after intercourse.  Musculoskeletal: No muscle weakness  Skin/Breast: No painful breasts, nipple discharge, masses, rash, ulcers  Neurological: No passing out, seizures, numbness, headache  Endocrine: No diabetes, hypothyroid, hyperthyroid, hot flashes, hair loss, abnormal hair growth, ance  Psychiatric: No depression, crying  Hematologic: No bruises, bleeding, swollen lymph nodes, anemia.      OBJECTIVE:   The patient appears well, alert, oriented x 3, in no distress.  /79   Wt 97 kg (213 lb 11.8 oz)   LMP 04/22/2023 (Approximate)   BMI 36.69 kg/m²   NECK: negative, no thyromegaly, trachea midline  SKIN: normal and no acne, striae, hirsutism  BREAST EXAM: breasts appear  normal, no suspicious masses, no skin or nipple changes or axillary nodes  ABDOMEN: soft, non-tender; bowel sounds normal; no masses,  no organomegaly and no hernias, masses, or hepatosplenomegaly  GENITALIA: normal external genitalia, no erythema, no discharge  URETHRA: normal appearing urethra with no masses, tenderness or lesions and normal urethra, normal urethral meatus  VAGINA: Normal  CERVIX: no lesions or cervical motion tenderness  UTERUS: not examined  ADNEXA: not evaluated      ASSESSMENT:   Patient doing well and able to complete full abx course  1. History of chlamydia    2. TOA (tubo-ovarian abscess)        PLAN:   GC/CT RENETTA today  additional lab tests per orders  return annually or prn  Orders Placed This Encounter    C. trachomatis/N. gonorrhoeae by AMP [facility]; Cervix

## 2023-06-01 LAB
C TRACH DNA SPEC QL NAA+PROBE: NOT DETECTED
N GONORRHOEA DNA SPEC QL NAA+PROBE: NOT DETECTED

## 2023-06-06 DIAGNOSIS — E11.9 TYPE 2 DIABETES MELLITUS WITHOUT COMPLICATION, WITHOUT LONG-TERM CURRENT USE OF INSULIN: ICD-10-CM

## 2023-06-07 ENCOUNTER — OFFICE VISIT (OUTPATIENT)
Dept: FAMILY MEDICINE | Facility: HOSPITAL | Age: 43
End: 2023-06-07
Payer: COMMERCIAL

## 2023-06-07 VITALS
BODY MASS INDEX: 37.04 KG/M2 | WEIGHT: 216.94 LBS | SYSTOLIC BLOOD PRESSURE: 122 MMHG | HEIGHT: 64 IN | DIASTOLIC BLOOD PRESSURE: 88 MMHG | HEART RATE: 75 BPM

## 2023-06-07 DIAGNOSIS — Z00.01 ENCOUNTER FOR ANNUAL GENERAL MEDICAL EXAMINATION WITH ABNORMAL FINDINGS IN ADULT: ICD-10-CM

## 2023-06-07 DIAGNOSIS — E11.9 TYPE 2 DIABETES MELLITUS WITHOUT COMPLICATION, WITHOUT LONG-TERM CURRENT USE OF INSULIN: Primary | ICD-10-CM

## 2023-06-07 DIAGNOSIS — Z11.59 ENCOUNTER FOR HEPATITIS C SCREENING TEST FOR LOW RISK PATIENT: ICD-10-CM

## 2023-06-07 DIAGNOSIS — Z12.39 ENCOUNTER FOR SCREENING FOR MALIGNANT NEOPLASM OF BREAST, UNSPECIFIED SCREENING MODALITY: ICD-10-CM

## 2023-06-07 PROCEDURE — 99214 OFFICE O/P EST MOD 30 MIN: CPT

## 2023-06-07 NOTE — PROGRESS NOTES
[facility]  History & Physical    SUBJECTIVE:     Chief Complaint:   Chief Complaint   Patient presents with    Establish Care       History of Present Illness:  43 y.o. female who  has a past medical history of Diabetes mellitus, tubo-ovarian abscess complicated by right iliac femoral circumflex extravasation and Hypertension. presents to clinic today to establish care.  Hospitalization for tubo-ovarian abscess complicated IV extravasation requiring 2 units of blood transfusion and ICU level care.  Patient currently endorses she feels well since previous hospitalization.  Endorses compliance to medication regimen for elevated blood pressure and diabetes type 2.  Patient is to be seen by Ophthalmology as patient cancelled  appointment.  Patient seen recently at gynecology clinic in [facility] for follow-up of tubo-ovarian abscess.  At that time patient recently finished course of doxycycline and Flagyl.  Repeat testing for gonorrhea chlamydia that time negative.  Patient currently not sexually active at this in time.  Continues with prior partner.  Prior partner treated with antibiotic regimen for gonorrhea and chlamydia.      Allergies:  Review of patient's allergies indicates:  No Known Allergies    Home Medications:  Current Outpatient Medications on File Prior to Visit   Medication Sig    ALBUTEROL INHL Inhale 2 puffs into the lungs as needed.    amLODIPine (NORVASC) 5 MG tablet Take 1 tablet (5 mg total) by mouth once daily.    doxycycline (MONODOX) 100 MG capsule Take 1 capsule (100 mg total) by mouth 2 (two) times daily.    elagolix-estradiol-norethindrn 300-1-0.5mg(AM) /300 mg(PM) CpSQ Take 1 tablet by mouth 2 (two) times a day.    insulin glargine U-300 conc (TOUJEO MAX U-300 SOLOSTAR) 300 unit/mL (3 mL) insulin pen Inject 10 Units into the skin once daily.    losartan (COZAAR) 25 MG tablet Take 2 tablets (50 mg total) by mouth once daily.    metFORMIN (GLUCOPHAGE) 1000 MG tablet Take 1,000 mg by  mouth 2 (two) times daily with meals.    metroNIDAZOLE (FLAGYL) 500 MG tablet Take 1 tablet (500 mg total) by mouth every 12 (twelve) hours.    ondansetron (ZOFRAN) 4 MG tablet Take 1 tablet (4 mg total) by mouth daily as needed for Nausea.    oxyCODONE-acetaminophen (PERCOCET) 5-325 mg per tablet Take 1 tablet by mouth every 4 (four) hours as needed for Pain.    semaglutide (OZEMPIC) 0.25 mg or 0.5 mg (2 mg/3 mL) pen injector Inject 0.5 mg into the skin every 7 days.    empagliflozin (JARDIANCE) 10 mg tablet Take 1 tablet (10 mg total) by mouth once daily. (Patient not taking: Reported on 6/7/2023)     No current facility-administered medications on file prior to visit.       Past Medical History:   Diagnosis Date    Diabetes mellitus     Hypertension      Past Surgical History:   Procedure Laterality Date    BREAST LUMPECTOMY Right     TUBAL LIGATION       No family history on file.  Social History     Tobacco Use    Smoking status: Never     Passive exposure: Never    Smokeless tobacco: Never   Substance Use Topics    Alcohol use: Never    Drug use: Never        Review of Systems   Constitutional:  Negative for chills and fever.   HENT:  Negative for congestion and sore throat.    Eyes:  Negative for blurred vision.   Respiratory:  Negative for cough, shortness of breath and wheezing.    Cardiovascular:  Negative for chest pain and leg swelling.   Gastrointestinal:  Negative for abdominal pain, nausea and vomiting.   Genitourinary:  Negative for dysuria.   Musculoskeletal:  Negative for joint pain and myalgias.   Skin:  Negative for rash.   Neurological:  Negative for dizziness and headaches.      OBJECTIVE:     Vital Signs:  Pulse: 75 (06/07/23 1013)  BP: 122/88 (06/07/23 1013)    Physical Exam  Constitutional:       Appearance: She is obese.   HENT:      Head: Normocephalic and atraumatic.      Mouth/Throat:      Pharynx: Oropharynx is clear.   Eyes:      Extraocular Movements: Extraocular movements intact.       Pupils: Pupils are equal, round, and reactive to light.   Cardiovascular:      Rate and Rhythm: Normal rate and regular rhythm.   Pulmonary:      Effort: Pulmonary effort is normal.      Breath sounds: Normal breath sounds.   Abdominal:      General: Abdomen is flat. Bowel sounds are normal.      Palpations: Abdomen is soft.   Musculoskeletal:         General: Normal range of motion.      Cervical back: Normal range of motion and neck supple.   Skin:     General: Skin is warm and dry.   Neurological:      General: No focal deficit present.      Mental Status: She is alert and oriented to person, place, and time.   Psychiatric:         Mood and Affect: Mood normal.         Behavior: Behavior normal.     Laboratory:  Hemoglobin A1C   Date Value Ref Range Status   04/28/2023 10.0 (H) 4.0 - 5.6 % Final     Comment:     ADA Screening Guidelines:  5.7-6.4%  Consistent with prediabetes  >or=6.5%  Consistent with diabetes    High levels of fetal hemoglobin interfere with the HbA1C  assay. Heterozygous hemoglobin variants (HbS, HgC, etc)do  not significantly interfere with this assay.   However, presence of multiple variants may affect accuracy.         A/P:  Chelsi was seen today for establish care.    Diagnoses and all orders for this visit:    Type 2 diabetes mellitus without complication, without long-term current use of insulin  -     Cancel: Microalbumin/creatinine urine ratio  -     Microalbumin/creatinine urine ratio; Future    BMI 37.0-37.9, adult  -     Lipid Panel; Future  -     TSH; Future  -Will start patient on lipid-lowering therapy during following appointment as patient may require moderate instead low-dose statin treatment.    Encounter for screening for malignant neoplasm of breast, unspecified screening modality  -     Mammo Digital Screening Bilat; Future    Encounter for hepatitis C screening test for low risk patient  -     Hepatitis C Antibody; Future    Encounter for annual general medical  examination with abnormal findings in adult  -     HIV 1/2 Ag/Ab (4th Gen); Future  -reviewed lab results visits to gynecology patient negative for chlamydia and gonorrhea.  Counseled patient importance of barrier protection to avoid sexually transmitted diseases.  Patient verbalized understanding.  All questions        Follow up in about 2 months (around 8/7/2023).    Follow-up in 2 months to discuss lab results.  Patient will need A1c to be done during follow-up appointment.    Pia Valenzuela MD  [facility], PGY-1  06/07/2023

## 2023-06-08 NOTE — PROGRESS NOTES
I assume primary medical responsibility for this patient. I have reviewed the history, physical, and assessment & treatment plan with the resident and agree that the care is reasonable and necessary. This service has been performed by a resident without the presence of a teaching physician under the primary care exception. If necessary, an addendum of additional findings or evaluation beyond the resident documentation will be noted below.        Kalen Mancilla Jr., DO    [facility]

## 2023-06-14 ENCOUNTER — HOSPITAL ENCOUNTER (OUTPATIENT)
Dept: RADIOLOGY | Facility: HOSPITAL | Age: 43
Discharge: HOME | End: 2023-06-14
Payer: COMMERCIAL

## 2023-06-14 DIAGNOSIS — Z12.39 ENCOUNTER FOR SCREENING FOR MALIGNANT NEOPLASM OF BREAST, UNSPECIFIED SCREENING MODALITY: ICD-10-CM

## 2023-06-14 PROCEDURE — 77067 SCR MAMMO BI INCL CAD: CPT | Mod: 26,,, | Performed by: RADIOLOGY

## 2023-06-14 PROCEDURE — 77063 MAMMO DIGITAL SCREENING BILAT WITH TOMO: ICD-10-PCS | Mod: 26,,, | Performed by: RADIOLOGY

## 2023-06-14 PROCEDURE — 77067 SCR MAMMO BI INCL CAD: CPT | Mod: TC,PN

## 2023-06-14 PROCEDURE — 77063 BREAST TOMOSYNTHESIS BI: CPT | Mod: 26,,, | Performed by: RADIOLOGY

## 2023-06-14 PROCEDURE — 77067 MAMMO DIGITAL SCREENING BILAT WITH TOMO: ICD-10-PCS | Mod: 26,,, | Performed by: RADIOLOGY

## 2023-08-14 ENCOUNTER — OFFICE VISIT (OUTPATIENT)
Dept: FAMILY MEDICINE | Facility: HOSPITAL | Age: 43
End: 2023-08-14
Payer: COMMERCIAL

## 2023-08-14 VITALS
DIASTOLIC BLOOD PRESSURE: 88 MMHG | BODY MASS INDEX: 38.6 KG/M2 | HEART RATE: 93 BPM | SYSTOLIC BLOOD PRESSURE: 124 MMHG | WEIGHT: 224.88 LBS

## 2023-08-14 DIAGNOSIS — Z12.11 ENCOUNTER FOR SCREENING FOR MALIGNANT NEOPLASM OF COLON: ICD-10-CM

## 2023-08-14 DIAGNOSIS — E11.9 TYPE 2 DIABETES MELLITUS WITHOUT COMPLICATION, WITHOUT LONG-TERM CURRENT USE OF INSULIN: Primary | ICD-10-CM

## 2023-08-14 PROCEDURE — 99213 OFFICE O/P EST LOW 20 MIN: CPT

## 2023-08-14 RX ORDER — SEMAGLUTIDE 1.34 MG/ML
1 INJECTION, SOLUTION SUBCUTANEOUS
Qty: 3 ML | Refills: 11 | Status: SHIPPED | OUTPATIENT
Start: 2023-08-14 | End: 2024-02-07 | Stop reason: DRUGHIGH

## 2023-08-15 ENCOUNTER — LAB VISIT (OUTPATIENT)
Dept: LAB | Facility: HOSPITAL | Age: 43
End: 2023-08-15
Payer: COMMERCIAL

## 2023-08-15 DIAGNOSIS — E11.9 TYPE 2 DIABETES MELLITUS WITHOUT COMPLICATION, WITHOUT LONG-TERM CURRENT USE OF INSULIN: ICD-10-CM

## 2023-08-15 LAB
ESTIMATED AVG GLUCOSE: 226 MG/DL (ref 68–131)
HBA1C MFR BLD: 9.5 % (ref 4–5.6)

## 2023-08-15 PROCEDURE — 36415 COLL VENOUS BLD VENIPUNCTURE: CPT

## 2023-08-15 PROCEDURE — 83036 HEMOGLOBIN GLYCOSYLATED A1C: CPT

## 2023-08-15 NOTE — PROGRESS NOTES
[facility]  History & Physical    SUBJECTIVE:     Chief Complaint:   Chief Complaint   Patient presents with    Follow-up     DIABETES       History of Present Illness:  43 y.o. female who  has a past medical history of Diabetes mellitus and Hypertension. presents to clinic today for follow up on blood glucose. Currently on Ozempic 0.5  for the past two months and Jardiance 10 mg. Currently not taking Metformin.Endorses fasting BG in 150-180's. Denies any side effects with medications. Recent urine/microalbuminuria ratio unremarkable. No A1C on file. Patient requesting refill on Ozempic.     Allergies:  Review of patient's allergies indicates:  No Known Allergies    Home Medications:  Current Outpatient Medications on File Prior to Visit   Medication Sig    ALBUTEROL INHL Inhale 2 puffs into the lungs as needed.    amLODIPine (NORVASC) 5 MG tablet Take 1 tablet (5 mg total) by mouth once daily.    doxycycline (MONODOX) 100 MG capsule Take 1 capsule (100 mg total) by mouth 2 (two) times daily.    elagolix-estradiol-norethindrn 300-1-0.5mg(AM) /300 mg(PM) CpSQ Take 1 tablet by mouth 2 (two) times a day.    empagliflozin (JARDIANCE) 10 mg tablet Take 1 tablet (10 mg total) by mouth once daily.    losartan (COZAAR) 25 MG tablet Take 2 tablets (50 mg total) by mouth once daily.    metFORMIN (GLUCOPHAGE) 1000 MG tablet Take 1,000 mg by mouth 2 (two) times daily with meals.    ondansetron (ZOFRAN) 4 MG tablet Take 1 tablet (4 mg total) by mouth daily as needed for Nausea.     No current facility-administered medications on file prior to visit.       Past Medical History:   Diagnosis Date    Diabetes mellitus     Hypertension      Past Surgical History:   Procedure Laterality Date    BREAST LUMPECTOMY Right 2009    benign    TUBAL LIGATION       No family history on file.  Social History     Tobacco Use    Smoking status: Never     Passive exposure: Never    Smokeless tobacco: Never   Substance Use Topics     Alcohol use: Never    Drug use: Never        Review of Systems   Constitutional:  Negative for chills and fever.   HENT:  Negative for congestion and sore throat.    Eyes:  Negative for blurred vision.   Respiratory:  Negative for cough, shortness of breath and wheezing.    Cardiovascular:  Negative for chest pain and leg swelling.   Gastrointestinal:  Negative for abdominal pain, nausea and vomiting.   Genitourinary:  Negative for dysuria.   Musculoskeletal:  Negative for joint pain and myalgias.   Skin:  Negative for rash.   Neurological:  Negative for dizziness and headaches.      OBJECTIVE:     Vital Signs:  Pulse: 93 (08/14/23 1419)  BP: 124/88 (08/14/23 1419)    Physical Exam  Constitutional:       Appearance: Normal appearance. She is obese.   HENT:      Head: Normocephalic and atraumatic.      Mouth/Throat:      Pharynx: Oropharynx is clear.   Eyes:      Extraocular Movements: Extraocular movements intact.      Pupils: Pupils are equal, round, and reactive to light.   Cardiovascular:      Rate and Rhythm: Normal rate and regular rhythm.   Pulmonary:      Effort: Pulmonary effort is normal.      Breath sounds: Normal breath sounds.   Abdominal:      General: Abdomen is flat. Bowel sounds are normal.      Palpations: Abdomen is soft.   Musculoskeletal:         General: Normal range of motion.      Cervical back: Normal range of motion and neck supple.   Skin:     General: Skin is warm and dry.   Neurological:      General: No focal deficit present.      Mental Status: She is alert and oriented to person, place, and time.   Psychiatric:         Mood and Affect: Mood normal.         Behavior: Behavior normal.     Laboratory:  Hemoglobin A1C   Date Value Ref Range Status   08/15/2023 9.5 (H) 4.0 - 5.6 % Final     Comment:     ADA Screening Guidelines:  5.7-6.4%  Consistent with prediabetes  >or=6.5%  Consistent with diabetes    High levels of fetal hemoglobin interfere with the HbA1C  assay. Heterozygous  hemoglobin variants (HbS, HgC, etc)do  not significantly interfere with this assay.   However, presence of multiple variants may affect accuracy.     04/28/2023 10.0 (H) 4.0 - 5.6 % Final     Comment:     ADA Screening Guidelines:  5.7-6.4%  Consistent with prediabetes  >or=6.5%  Consistent with diabetes    High levels of fetal hemoglobin interfere with the HbA1C  assay. Heterozygous hemoglobin variants (HbS, HgC, etc)do  not significantly interfere with this assay.   However, presence of multiple variants may affect accuracy.         A/P:  Chelsi was seen today for follow-up.    Diagnoses and all orders for this visit:    Type 2 diabetes mellitus without complication, without long-term current use of insulin  -     Hemoglobin A1C; Future  -     semaglutide (OZEMPIC) 1 mg/dose (4 mg/3 mL); Inject 1 mg into the skin every 7 days.    Encounter for screening for malignant neoplasm of colon  -     Case Request Endoscopy: COLONOSCOPY    Discussed lab results with patient. Recommend lifestyle modifications at this point in time based on ASCVD score.     Follow up in about 1 month (around 9/14/2023).        Pia Valenzuela MD  [facility], PGY-2  08/15/2023

## 2023-08-17 ENCOUNTER — OFFICE VISIT (OUTPATIENT)
Dept: OBSTETRICS AND GYNECOLOGY | Facility: CLINIC | Age: 43
End: 2023-08-17
Payer: COMMERCIAL

## 2023-08-17 VITALS — DIASTOLIC BLOOD PRESSURE: 89 MMHG | BODY MASS INDEX: 38.28 KG/M2 | SYSTOLIC BLOOD PRESSURE: 139 MMHG | WEIGHT: 223 LBS

## 2023-08-17 DIAGNOSIS — N70.93 TUBO-OVARIAN ABSCESS: Primary | ICD-10-CM

## 2023-08-17 DIAGNOSIS — Z01.419 WELL WOMAN EXAM WITH ROUTINE GYNECOLOGICAL EXAM: ICD-10-CM

## 2023-08-17 DIAGNOSIS — D21.9 FIBROIDS: ICD-10-CM

## 2023-08-17 DIAGNOSIS — R10.2 PELVIC PAIN IN FEMALE: ICD-10-CM

## 2023-08-17 PROCEDURE — 3046F PR MOST RECENT HEMOGLOBIN A1C LEVEL > 9.0%: ICD-10-PCS | Mod: CPTII,S$GLB,, | Performed by: OBSTETRICS & GYNECOLOGY

## 2023-08-17 PROCEDURE — 3061F NEG MICROALBUMINURIA REV: CPT | Mod: CPTII,S$GLB,, | Performed by: OBSTETRICS & GYNECOLOGY

## 2023-08-17 PROCEDURE — 3075F SYST BP GE 130 - 139MM HG: CPT | Mod: CPTII,S$GLB,, | Performed by: OBSTETRICS & GYNECOLOGY

## 2023-08-17 PROCEDURE — 3066F NEPHROPATHY DOC TX: CPT | Mod: CPTII,S$GLB,, | Performed by: OBSTETRICS & GYNECOLOGY

## 2023-08-17 PROCEDURE — 87591 N.GONORRHOEAE DNA AMP PROB: CPT | Performed by: OBSTETRICS & GYNECOLOGY

## 2023-08-17 PROCEDURE — 81514 NFCT DS BV&VAGINITIS DNA ALG: CPT | Performed by: OBSTETRICS & GYNECOLOGY

## 2023-08-17 PROCEDURE — 3046F HEMOGLOBIN A1C LEVEL >9.0%: CPT | Mod: CPTII,S$GLB,, | Performed by: OBSTETRICS & GYNECOLOGY

## 2023-08-17 PROCEDURE — 99396 PREV VISIT EST AGE 40-64: CPT | Mod: S$GLB,,, | Performed by: OBSTETRICS & GYNECOLOGY

## 2023-08-17 PROCEDURE — 4010F ACE/ARB THERAPY RXD/TAKEN: CPT | Mod: CPTII,S$GLB,, | Performed by: OBSTETRICS & GYNECOLOGY

## 2023-08-17 PROCEDURE — 99999 PR PBB SHADOW E&M-EST. PATIENT-LVL III: CPT | Mod: PBBFAC,,, | Performed by: OBSTETRICS & GYNECOLOGY

## 2023-08-17 PROCEDURE — 3061F PR NEG MICROALBUMINURIA RESULT DOCUMENTED/REVIEW: ICD-10-PCS | Mod: CPTII,S$GLB,, | Performed by: OBSTETRICS & GYNECOLOGY

## 2023-08-17 PROCEDURE — 4010F PR ACE/ARB THEARPY RXD/TAKEN: ICD-10-PCS | Mod: CPTII,S$GLB,, | Performed by: OBSTETRICS & GYNECOLOGY

## 2023-08-17 PROCEDURE — 1160F RVW MEDS BY RX/DR IN RCRD: CPT | Mod: CPTII,S$GLB,, | Performed by: OBSTETRICS & GYNECOLOGY

## 2023-08-17 PROCEDURE — 3008F BODY MASS INDEX DOCD: CPT | Mod: CPTII,S$GLB,, | Performed by: OBSTETRICS & GYNECOLOGY

## 2023-08-17 PROCEDURE — 3079F PR MOST RECENT DIASTOLIC BLOOD PRESSURE 80-89 MM HG: ICD-10-PCS | Mod: CPTII,S$GLB,, | Performed by: OBSTETRICS & GYNECOLOGY

## 2023-08-17 PROCEDURE — 3008F PR BODY MASS INDEX (BMI) DOCUMENTED: ICD-10-PCS | Mod: CPTII,S$GLB,, | Performed by: OBSTETRICS & GYNECOLOGY

## 2023-08-17 PROCEDURE — 1159F PR MEDICATION LIST DOCUMENTED IN MEDICAL RECORD: ICD-10-PCS | Mod: CPTII,S$GLB,, | Performed by: OBSTETRICS & GYNECOLOGY

## 2023-08-17 PROCEDURE — 99999 PR PBB SHADOW E&M-EST. PATIENT-LVL III: ICD-10-PCS | Mod: PBBFAC,,, | Performed by: OBSTETRICS & GYNECOLOGY

## 2023-08-17 PROCEDURE — 1160F PR REVIEW ALL MEDS BY PRESCRIBER/CLIN PHARMACIST DOCUMENTED: ICD-10-PCS | Mod: CPTII,S$GLB,, | Performed by: OBSTETRICS & GYNECOLOGY

## 2023-08-17 PROCEDURE — 1159F MED LIST DOCD IN RCRD: CPT | Mod: CPTII,S$GLB,, | Performed by: OBSTETRICS & GYNECOLOGY

## 2023-08-17 PROCEDURE — 3075F PR MOST RECENT SYSTOLIC BLOOD PRESS GE 130-139MM HG: ICD-10-PCS | Mod: CPTII,S$GLB,, | Performed by: OBSTETRICS & GYNECOLOGY

## 2023-08-17 PROCEDURE — 87491 CHLMYD TRACH DNA AMP PROBE: CPT | Performed by: OBSTETRICS & GYNECOLOGY

## 2023-08-17 PROCEDURE — 3079F DIAST BP 80-89 MM HG: CPT | Mod: CPTII,S$GLB,, | Performed by: OBSTETRICS & GYNECOLOGY

## 2023-08-17 PROCEDURE — 88175 CYTOPATH C/V AUTO FLUID REDO: CPT | Performed by: OBSTETRICS & GYNECOLOGY

## 2023-08-17 PROCEDURE — 99396 PR PREVENTIVE VISIT,EST,40-64: ICD-10-PCS | Mod: S$GLB,,, | Performed by: OBSTETRICS & GYNECOLOGY

## 2023-08-17 PROCEDURE — 3066F PR DOCUMENTATION OF TREATMENT FOR NEPHROPATHY: ICD-10-PCS | Mod: CPTII,S$GLB,, | Performed by: OBSTETRICS & GYNECOLOGY

## 2023-08-17 RX ORDER — METRONIDAZOLE 500 MG/1
500 TABLET ORAL EVERY 12 HOURS
Qty: 14 TABLET | Refills: 0 | Status: SHIPPED | OUTPATIENT
Start: 2023-08-17 | End: 2023-10-06

## 2023-08-17 RX ORDER — IBUPROFEN 600 MG/1
600 TABLET ORAL EVERY 6 HOURS PRN
Qty: 60 TABLET | Refills: 2 | Status: ON HOLD | OUTPATIENT
Start: 2023-08-17 | End: 2023-09-13 | Stop reason: HOSPADM

## 2023-08-17 RX ORDER — OXYCODONE AND ACETAMINOPHEN 5; 325 MG/1; MG/1
1 TABLET ORAL EVERY 4 HOURS PRN
Qty: 20 TABLET | Refills: 0 | Status: ON HOLD | OUTPATIENT
Start: 2023-08-17 | End: 2023-09-13 | Stop reason: HOSPADM

## 2023-08-17 RX ORDER — DOXYCYCLINE 100 MG/1
100 CAPSULE ORAL 2 TIMES DAILY
Qty: 20 CAPSULE | Refills: 0 | Status: SHIPPED | OUTPATIENT
Start: 2023-08-17 | End: 2023-10-06

## 2023-08-17 NOTE — PROGRESS NOTES
CC: Annual check-up    SUBJECTIVE:   43 y.o. female   for annual routine Pap and checkup. Patient's last menstrual period was 2013 (approximate)..  She complains of abdominal pain in the right lower quadrant. For past week and getting worse, similar to last time was admitted for Rt TOA and had vascular injury during IR attempt to obtain cx specimen        Past Medical History:   Diagnosis Date    Diabetes mellitus     Hypertension      Past Surgical History:   Procedure Laterality Date    BREAST LUMPECTOMY Right 2009    benign    TUBAL LIGATION       Social History     Socioeconomic History    Marital status:    Tobacco Use    Smoking status: Never     Passive exposure: Never    Smokeless tobacco: Never   Substance and Sexual Activity    Alcohol use: Never    Drug use: Never    Sexual activity: Yes     Partners: Male     History reviewed. No pertinent family history.  OB History    Para Term  AB Living   4 4 4         SAB IAB Ectopic Multiple Live Births                  # Outcome Date GA Lbr Frank/2nd Weight Sex Delivery Anes PTL Lv   4 Term            3 Term            2 Term            1 Term                  Current Outpatient Medications   Medication Sig Dispense Refill    amLODIPine (NORVASC) 5 MG tablet Take 1 tablet (5 mg total) by mouth once daily. 90 tablet 3    elagolix-estradiol-norethindrn 300-1-0.5mg(AM) /300 mg(PM) CpSQ Take 1 tablet by mouth 2 (two) times a day. 56 capsule 10    empagliflozin (JARDIANCE) 10 mg tablet Take 1 tablet (10 mg total) by mouth once daily. 30 tablet 1    losartan (COZAAR) 25 MG tablet Take 2 tablets (50 mg total) by mouth once daily. 90 tablet 6    metFORMIN (GLUCOPHAGE) 1000 MG tablet Take 1,000 mg by mouth 2 (two) times daily with meals.      semaglutide (OZEMPIC) 1 mg/dose (4 mg/3 mL) Inject 1 mg into the skin every 7 days. 3 mL 11    ALBUTEROL INHL Inhale 2 puffs into the lungs as needed.      doxycycline (VIBRAMYCIN) 100 MG Cap Take 1  capsule (100 mg total) by mouth 2 (two) times daily. 20 capsule 0    ibuprofen (ADVIL,MOTRIN) 600 MG tablet Take 1 tablet (600 mg total) by mouth every 6 (six) hours as needed for Pain. 60 tablet 2    metroNIDAZOLE (FLAGYL) 500 MG tablet Take 1 tablet (500 mg total) by mouth every 12 (twelve) hours. 14 tablet 0    ondansetron (ZOFRAN) 4 MG tablet Take 1 tablet (4 mg total) by mouth daily as needed for Nausea. (Patient not taking: Reported on 8/17/2023) 30 tablet 1    oxyCODONE-acetaminophen (PERCOCET) 5-325 mg per tablet Take 1 tablet by mouth every 4 (four) hours as needed for Pain. 20 tablet 0     No current facility-administered medications for this visit.     Allergies: Super bees     ROS:  Constitutional: no weight loss, weight gain, fever, fatigue  Eyes:  No vision changes, glasses/contacts  ENT/Mouth: No ulcers, sinus problems, ears ringing, headache  Cardiovascular: No inability to lie flat, chest pain, exercise intolerance, swelling, heart palpitations  Respiratory: No wheezing, coughing blood, shortness of breath, or cough  Gastrointestinal: No diarrhea, bloody stool, nausea/vomiting, constipation, gas, hemorrhoids  Genitourinary: No blood in urine, painful urination, urgency of urination, frequency of urination, incomplete emptying, incontinence, abnormal bleeding, painful periods, heavy periods, vaginal discharge, vaginal odor, painful intercourse, sexual problems, bleeding after intercourse.  Musculoskeletal: No muscle weakness  Skin/Breast: No painful breasts, nipple discharge, masses, rash, ulcers  Neurological: No passing out, seizures, numbness, headache  Endocrine: No diabetes, hypothyroid, hyperthyroid, hot flashes, hair loss, abnormal hair growth, ance  Psychiatric: No depression, crying  Hematologic: No bruises, bleeding, swollen lymph nodes, anemia.      OBJECTIVE:   The patient appears well, alert, oriented x 3, in no distress.  /89   Wt 101.2 kg (223 lb)   LMP 04/23/2013 (Approximate)    BMI 38.28 kg/m²   NECK: no thyromegaly, trachea midline  SKIN: no acne, striae, hirsutism  BREAST EXAM: not examined  ABDOMEN: no hernias, masses, or hepatosplenomegaly  GENITALIA: normal external genitalia, no erythema, no discharge  URETHRA: normal urethra, normal urethral meatus  VAGINA: blood present  CERVIX: no lesions or cervical motion tenderness  UTERUS: enlarged, 16 weeks size  ADNEXA: positive for: enlargement, fullness, mass, and tenderness in rt adnexa      ASSESSMENT:   well woman  1. Tubo-ovarian abscess    2. Fibroids    3. Pelvic pain in female    4. Well woman exam with routine gynecological exam        PLAN:   mammogram  pap smear  additional lab tests per orders  return annually or prn  Orders Placed This Encounter    VAGINOSIS SCREEN BY DNA PROBE    C. trachomatis/N. gonorrhoeae by AMP DNA    Mammo Digital Screening Bilat w/ Vasu    US Pelvis Complete Non OB    Liquid-Based Pap Smear, Screening    ibuprofen (ADVIL,MOTRIN) 600 MG tablet    oxyCODONE-acetaminophen (PERCOCET) 5-325 mg per tablet    metroNIDAZOLE (FLAGYL) 500 MG tablet    doxycycline (VIBRAMYCIN) 100 MG Cap   Will f/u on imaging and likely refer to Gyn onc to discuss hyst

## 2023-08-18 LAB
C TRACH DNA SPEC QL NAA+PROBE: NOT DETECTED
N GONORRHOEA DNA SPEC QL NAA+PROBE: NOT DETECTED

## 2023-08-19 LAB
BACTERIAL VAGINOSIS DNA: POSITIVE
CANDIDA GLABRATA DNA: NEGATIVE
CANDIDA KRUSEI DNA: NEGATIVE
CANDIDA RRNA VAG QL PROBE: NEGATIVE
T VAGINALIS RRNA GENITAL QL PROBE: NEGATIVE

## 2023-08-21 ENCOUNTER — TELEPHONE (OUTPATIENT)
Dept: OBSTETRICS AND GYNECOLOGY | Facility: CLINIC | Age: 43
End: 2023-08-21
Payer: COMMERCIAL

## 2023-08-22 ENCOUNTER — PATIENT MESSAGE (OUTPATIENT)
Dept: OBSTETRICS AND GYNECOLOGY | Facility: CLINIC | Age: 43
End: 2023-08-22
Payer: COMMERCIAL

## 2023-08-22 ENCOUNTER — TELEPHONE (OUTPATIENT)
Dept: GYNECOLOGIC ONCOLOGY | Facility: CLINIC | Age: 43
End: 2023-08-22
Payer: COMMERCIAL

## 2023-08-22 NOTE — TELEPHONE ENCOUNTER
Please review u/s from 08/18. Patient is requesting your recommendation so that she can let her job know.

## 2023-08-22 NOTE — TELEPHONE ENCOUNTER
Recommend she see gyn onc for Hysterectomy.  Give her # to contact them for eval and procedure  Dr. Overton, esvin Jansen etc

## 2023-08-22 NOTE — TELEPHONE ENCOUNTER
----- Message from Malinda Dunn sent at 8/22/2023  2:46 PM CDT -----  Regarding: Appointment  Name of Who is Calling:  Patient          What is the request in detail:  Patient stated Dr. Cirilo Sampson was sending a referral for an appointment. Please give patient a call            Can the clinic reply by [facility]: Yes            What Number to Call Back if not in [facility]:[phone]

## 2023-08-22 NOTE — TELEPHONE ENCOUNTER
Spoke with our patient about her insurance, schedule appointment she voiced understanding of the date, time and location. All questions answered . Provider Scheduling Coord.  Gynecologic Oncology MA/PAR /Preceptor Marcell Cox

## 2023-08-24 ENCOUNTER — TELEPHONE (OUTPATIENT)
Dept: GYNECOLOGIC ONCOLOGY | Facility: CLINIC | Age: 43
End: 2023-08-24
Payer: COMMERCIAL

## 2023-08-24 ENCOUNTER — OFFICE VISIT (OUTPATIENT)
Dept: GYNECOLOGIC ONCOLOGY | Facility: CLINIC | Age: 43
End: 2023-08-24
Payer: COMMERCIAL

## 2023-08-24 ENCOUNTER — PATIENT MESSAGE (OUTPATIENT)
Dept: FAMILY MEDICINE | Facility: HOSPITAL | Age: 43
End: 2023-08-24
Payer: COMMERCIAL

## 2023-08-24 VITALS
SYSTOLIC BLOOD PRESSURE: 126 MMHG | HEART RATE: 87 BPM | BODY MASS INDEX: 37.48 KG/M2 | WEIGHT: 219.56 LBS | DIASTOLIC BLOOD PRESSURE: 80 MMHG | HEIGHT: 64 IN

## 2023-08-24 DIAGNOSIS — N93.9 ABNORMAL UTERINE BLEEDING (AUB): Primary | ICD-10-CM

## 2023-08-24 DIAGNOSIS — D25.1 INTRAMURAL AND SUBSEROUS LEIOMYOMA OF UTERUS: ICD-10-CM

## 2023-08-24 DIAGNOSIS — E66.9 OBESITY (BMI 35.0-39.9 WITHOUT COMORBIDITY): ICD-10-CM

## 2023-08-24 DIAGNOSIS — D25.2 INTRAMURAL AND SUBSEROUS LEIOMYOMA OF UTERUS: ICD-10-CM

## 2023-08-24 DIAGNOSIS — N93.9 ABNORMAL UTERINE BLEEDING: ICD-10-CM

## 2023-08-24 DIAGNOSIS — D21.9 FIBROIDS: ICD-10-CM

## 2023-08-24 DIAGNOSIS — R10.2 PELVIC PAIN: ICD-10-CM

## 2023-08-24 PROCEDURE — 3061F NEG MICROALBUMINURIA REV: CPT | Mod: CPTII,S$GLB,, | Performed by: STUDENT IN AN ORGANIZED HEALTH CARE EDUCATION/TRAINING PROGRAM

## 2023-08-24 PROCEDURE — 88305 TISSUE EXAM BY PATHOLOGIST: ICD-10-PCS | Mod: 26,,, | Performed by: PATHOLOGY

## 2023-08-24 PROCEDURE — 3061F PR NEG MICROALBUMINURIA RESULT DOCUMENTED/REVIEW: ICD-10-PCS | Mod: CPTII,S$GLB,, | Performed by: STUDENT IN AN ORGANIZED HEALTH CARE EDUCATION/TRAINING PROGRAM

## 2023-08-24 PROCEDURE — 99999 PR PBB SHADOW E&M-EST. PATIENT-LVL III: ICD-10-PCS | Mod: PBBFAC,,, | Performed by: STUDENT IN AN ORGANIZED HEALTH CARE EDUCATION/TRAINING PROGRAM

## 2023-08-24 PROCEDURE — 4010F PR ACE/ARB THEARPY RXD/TAKEN: ICD-10-PCS | Mod: CPTII,S$GLB,, | Performed by: STUDENT IN AN ORGANIZED HEALTH CARE EDUCATION/TRAINING PROGRAM

## 2023-08-24 PROCEDURE — 58100 PR BIOPSY OF UTERUS LINING: ICD-10-PCS | Mod: S$GLB,,, | Performed by: STUDENT IN AN ORGANIZED HEALTH CARE EDUCATION/TRAINING PROGRAM

## 2023-08-24 PROCEDURE — 88305 TISSUE EXAM BY PATHOLOGIST: CPT | Mod: 26,,, | Performed by: PATHOLOGY

## 2023-08-24 PROCEDURE — 3079F PR MOST RECENT DIASTOLIC BLOOD PRESSURE 80-89 MM HG: ICD-10-PCS | Mod: CPTII,S$GLB,, | Performed by: STUDENT IN AN ORGANIZED HEALTH CARE EDUCATION/TRAINING PROGRAM

## 2023-08-24 PROCEDURE — 3046F PR MOST RECENT HEMOGLOBIN A1C LEVEL > 9.0%: ICD-10-PCS | Mod: CPTII,S$GLB,, | Performed by: STUDENT IN AN ORGANIZED HEALTH CARE EDUCATION/TRAINING PROGRAM

## 2023-08-24 PROCEDURE — 88305 TISSUE EXAM BY PATHOLOGIST: CPT | Performed by: PATHOLOGY

## 2023-08-24 PROCEDURE — 3074F PR MOST RECENT SYSTOLIC BLOOD PRESSURE < 130 MM HG: ICD-10-PCS | Mod: CPTII,S$GLB,, | Performed by: STUDENT IN AN ORGANIZED HEALTH CARE EDUCATION/TRAINING PROGRAM

## 2023-08-24 PROCEDURE — 3066F PR DOCUMENTATION OF TREATMENT FOR NEPHROPATHY: ICD-10-PCS | Mod: CPTII,S$GLB,, | Performed by: STUDENT IN AN ORGANIZED HEALTH CARE EDUCATION/TRAINING PROGRAM

## 2023-08-24 PROCEDURE — 3008F PR BODY MASS INDEX (BMI) DOCUMENTED: ICD-10-PCS | Mod: CPTII,S$GLB,, | Performed by: STUDENT IN AN ORGANIZED HEALTH CARE EDUCATION/TRAINING PROGRAM

## 2023-08-24 PROCEDURE — 99205 OFFICE O/P NEW HI 60 MIN: CPT | Mod: 25,S$GLB,, | Performed by: STUDENT IN AN ORGANIZED HEALTH CARE EDUCATION/TRAINING PROGRAM

## 2023-08-24 PROCEDURE — 99205 PR OFFICE/OUTPT VISIT, NEW, LEVL V, 60-74 MIN: ICD-10-PCS | Mod: 25,S$GLB,, | Performed by: STUDENT IN AN ORGANIZED HEALTH CARE EDUCATION/TRAINING PROGRAM

## 2023-08-24 PROCEDURE — 3046F HEMOGLOBIN A1C LEVEL >9.0%: CPT | Mod: CPTII,S$GLB,, | Performed by: STUDENT IN AN ORGANIZED HEALTH CARE EDUCATION/TRAINING PROGRAM

## 2023-08-24 PROCEDURE — 3074F SYST BP LT 130 MM HG: CPT | Mod: CPTII,S$GLB,, | Performed by: STUDENT IN AN ORGANIZED HEALTH CARE EDUCATION/TRAINING PROGRAM

## 2023-08-24 PROCEDURE — 3008F BODY MASS INDEX DOCD: CPT | Mod: CPTII,S$GLB,, | Performed by: STUDENT IN AN ORGANIZED HEALTH CARE EDUCATION/TRAINING PROGRAM

## 2023-08-24 PROCEDURE — 58100 BIOPSY OF UTERUS LINING: CPT | Mod: S$GLB,,, | Performed by: STUDENT IN AN ORGANIZED HEALTH CARE EDUCATION/TRAINING PROGRAM

## 2023-08-24 PROCEDURE — 3066F NEPHROPATHY DOC TX: CPT | Mod: CPTII,S$GLB,, | Performed by: STUDENT IN AN ORGANIZED HEALTH CARE EDUCATION/TRAINING PROGRAM

## 2023-08-24 PROCEDURE — 4010F ACE/ARB THERAPY RXD/TAKEN: CPT | Mod: CPTII,S$GLB,, | Performed by: STUDENT IN AN ORGANIZED HEALTH CARE EDUCATION/TRAINING PROGRAM

## 2023-08-24 PROCEDURE — 99999 PR PBB SHADOW E&M-EST. PATIENT-LVL III: CPT | Mod: PBBFAC,,, | Performed by: STUDENT IN AN ORGANIZED HEALTH CARE EDUCATION/TRAINING PROGRAM

## 2023-08-24 PROCEDURE — 3079F DIAST BP 80-89 MM HG: CPT | Mod: CPTII,S$GLB,, | Performed by: STUDENT IN AN ORGANIZED HEALTH CARE EDUCATION/TRAINING PROGRAM

## 2023-08-24 NOTE — Clinical Note
Thanks for sending Callie to see me. We're planning for surgery in a couple of weeks.   I'm always happy to get patients in quickly when you have someone you'd like to refer.  Austin

## 2023-08-24 NOTE — H&P (VIEW-ONLY)
Referring Provider:  Carson Sampson MD  [address] [facility]  EMERGENCY [place] [id number]   Subjective:      Patient ID: Chelsi Johnson is a 43 y.o. female.    Chief Complaint: No chief complaint on file.    Problem List Items Addressed This Visit          Renal/    Abnormal uterine bleeding       Oncology    Intramural and subserous leiomyoma of uterus     Other Visit Diagnoses       Abnormal uterine bleeding (AUB)    -  Primary    Relevant Orders    Specimen to Pathology, Ob/Gyn    Obesity (BMI 35.0-39.9 without comorbidity) [E66.9]               HPI Long history of abnormal uterine bleeding and pelvic pain. Reports heavy menstrual bleeding and intermenstrual bleeding. Underwent endometrial ablation 5 years ago. Had several months of decreased bleeding, but now bleeding is heavy and passing large blood clots. Has attempted OCPs and depo in the past without improvement. Also reports pelvic pain requiring narcotic pain medications. Reports constipation and early satiety. No history of abnormal pap. No abdominal surgeries.    Treated for a TOA on the right ovary 4/2023. Attempted drain placement resulted in an arterial injury that was repaired intravascularly.    Review of Systems   Constitutional:  Negative for chills, fatigue and fever.   Respiratory:  Negative for cough and shortness of breath.    Cardiovascular:  Negative for chest pain.   Gastrointestinal:  Positive for abdominal pain and constipation. Negative for abdominal distention, diarrhea, nausea and vomiting.   Genitourinary:  Positive for pelvic pain and vaginal bleeding. Negative for dysuria.   Musculoskeletal:  Negative for back pain.   Psychiatric/Behavioral:  Negative for dysphoric mood. The patient is not nervous/anxious.      Past Medical History:   Diagnosis Date    Diabetes mellitus     Hypertension       Past Surgical History:   Procedure Laterality Date    BREAST LUMPECTOMY Right 2009    benign    TUBAL LIGATION         History reviewed. No pertinent family history.   Social History     Socioeconomic History    Marital status:         Objective:      Vitals:    08/24/23 0917   BP: 126/80   Pulse: 87      Physical Exam  Constitutional:       General: She is not in acute distress.  HENT:      Head: Normocephalic.   Eyes:      Extraocular Movements: Extraocular movements intact.      Conjunctiva/sclera: Conjunctivae normal.   Cardiovascular:      Rate and Rhythm: Normal rate.      Pulses: Normal pulses.   Pulmonary:      Effort: Pulmonary effort is normal. No respiratory distress.      Breath sounds: No wheezing.   Abdominal:      General: There is no distension.      Tenderness: There is no abdominal tenderness. There is no guarding or rebound.   Genitourinary:     Comments: External genitalia normal. Vagina normal. Cervix with no visible lesions. Endometrial biopsy performed. Uterus wide and bulky but mobile extending to umbilicus.  Exam limited by body habitus.     Musculoskeletal:         General: No deformity.   Neurological:      Mental Status: She is alert and oriented to person, place, and time.   Psychiatric:         Mood and Affect: Mood normal.         Behavior: Behavior normal.         Thought Content: Thought content normal.         Lab Results   Component Value Date    WBC 9.61 05/02/2023    HGB 10.2 (L) 05/02/2023    HCT 31.3 (L) 05/02/2023    MCV 88 05/02/2023     05/02/2023        Assessment:       Abnormal uterine bleeding (AUB)  -     Specimen to Pathology, Ob/Gyn    Obesity (BMI 35.0-39.9 without comorbidity) [E66.9]    Abnormal uterine bleeding    Intramural and subserous leiomyoma of uterus         Plan:       AUB: Reviewed potential etiologies of AUB as well as her past treatment history. Medical management and endometrial ablation have failed to control her symptoms. We reviewed options for surgical management given her enlarged fibroid uterus as well as pelvic pain and bulk symptoms.  Endometrial biopsy obtained to exclude hyperplasia or malignancy. She strongly desires to proceed with hysterectomy. Discussed plan for robotic hysterectomy. Reviewed options for ovarian management. Given history of prior TOA, based on intraoperative findings, a right oophorectomy may be indicated. Plan to remove bilateral tubes for ovarian cancer risk reduction. She is perimenopausal, but desires perservation of one ovary if possible. Reviewed risks of surgery including bleeding, infection, heart complication, blood clots, adjacent organ injury, and conversion to laparotomy. Reviewed that the size of her uterus and medical history increase the risks of conversion to laparotomy, which carries increased risks of perioperative complications.  Follow up EMB results  Plan for robotic hysterectomy, BS, possible USO, possible BSO at [facility] 9/13/23.    2. Pelvic pain: reviewed fibroid uterus and history of TOA as possible etiologies of current pelvic pain, but relayed that if pain is from other causes surgery may not improve her symptoms. However, given the size of her uterus and presence of bulk symptoms, I'm optimistic that surgery will help.    3. Diabetes: currently taking Ozempic weekly. Advised to hold 1 week before surgery.    As part of the medical decision making process I reviewed the referring provides notes, relevant labs, imaging reports and independently interpreted the  CT A/P performed on 4/28/23 and US performed on 8/18/23.        Austin Ku MD

## 2023-08-24 NOTE — PROGRESS NOTES
Referring Provider:  Carson Sampson MD  [address] [facility]  EMERGENCY [place] [id number]   Subjective:      Patient ID: Chelsi Johnson is a 43 y.o. female.    Chief Complaint: No chief complaint on file.    Problem List Items Addressed This Visit          Renal/    Abnormal uterine bleeding       Oncology    Intramural and subserous leiomyoma of uterus     Other Visit Diagnoses       Abnormal uterine bleeding (AUB)    -  Primary    Relevant Orders    Specimen to Pathology, Ob/Gyn    Obesity (BMI 35.0-39.9 without comorbidity) [E66.9]               HPI Long history of abnormal uterine bleeding and pelvic pain. Reports heavy menstrual bleeding and intermenstrual bleeding. Underwent endometrial ablation 5 years ago. Had several months of decreased bleeding, but now bleeding is heavy and passing large blood clots. Has attempted OCPs and depo in the past without improvement. Also reports pelvic pain requiring narcotic pain medications. Reports constipation and early satiety. No history of abnormal pap. No abdominal surgeries.    Treated for a TOA on the right ovary 4/2023. Attempted drain placement resulted in an arterial injury that was repaired intravascularly.    Review of Systems   Constitutional:  Negative for chills, fatigue and fever.   Respiratory:  Negative for cough and shortness of breath.    Cardiovascular:  Negative for chest pain.   Gastrointestinal:  Positive for abdominal pain and constipation. Negative for abdominal distention, diarrhea, nausea and vomiting.   Genitourinary:  Positive for pelvic pain and vaginal bleeding. Negative for dysuria.   Musculoskeletal:  Negative for back pain.   Psychiatric/Behavioral:  Negative for dysphoric mood. The patient is not nervous/anxious.      Past Medical History:   Diagnosis Date    Diabetes mellitus     Hypertension       Past Surgical History:   Procedure Laterality Date    BREAST LUMPECTOMY Right 2009    benign    TUBAL LIGATION         History reviewed. No pertinent family history.   Social History     Socioeconomic History    Marital status:         Objective:      Vitals:    08/24/23 0917   BP: 126/80   Pulse: 87      Physical Exam  Constitutional:       General: She is not in acute distress.  HENT:      Head: Normocephalic.   Eyes:      Extraocular Movements: Extraocular movements intact.      Conjunctiva/sclera: Conjunctivae normal.   Cardiovascular:      Rate and Rhythm: Normal rate.      Pulses: Normal pulses.   Pulmonary:      Effort: Pulmonary effort is normal. No respiratory distress.      Breath sounds: No wheezing.   Abdominal:      General: There is no distension.      Tenderness: There is no abdominal tenderness. There is no guarding or rebound.   Genitourinary:     Comments: External genitalia normal. Vagina normal. Cervix with no visible lesions. Endometrial biopsy performed. Uterus wide and bulky but mobile extending to umbilicus.  Exam limited by body habitus.     Musculoskeletal:         General: No deformity.   Neurological:      Mental Status: She is alert and oriented to person, place, and time.   Psychiatric:         Mood and Affect: Mood normal.         Behavior: Behavior normal.         Thought Content: Thought content normal.         Lab Results   Component Value Date    WBC 9.61 05/02/2023    HGB 10.2 (L) 05/02/2023    HCT 31.3 (L) 05/02/2023    MCV 88 05/02/2023     05/02/2023        Assessment:       Abnormal uterine bleeding (AUB)  -     Specimen to Pathology, Ob/Gyn    Obesity (BMI 35.0-39.9 without comorbidity) [E66.9]    Abnormal uterine bleeding    Intramural and subserous leiomyoma of uterus         Plan:       AUB: Reviewed potential etiologies of AUB as well as her past treatment history. Medical management and endometrial ablation have failed to control her symptoms. We reviewed options for surgical management given her enlarged fibroid uterus as well as pelvic pain and bulk symptoms.  Endometrial biopsy obtained to exclude hyperplasia or malignancy. She strongly desires to proceed with hysterectomy. Discussed plan for robotic hysterectomy. Reviewed options for ovarian management. Given history of prior TOA, based on intraoperative findings, a right oophorectomy may be indicated. Plan to remove bilateral tubes for ovarian cancer risk reduction. She is perimenopausal, but desires perservation of one ovary if possible. Reviewed risks of surgery including bleeding, infection, heart complication, blood clots, adjacent organ injury, and conversion to laparotomy. Reviewed that the size of her uterus and medical history increase the risks of conversion to laparotomy, which carries increased risks of perioperative complications.  Follow up EMB results  Plan for robotic hysterectomy, BS, possible USO, possible BSO at [facility] 9/13/23.    2. Pelvic pain: reviewed fibroid uterus and history of TOA as possible etiologies of current pelvic pain, but relayed that if pain is from other causes surgery may not improve her symptoms. However, given the size of her uterus and presence of bulk symptoms, I'm optimistic that surgery will help.    3. Diabetes: currently taking Ozempic weekly. Advised to hold 1 week before surgery.    As part of the medical decision making process I reviewed the referring provides notes, relevant labs, imaging reports and independently interpreted the  CT A/P performed on 4/28/23 and US performed on 8/18/23.        Austin Ku MD

## 2023-08-25 LAB
FINAL PATHOLOGIC DIAGNOSIS: NORMAL
Lab: NORMAL

## 2023-08-26 NOTE — PROGRESS NOTES
I assume primary medical responsibility for this patient. I have reviewed the history, physical, and assessement & treatment plan with the resident and agree that the care is reasonable and necessary. This service has been performed by a resident without the presence of a teaching physician under the primary care exception. If necessary, an addendum of additional findings or evaluation beyond the resident documentation will be noted below.      Brii Solitario MD    [facility]

## 2023-08-28 LAB
FINAL PATHOLOGIC DIAGNOSIS: NORMAL
GROSS: NORMAL
Lab: NORMAL

## 2023-09-01 ENCOUNTER — ANESTHESIA EVENT (OUTPATIENT)
Dept: SURGERY | Facility: OTHER | Age: 43
End: 2023-09-01
Payer: COMMERCIAL

## 2023-09-06 ENCOUNTER — HOSPITAL ENCOUNTER (OUTPATIENT)
Dept: PREADMISSION TESTING | Facility: OTHER | Age: 43
Discharge: HOME | End: 2023-09-06
Attending: STUDENT IN AN ORGANIZED HEALTH CARE EDUCATION/TRAINING PROGRAM
Payer: COMMERCIAL

## 2023-09-06 VITALS
OXYGEN SATURATION: 99 % | BODY MASS INDEX: 37.48 KG/M2 | HEART RATE: 83 BPM | SYSTOLIC BLOOD PRESSURE: 134 MMHG | HEIGHT: 64 IN | DIASTOLIC BLOOD PRESSURE: 76 MMHG | WEIGHT: 219.56 LBS | TEMPERATURE: 98 F

## 2023-09-06 DIAGNOSIS — Z01.818 PRE-OP TESTING: Primary | ICD-10-CM

## 2023-09-06 LAB
ABO + RH BLD: NORMAL
ANION GAP SERPL CALC-SCNC: 10 MMOL/L (ref 8–16)
BASOPHILS # BLD AUTO: 0.02 K/UL (ref 0–0.2)
BASOPHILS NFR BLD: 0.3 % (ref 0–1.9)
BLD GP AB SCN CELLS X3 SERPL QL: NORMAL
BUN SERPL-MCNC: 6 MG/DL (ref 6–20)
CALCIUM SERPL-MCNC: 9 MG/DL (ref 8.7–10.5)
CHLORIDE SERPL-SCNC: 103 MMOL/L (ref 95–110)
CO2 SERPL-SCNC: 23 MMOL/L (ref 23–29)
CREAT SERPL-MCNC: 0.7 MG/DL (ref 0.5–1.4)
DIFFERENTIAL METHOD: ABNORMAL
EOSINOPHIL # BLD AUTO: 0.1 K/UL (ref 0–0.5)
EOSINOPHIL NFR BLD: 1.4 % (ref 0–8)
ERYTHROCYTE [DISTWIDTH] IN BLOOD BY AUTOMATED COUNT: 13.2 % (ref 11.5–14.5)
EST. GFR  (NO RACE VARIABLE): >60 ML/MIN/1.73 M^2
GLUCOSE SERPL-MCNC: 216 MG/DL (ref 70–110)
HCT VFR BLD AUTO: 41.3 % (ref 37–48.5)
HGB BLD-MCNC: 13.1 G/DL (ref 12–16)
IMM GRANULOCYTES # BLD AUTO: 0.02 K/UL (ref 0–0.04)
IMM GRANULOCYTES NFR BLD AUTO: 0.3 % (ref 0–0.5)
LYMPHOCYTES # BLD AUTO: 2.9 K/UL (ref 1–4.8)
LYMPHOCYTES NFR BLD: 40.4 % (ref 18–48)
MCH RBC QN AUTO: 28.3 PG (ref 27–31)
MCHC RBC AUTO-ENTMCNC: 31.7 G/DL (ref 32–36)
MCV RBC AUTO: 89 FL (ref 82–98)
MONOCYTES # BLD AUTO: 0.4 K/UL (ref 0.3–1)
MONOCYTES NFR BLD: 5.8 % (ref 4–15)
NEUTROPHILS # BLD AUTO: 3.8 K/UL (ref 1.8–7.7)
NEUTROPHILS NFR BLD: 51.8 % (ref 38–73)
NRBC BLD-RTO: 0 /100 WBC
PLATELET # BLD AUTO: 260 K/UL (ref 150–450)
PMV BLD AUTO: 10.1 FL (ref 9.2–12.9)
POTASSIUM SERPL-SCNC: 4 MMOL/L (ref 3.5–5.1)
RBC # BLD AUTO: 4.63 M/UL (ref 4–5.4)
SODIUM SERPL-SCNC: 136 MMOL/L (ref 136–145)
SPECIMEN OUTDATE: NORMAL
WBC # BLD AUTO: 7.28 K/UL (ref 3.9–12.7)

## 2023-09-06 PROCEDURE — 85025 COMPLETE CBC W/AUTO DIFF WBC: CPT | Performed by: ANESTHESIOLOGY

## 2023-09-06 PROCEDURE — 80048 BASIC METABOLIC PNL TOTAL CA: CPT | Performed by: ANESTHESIOLOGY

## 2023-09-06 PROCEDURE — 86901 BLOOD TYPING SEROLOGIC RH(D): CPT | Performed by: ANESTHESIOLOGY

## 2023-09-06 PROCEDURE — 36415 COLL VENOUS BLD VENIPUNCTURE: CPT | Performed by: ANESTHESIOLOGY

## 2023-09-06 RX ORDER — LIDOCAINE HYDROCHLORIDE 10 MG/ML
0.5 INJECTION, SOLUTION EPIDURAL; INFILTRATION; INTRACAUDAL; PERINEURAL ONCE
Status: CANCELLED | OUTPATIENT
Start: 2023-09-06 | End: 2023-09-06

## 2023-09-06 RX ORDER — ACETAMINOPHEN 500 MG
1000 TABLET ORAL
Status: CANCELLED | OUTPATIENT
Start: 2023-09-06 | End: 2023-09-06

## 2023-09-06 RX ORDER — SODIUM CHLORIDE, SODIUM LACTATE, POTASSIUM CHLORIDE, CALCIUM CHLORIDE 600; 310; 30; 20 MG/100ML; MG/100ML; MG/100ML; MG/100ML
INJECTION, SOLUTION INTRAVENOUS CONTINUOUS
Status: CANCELLED | OUTPATIENT
Start: 2023-09-06

## 2023-09-06 NOTE — DISCHARGE INSTRUCTIONS
Information to Prepare you for your Surgery    PRE-ADMIT TESTING -  [phone]    [address]          Your surgery has been scheduled at [facility]. We are pleased to have the opportunity to serve you. For Further Information please call [phone].    On the day of surgery please report to the Information Desk on the 1st floor.    CONTACT YOUR PHYSICIAN'S OFFICE THE DAY PRIOR TO YOUR SURGERY TO OBTAIN YOUR ARRIVAL TIME.     The evening before surgery do not eat anything after 9 p.m. ( this includes hard candy, chewing gum and mints).  You may only have GATORADE, POWERADE AND WATER  from 9 p.m. until you leave your home.   DO NOT DRINK ANY LIQUIDS ON THE WAY TO THE HOSPITAL.      Why does your anesthesiologist allow you to drink Gatorade/Powerade before surgery?  Gatorade/Powerade helps to increase your comfort before surgery and to decrease your nausea after surgery. The carbohydrates in Gatorade/Powerade help reduce your body's stress response to surgery.  If you are a diabetic-drink only water prior to surgery.    Outpatient Surgery- May allow 2 adult Support Persons (1 being the designated ) for all surgical/procedural patients. A breastfeeding mother will be allowed her infant (6 months and under) and 2 adult Support Persons.       SPECIAL MEDICATION INSTRUCTIONS: TAKE medications checked off by the Anesthesiologist on your Medication List.    Angiogram Patients: Take medications as instructed by your physician, including aspirin.     Surgery Patients:    If you take ASPIRIN - Your PHYSICIAN/SURGEON will need to inform you IF/OR when you need to stop taking aspirin prior to your surgery.     The week prior to surgery do not ot take any medications containing IBUPROFEN or NSAIDS ( Advil, Motrin, Goodys, BC, Aleve, Naproxen etc) If you are not sure if you should take a medicine please call your surgeon's office.  Ok to take  Tylenol    Do Not Wear any make-up (especially eye make-up) to surgery. Please remove any false eyelashes or eyelash extensions. If you arrive the day of surgery with makeup/eyelashes on you will be required to remove prior to surgery. (There is a risk of corneal abrasions if eye makeup/eyelash extensions are not removed)      Leave all valuables at home.   Do Not wear any jewelry or watches, including any metal in body piercings. Jewelry must be removed prior to coming to the hospital.  There is a possibility that rings that are unable to be removed may be cut off if they are on the surgical extremity.    Please remove all hair extensions, wigs, clips and any other metal accessories/ ornaments from your hair.  These items may pose a flammable/fire risk in Surgery and must be removed.    Do not shave your surgical area at least 5 days prior to your surgery. The surgical prep will be performed at the hospital according to Infection Control regulations.    Contact Lens must be removed before surgery. Either do not wear the contact lens or bring a case and solution for storage.  Please bring a container for eyeglasses or dentures as required.  Bring any paperwork your physician has provided, such as consent forms,  history and physicals, doctor's orders, etc.   Bring comfortable clothes that are loose fitting to wear upon discharge. Take into consideration the type of surgery being performed.  Maintain your diet as advised per your physician the day prior to surgery.      Adequate rest the night before surgery is advised.   Park in the Parking lot behind the hospital or in the [facility] across the street from the parking lot. Parking is complimentary.  If you will be discharged the same day as your procedure, please arrange for a responsible adult to drive you home or to accompany you if traveling by taxi.   YOU WILL NOT BE PERMITTED TO DRIVE OR TO LEAVE THE HOSPITAL ALONE AFTER SURGERY.   If you are  being discharged the same day, it is strongly recommended that you arrange for someone to remain with you for the first 24 hrs following your surgery.    The Surgeon will speak to your family/visitor after your surgery regarding the outcome of your surgery and post op care.  The Surgeon may speak to you after your surgery, but there is a possibility you may not remember the details.  Please check with your family members regarding the conversation with the Surgeon.    We strongly recommend whoever is bringing you home be present for discharge instructions.  This will ensure a thorough understanding for your post op home care.          Thank you for your cooperation.  The Staff of [facility].            Bathing Instructions with Hibiclens    Shower the evening before and morning of your procedure with Chlorhexidine (Hibiclens)  do not use Chlorhexidine on your face or genitals. Do not get in your eyes.  Wash your face with water and your regular face wash/soap  Use your regular shampoo  Apply Chlorhexidine (Hibiclens) directly on your skin or on a wet washcloth and wash gently. When showering: Move away from the shower stream when applying Chlorhexidine (Hibiclens) to avoid rinsing off too soon.  Rinse thoroughly with warm water  Do not dilute Chlorhexidine (Hibiclens)   Dry off as usual, do not use any deodorant, powder, body lotions, perfume, after shave or cologne.

## 2023-09-06 NOTE — ANESTHESIA PREPROCEDURE EVALUATION
09/06/2023  Chelsi Johnson is a 43 y.o., female.      Pre-op Assessment    I have reviewed the Patient Summary Reports.     I have reviewed the Nursing Notes. I have reviewed the NPO Status.   I have reviewed the Medications.     Review of Systems  Anesthesia Hx:  Denies Family Hx of Anesthesia complications.   Denies Personal Hx of Anesthesia complications.   Social:  Non-Smoker    Hematology/Oncology:     Oncology Normal    -- Anemia:   Cardiovascular:   Hypertension    Pulmonary:   Asthma mild    Renal/:  Renal/ Normal     Hepatic/GI:  Hepatic/GI Normal    Musculoskeletal:  Musculoskeletal Normal    Neurological:  Neurology Normal    Endocrine:   Diabetes  Obesity / BMI > 30      Physical Exam  General: Well nourished, Cooperative, Alert and Oriented    Airway:  Mallampati: II   Mouth Opening: Normal  TM Distance: Normal  Tongue: Normal  Neck ROM: Normal ROM    Dental:  Intact, Braces        Anesthesia Plan  Type of Anesthesia, risks & benefits discussed:    Anesthesia Type: Gen ETT  Intra-op Monitoring Plan: Standard ASA Monitors  Post Op Pain Control Plan: multimodal analgesia  Induction:  IV and rapid sequence  Airway Plan: Video, Post-Induction  Informed Consent: Informed consent signed with the Patient and all parties understand the risks and agree with anesthesia plan.  All questions answered.   ASA Score: 3  Anesthesia Plan Notes: CBC, BMP, T&S  On semaglutide for DM.   RSI    Ready For Surgery From Anesthesia Perspective.     .

## 2023-09-11 ENCOUNTER — TELEPHONE (OUTPATIENT)
Dept: GYNECOLOGIC ONCOLOGY | Facility: CLINIC | Age: 43
End: 2023-09-11
Payer: COMMERCIAL

## 2023-09-11 RX ORDER — LIDOCAINE HYDROCHLORIDE 10 MG/ML
1 INJECTION, SOLUTION EPIDURAL; INFILTRATION; INTRACAUDAL; PERINEURAL ONCE
Status: CANCELLED | OUTPATIENT
Start: 2023-09-11 | End: 2023-09-11

## 2023-09-11 RX ORDER — HEPARIN SODIUM 5000 [USP'U]/ML
5000 INJECTION, SOLUTION INTRAVENOUS; SUBCUTANEOUS ONCE
Status: CANCELLED | OUTPATIENT
Start: 2023-09-11 | End: 2023-09-11

## 2023-09-11 RX ORDER — CEFAZOLIN SODIUM 2 G/50ML
2 SOLUTION INTRAVENOUS
Status: CANCELLED | OUTPATIENT
Start: 2023-09-11

## 2023-09-12 ENCOUNTER — TELEPHONE (OUTPATIENT)
Dept: GYNECOLOGIC ONCOLOGY | Facility: CLINIC | Age: 43
End: 2023-09-12
Payer: COMMERCIAL

## 2023-09-13 ENCOUNTER — HOSPITAL ENCOUNTER (OUTPATIENT)
Facility: OTHER | Age: 43
Discharge: HOME | End: 2023-09-13
Attending: STUDENT IN AN ORGANIZED HEALTH CARE EDUCATION/TRAINING PROGRAM | Admitting: STUDENT IN AN ORGANIZED HEALTH CARE EDUCATION/TRAINING PROGRAM
Payer: COMMERCIAL

## 2023-09-13 ENCOUNTER — ANESTHESIA (OUTPATIENT)
Dept: SURGERY | Facility: OTHER | Age: 43
End: 2023-09-13
Payer: COMMERCIAL

## 2023-09-13 DIAGNOSIS — Z98.890 S/P ROBOT-ASSISTED SURGICAL PROCEDURE: Primary | ICD-10-CM

## 2023-09-13 DIAGNOSIS — D25.2 INTRAMURAL AND SUBSEROUS LEIOMYOMA OF UTERUS: ICD-10-CM

## 2023-09-13 DIAGNOSIS — N93.9 ABNORMAL UTERINE BLEEDING (AUB): ICD-10-CM

## 2023-09-13 DIAGNOSIS — D25.1 INTRAMURAL AND SUBSEROUS LEIOMYOMA OF UTERUS: ICD-10-CM

## 2023-09-13 DIAGNOSIS — E66.9 OBESITY (BMI 35.0-39.9 WITHOUT COMORBIDITY): ICD-10-CM

## 2023-09-13 PROBLEM — D21.9 FIBROIDS: Status: ACTIVE | Noted: 2023-09-13

## 2023-09-13 LAB
B-HCG UR QL: NEGATIVE
CTP QC/QA: YES
POCT GLUCOSE: 254 MG/DL (ref 70–110)
POCT GLUCOSE: 296 MG/DL (ref 70–110)
POCT GLUCOSE: 324 MG/DL (ref 70–110)

## 2023-09-13 PROCEDURE — 81025 URINE PREGNANCY TEST: CPT | Performed by: ANESTHESIOLOGY

## 2023-09-13 PROCEDURE — 88307 PR  SURG PATH,LEVEL V: ICD-10-PCS | Mod: 26,,, | Performed by: STUDENT IN AN ORGANIZED HEALTH CARE EDUCATION/TRAINING PROGRAM

## 2023-09-13 PROCEDURE — 27201423 OPTIME MED/SURG SUP & DEVICES STERILE SUPPLY: Performed by: STUDENT IN AN ORGANIZED HEALTH CARE EDUCATION/TRAINING PROGRAM

## 2023-09-13 PROCEDURE — 71000016 HC POSTOP RECOV ADDL HR: Performed by: STUDENT IN AN ORGANIZED HEALTH CARE EDUCATION/TRAINING PROGRAM

## 2023-09-13 PROCEDURE — 25000003 PHARM REV CODE 250: Performed by: STUDENT IN AN ORGANIZED HEALTH CARE EDUCATION/TRAINING PROGRAM

## 2023-09-13 PROCEDURE — 25000003 PHARM REV CODE 250: Performed by: ANESTHESIOLOGY

## 2023-09-13 PROCEDURE — 71000033 HC RECOVERY, INTIAL HOUR: Performed by: STUDENT IN AN ORGANIZED HEALTH CARE EDUCATION/TRAINING PROGRAM

## 2023-09-13 PROCEDURE — 63600175 PHARM REV CODE 636 W HCPCS: Performed by: ANESTHESIOLOGY

## 2023-09-13 PROCEDURE — 71000015 HC POSTOP RECOV 1ST HR: Performed by: STUDENT IN AN ORGANIZED HEALTH CARE EDUCATION/TRAINING PROGRAM

## 2023-09-13 PROCEDURE — 63600175 PHARM REV CODE 636 W HCPCS: Performed by: NURSE ANESTHETIST, CERTIFIED REGISTERED

## 2023-09-13 PROCEDURE — 37000009 HC ANESTHESIA EA ADD 15 MINS: Performed by: STUDENT IN AN ORGANIZED HEALTH CARE EDUCATION/TRAINING PROGRAM

## 2023-09-13 PROCEDURE — 63600175 PHARM REV CODE 636 W HCPCS: Performed by: STUDENT IN AN ORGANIZED HEALTH CARE EDUCATION/TRAINING PROGRAM

## 2023-09-13 PROCEDURE — 58573 PR LAPAROSCOPY TOT HYSTERECTOMY UTERUS >250 GRAM W TUBE/OVARY: ICD-10-PCS | Mod: 22,,, | Performed by: STUDENT IN AN ORGANIZED HEALTH CARE EDUCATION/TRAINING PROGRAM

## 2023-09-13 PROCEDURE — 82962 GLUCOSE BLOOD TEST: CPT | Performed by: STUDENT IN AN ORGANIZED HEALTH CARE EDUCATION/TRAINING PROGRAM

## 2023-09-13 PROCEDURE — 88307 TISSUE EXAM BY PATHOLOGIST: CPT | Mod: 26,,, | Performed by: STUDENT IN AN ORGANIZED HEALTH CARE EDUCATION/TRAINING PROGRAM

## 2023-09-13 PROCEDURE — 25000003 PHARM REV CODE 250: Performed by: NURSE ANESTHETIST, CERTIFIED REGISTERED

## 2023-09-13 PROCEDURE — 71000039 HC RECOVERY, EACH ADD'L HOUR: Performed by: STUDENT IN AN ORGANIZED HEALTH CARE EDUCATION/TRAINING PROGRAM

## 2023-09-13 PROCEDURE — D9220A PRA ANESTHESIA: Mod: ANES,,, | Performed by: ANESTHESIOLOGY

## 2023-09-13 PROCEDURE — 88307 TISSUE EXAM BY PATHOLOGIST: CPT | Performed by: STUDENT IN AN ORGANIZED HEALTH CARE EDUCATION/TRAINING PROGRAM

## 2023-09-13 PROCEDURE — 37000008 HC ANESTHESIA 1ST 15 MINUTES: Performed by: STUDENT IN AN ORGANIZED HEALTH CARE EDUCATION/TRAINING PROGRAM

## 2023-09-13 PROCEDURE — 58573 TLH W/T/O UTERUS OVER 250 G: CPT | Mod: 22,,, | Performed by: STUDENT IN AN ORGANIZED HEALTH CARE EDUCATION/TRAINING PROGRAM

## 2023-09-13 PROCEDURE — D9220A PRA ANESTHESIA: Mod: CRNA,,, | Performed by: STUDENT IN AN ORGANIZED HEALTH CARE EDUCATION/TRAINING PROGRAM

## 2023-09-13 PROCEDURE — 58573 PR LAPAROSCOPY TOT HYSTERECTOMY UTERUS >250 GRAM W TUBE/OVARY: ICD-10-PCS | Mod: AS,22,, | Performed by: PHYSICIAN ASSISTANT

## 2023-09-13 PROCEDURE — 36000713 HC OR TIME LEV V EA ADD 15 MIN: Performed by: STUDENT IN AN ORGANIZED HEALTH CARE EDUCATION/TRAINING PROGRAM

## 2023-09-13 PROCEDURE — 36000712 HC OR TIME LEV V 1ST 15 MIN: Performed by: STUDENT IN AN ORGANIZED HEALTH CARE EDUCATION/TRAINING PROGRAM

## 2023-09-13 PROCEDURE — 58573 TLH W/T/O UTERUS OVER 250 G: CPT | Mod: AS,22,, | Performed by: PHYSICIAN ASSISTANT

## 2023-09-13 PROCEDURE — D9220A PRA ANESTHESIA: ICD-10-PCS | Mod: CRNA,,, | Performed by: STUDENT IN AN ORGANIZED HEALTH CARE EDUCATION/TRAINING PROGRAM

## 2023-09-13 PROCEDURE — D9220A PRA ANESTHESIA: ICD-10-PCS | Mod: ANES,,, | Performed by: ANESTHESIOLOGY

## 2023-09-13 RX ORDER — DIPHENHYDRAMINE HYDROCHLORIDE 50 MG/ML
25 INJECTION INTRAMUSCULAR; INTRAVENOUS EVERY 6 HOURS PRN
Status: DISCONTINUED | OUTPATIENT
Start: 2023-09-13 | End: 2023-09-13 | Stop reason: HOSPADM

## 2023-09-13 RX ORDER — PROCHLORPERAZINE EDISYLATE 5 MG/ML
5 INJECTION INTRAMUSCULAR; INTRAVENOUS EVERY 30 MIN PRN
Status: DISCONTINUED | OUTPATIENT
Start: 2023-09-13 | End: 2023-09-13 | Stop reason: HOSPADM

## 2023-09-13 RX ORDER — FENTANYL CITRATE 50 UG/ML
INJECTION, SOLUTION INTRAMUSCULAR; INTRAVENOUS
Status: DISCONTINUED | OUTPATIENT
Start: 2023-09-13 | End: 2023-09-13

## 2023-09-13 RX ORDER — CEFAZOLIN SODIUM 2 G/50ML
2 SOLUTION INTRAVENOUS
Status: DISCONTINUED | OUTPATIENT
Start: 2023-09-13 | End: 2023-09-13 | Stop reason: HOSPADM

## 2023-09-13 RX ORDER — HEPARIN SODIUM 5000 [USP'U]/ML
5000 INJECTION, SOLUTION INTRAVENOUS; SUBCUTANEOUS ONCE
Status: COMPLETED | OUTPATIENT
Start: 2023-09-13 | End: 2023-09-13

## 2023-09-13 RX ORDER — SENNOSIDES 8.6 MG/1
TABLET ORAL 2 TIMES DAILY PRN
Qty: 30 TABLET | Refills: 3 | Status: SHIPPED | OUTPATIENT
Start: 2023-09-13 | End: 2024-02-07

## 2023-09-13 RX ORDER — MIDAZOLAM HYDROCHLORIDE 1 MG/ML
INJECTION INTRAMUSCULAR; INTRAVENOUS
Status: DISCONTINUED | OUTPATIENT
Start: 2023-09-13 | End: 2023-09-13

## 2023-09-13 RX ORDER — ROCURONIUM BROMIDE 10 MG/ML
INJECTION, SOLUTION INTRAVENOUS
Status: DISCONTINUED | OUTPATIENT
Start: 2023-09-13 | End: 2023-09-13

## 2023-09-13 RX ORDER — MEPERIDINE HYDROCHLORIDE 25 MG/ML
12.5 INJECTION INTRAMUSCULAR; INTRAVENOUS; SUBCUTANEOUS ONCE AS NEEDED
Status: DISCONTINUED | OUTPATIENT
Start: 2023-09-13 | End: 2023-09-13 | Stop reason: HOSPADM

## 2023-09-13 RX ORDER — KETAMINE HCL IN 0.9 % NACL 50 MG/5 ML
SYRINGE (ML) INTRAVENOUS
Status: DISCONTINUED | OUTPATIENT
Start: 2023-09-13 | End: 2023-09-13

## 2023-09-13 RX ORDER — DEXTROMETHORPHAN HYDROBROMIDE, GUAIFENESIN 5; 100 MG/5ML; MG/5ML
650 LIQUID ORAL EVERY 6 HOURS
Qty: 56 TABLET | Refills: 0 | Status: SHIPPED | OUTPATIENT
Start: 2023-09-13 | End: 2023-09-27

## 2023-09-13 RX ORDER — LIDOCAINE HYDROCHLORIDE 10 MG/ML
0.5 INJECTION, SOLUTION EPIDURAL; INFILTRATION; INTRACAUDAL; PERINEURAL ONCE
Status: DISCONTINUED | OUTPATIENT
Start: 2023-09-13 | End: 2023-09-13 | Stop reason: HOSPADM

## 2023-09-13 RX ORDER — LIDOCAINE HYDROCHLORIDE 20 MG/ML
INJECTION, SOLUTION EPIDURAL; INFILTRATION; INTRACAUDAL; PERINEURAL
Status: DISCONTINUED | OUTPATIENT
Start: 2023-09-13 | End: 2023-09-13

## 2023-09-13 RX ORDER — DEXMEDETOMIDINE HYDROCHLORIDE 100 UG/ML
INJECTION, SOLUTION INTRAVENOUS
Status: DISCONTINUED | OUTPATIENT
Start: 2023-09-13 | End: 2023-09-13

## 2023-09-13 RX ORDER — SUCCINYLCHOLINE CHLORIDE 20 MG/ML
INJECTION INTRAMUSCULAR; INTRAVENOUS
Status: DISCONTINUED | OUTPATIENT
Start: 2023-09-13 | End: 2023-09-13

## 2023-09-13 RX ORDER — HYDROMORPHONE HYDROCHLORIDE 2 MG/ML
0.4 INJECTION, SOLUTION INTRAMUSCULAR; INTRAVENOUS; SUBCUTANEOUS EVERY 5 MIN PRN
Status: DISCONTINUED | OUTPATIENT
Start: 2023-09-13 | End: 2023-09-13 | Stop reason: HOSPADM

## 2023-09-13 RX ORDER — SODIUM CHLORIDE, SODIUM LACTATE, POTASSIUM CHLORIDE, CALCIUM CHLORIDE 600; 310; 30; 20 MG/100ML; MG/100ML; MG/100ML; MG/100ML
INJECTION, SOLUTION INTRAVENOUS CONTINUOUS
Status: DISCONTINUED | OUTPATIENT
Start: 2023-09-13 | End: 2023-09-13 | Stop reason: HOSPADM

## 2023-09-13 RX ORDER — ONDANSETRON 2 MG/ML
INJECTION INTRAMUSCULAR; INTRAVENOUS
Status: DISCONTINUED | OUTPATIENT
Start: 2023-09-13 | End: 2023-09-13

## 2023-09-13 RX ORDER — KETOROLAC TROMETHAMINE 30 MG/ML
INJECTION, SOLUTION INTRAMUSCULAR; INTRAVENOUS
Status: DISCONTINUED | OUTPATIENT
Start: 2023-09-13 | End: 2023-09-13

## 2023-09-13 RX ORDER — OXYCODONE HYDROCHLORIDE 5 MG/1
5 TABLET ORAL EVERY 4 HOURS PRN
Qty: 10 TABLET | Refills: 0 | Status: SHIPPED | OUTPATIENT
Start: 2023-09-13 | End: 2023-10-06

## 2023-09-13 RX ORDER — CEFAZOLIN SODIUM 1 G/3ML
INJECTION, POWDER, FOR SOLUTION INTRAMUSCULAR; INTRAVENOUS
Status: DISCONTINUED | OUTPATIENT
Start: 2023-09-13 | End: 2023-09-13

## 2023-09-13 RX ORDER — LIDOCAINE HYDROCHLORIDE 10 MG/ML
1 INJECTION, SOLUTION EPIDURAL; INFILTRATION; INTRACAUDAL; PERINEURAL ONCE
Status: DISCONTINUED | OUTPATIENT
Start: 2023-09-13 | End: 2023-09-13 | Stop reason: HOSPADM

## 2023-09-13 RX ORDER — SODIUM CHLORIDE 0.9 % (FLUSH) 0.9 %
3 SYRINGE (ML) INJECTION
Status: DISCONTINUED | OUTPATIENT
Start: 2023-09-13 | End: 2023-09-13 | Stop reason: HOSPADM

## 2023-09-13 RX ORDER — DEXAMETHASONE SODIUM PHOSPHATE 4 MG/ML
INJECTION, SOLUTION INTRA-ARTICULAR; INTRALESIONAL; INTRAMUSCULAR; INTRAVENOUS; SOFT TISSUE
Status: DISCONTINUED | OUTPATIENT
Start: 2023-09-13 | End: 2023-09-13

## 2023-09-13 RX ORDER — IBUPROFEN 600 MG/1
600 TABLET ORAL EVERY 6 HOURS
Qty: 56 TABLET | Refills: 0 | Status: SHIPPED | OUTPATIENT
Start: 2023-09-13 | End: 2023-09-27

## 2023-09-13 RX ORDER — PROPOFOL 10 MG/ML
VIAL (ML) INTRAVENOUS
Status: DISCONTINUED | OUTPATIENT
Start: 2023-09-13 | End: 2023-09-13

## 2023-09-13 RX ORDER — ACETAMINOPHEN 500 MG
1000 TABLET ORAL
Status: COMPLETED | OUTPATIENT
Start: 2023-09-13 | End: 2023-09-13

## 2023-09-13 RX ORDER — OXYCODONE HYDROCHLORIDE 5 MG/1
5 TABLET ORAL
Status: DISCONTINUED | OUTPATIENT
Start: 2023-09-13 | End: 2023-09-13 | Stop reason: HOSPADM

## 2023-09-13 RX ADMIN — ROCURONIUM BROMIDE 20 MG: 10 INJECTION INTRAVENOUS at 11:09

## 2023-09-13 RX ADMIN — Medication 10 MG: at 12:09

## 2023-09-13 RX ADMIN — DEXMEDETOMIDINE HYDROCHLORIDE 12 MCG: 100 INJECTION, SOLUTION, CONCENTRATE INTRAVENOUS at 11:09

## 2023-09-13 RX ADMIN — GLYCOPYRROLATE 0.2 MG: 0.2 INJECTION, SOLUTION INTRAMUSCULAR; INTRAVITREAL at 11:09

## 2023-09-13 RX ADMIN — INSULIN HUMAN 10 UNITS: 100 INJECTION, SOLUTION PARENTERAL at 02:09

## 2023-09-13 RX ADMIN — FENTANYL CITRATE 50 MCG: 0.05 INJECTION, SOLUTION INTRAMUSCULAR; INTRAVENOUS at 11:09

## 2023-09-13 RX ADMIN — ONDANSETRON 4 MG: 2 INJECTION INTRAMUSCULAR; INTRAVENOUS at 01:09

## 2023-09-13 RX ADMIN — PROCHLORPERAZINE EDISYLATE 5 MG: 5 INJECTION, SOLUTION INTRAMUSCULAR; INTRAVENOUS at 03:09

## 2023-09-13 RX ADMIN — KETOROLAC TROMETHAMINE 30 MG: 30 INJECTION, SOLUTION INTRAMUSCULAR; INTRAVENOUS at 01:09

## 2023-09-13 RX ADMIN — PROPOFOL 200 MG: 10 INJECTION, EMULSION INTRAVENOUS at 11:09

## 2023-09-13 RX ADMIN — ACETAMINOPHEN 1000 MG: 500 TABLET ORAL at 10:09

## 2023-09-13 RX ADMIN — PROPOFOL 20 MG: 10 INJECTION, EMULSION INTRAVENOUS at 01:09

## 2023-09-13 RX ADMIN — SUGAMMADEX 200 MG: 100 INJECTION, SOLUTION INTRAVENOUS at 01:09

## 2023-09-13 RX ADMIN — SUCCINYLCHOLINE CHLORIDE 160 MG: 20 INJECTION, SOLUTION INTRAMUSCULAR; INTRAVENOUS at 11:09

## 2023-09-13 RX ADMIN — SODIUM CHLORIDE, SODIUM LACTATE, POTASSIUM CHLORIDE, AND CALCIUM CHLORIDE: 600; 310; 30; 20 INJECTION, SOLUTION INTRAVENOUS at 10:09

## 2023-09-13 RX ADMIN — DEXAMETHASONE SODIUM PHOSPHATE 4 MG: 4 INJECTION, SOLUTION INTRAMUSCULAR; INTRAVENOUS at 11:09

## 2023-09-13 RX ADMIN — ROCURONIUM BROMIDE 15 MG: 10 INJECTION INTRAVENOUS at 12:09

## 2023-09-13 RX ADMIN — OXYCODONE HYDROCHLORIDE 5 MG: 5 TABLET ORAL at 02:09

## 2023-09-13 RX ADMIN — FENTANYL CITRATE 50 MCG: 0.05 INJECTION, SOLUTION INTRAMUSCULAR; INTRAVENOUS at 01:09

## 2023-09-13 RX ADMIN — CEFAZOLIN 2 G: 330 INJECTION, POWDER, FOR SOLUTION INTRAMUSCULAR; INTRAVENOUS at 11:09

## 2023-09-13 RX ADMIN — ROCURONIUM BROMIDE 40 MG: 10 INJECTION INTRAVENOUS at 11:09

## 2023-09-13 RX ADMIN — LIDOCAINE HYDROCHLORIDE 60 MG: 20 INJECTION, SOLUTION EPIDURAL; INFILTRATION; INTRACAUDAL; PERINEURAL at 11:09

## 2023-09-13 RX ADMIN — Medication 40 MG: at 11:09

## 2023-09-13 RX ADMIN — FENTANYL CITRATE 100 MCG: 0.05 INJECTION, SOLUTION INTRAMUSCULAR; INTRAVENOUS at 11:09

## 2023-09-13 RX ADMIN — CARBOXYMETHYLCELLULOSE SODIUM 2 DROP: 2.5 SOLUTION/ DROPS OPHTHALMIC at 11:09

## 2023-09-13 RX ADMIN — SODIUM CHLORIDE, SODIUM LACTATE, POTASSIUM CHLORIDE, AND CALCIUM CHLORIDE: 600; 310; 30; 20 INJECTION, SOLUTION INTRAVENOUS at 12:09

## 2023-09-13 RX ADMIN — HEPARIN SODIUM 5000 UNITS: 5000 INJECTION INTRAVENOUS; SUBCUTANEOUS at 10:09

## 2023-09-13 RX ADMIN — MIDAZOLAM HYDROCHLORIDE 2 MG: 1 INJECTION, SOLUTION INTRAMUSCULAR; INTRAVENOUS at 10:09

## 2023-09-13 RX ADMIN — ROCURONIUM BROMIDE 10 MG: 10 INJECTION INTRAVENOUS at 11:09

## 2023-09-13 RX ADMIN — DEXMEDETOMIDINE HYDROCHLORIDE 8 MCG: 100 INJECTION, SOLUTION, CONCENTRATE INTRAVENOUS at 12:09

## 2023-09-13 NOTE — TRANSFER OF CARE
"Anesthesia Transfer of Care Note    Patient: Chelsi Johnson    Procedure(s) Performed: Procedure(s) (LRB):  XI ROBOTIC HYSTERECTOMY (N/A)  XI ROBOTIC SALPINGECTOMY (Bilateral)    Patient location: PACU    Anesthesia Type: general    Transport from OR: Transported from OR on 6-10 L/min O2 by face mask with adequate spontaneous ventilation    Post pain: adequate analgesia    Post assessment: no apparent anesthetic complications and tolerated procedure well    Post vital signs: stable    Level of consciousness: awake and alert    Nausea/Vomiting: no nausea/vomiting    Complications: none    Transfer of care protocol was followed      Last vitals:   Visit Vitals  /85   Pulse 77   Temp 36.8 °C (98.3 °F)   Resp 16   Ht 5' 4" (1.626 m)   Wt 99.6 kg (219 lb 9.3 oz)   LMP 04/23/2013 (Approximate)   SpO2 99%   Breastfeeding No   BMI 37.69 kg/m²     "

## 2023-09-13 NOTE — PLAN OF CARE
Chelsi Johnson has met all discharge criteria from Phase II. Vital Signs are stable, ambulating  without difficulty. Discharge instructions given, patient verbalized understanding. Discharged from facility via wheelchair in stable condition.

## 2023-09-13 NOTE — ANESTHESIA PROCEDURE NOTES
Intubation    Date/Time: 9/13/2023 11:07 AM    Performed by: Krupa Arcos CRNA  Authorized by: El Langford MD    Intubation:     Induction:  Rapid sequence induction    Intubated:  Postinduction    Mask Ventilation:  Not attempted    Attempts:  1    Attempted By:  CRNA    Method of Intubation:  Video laryngoscopy    Blade:  Hull 3    Laryngeal View Grade: Grade I - full view of cords      Difficult Airway Encountered?: No      Complications:  None    Airway Device:  Oral endotracheal tube    Airway Device Size:  7.5    Style/Cuff Inflation:  Cuffed (inflated to minimal occlusive pressure)    Tube secured:  23    Secured at:  The lips    Placement Verified By:  Capnometry    Complicating Factors:  None    Findings Post-Intubation:  BS equal bilateral and atraumatic/condition of teeth unchanged

## 2023-09-13 NOTE — INTERVAL H&P NOTE
The patient has been examined and the H&P has been reviewed:    I concur with the findings and no changes have occurred since H&P was written.    Surgery risks, benefits and alternative options discussed and understood by patient/family. To OR for RA TLH BS with possible USO vs BSO pending intra op findings. Consents in chart. Pre op heparin @ 1032.       Active Hospital Problems    Diagnosis  POA    Fibroids [D21.9]  Unknown      Resolved Hospital Problems   No resolved problems to display.     Holli Ambrocio MD  PGY 3  Obstetrics and Gynecology

## 2023-09-13 NOTE — OP NOTE
DATE OF PROCEDURE:  9/13/23     SURGEON: Austin Ku MD        ASSISTANTS: Chen Freedman who served as first assist  MD Mahogany Acevedo MD       PREOPERATIVE DIAGNOSES: Uterine leiomyoma  Morbid obesity BMI 37     POSTOPERATIVE DIAGNOSES: Uterine leiomyoma  Morbid obesity BMI 37     PROCEDURES:  Robotic-assisted laparoscopic hysterectomy and bilateral salpingectomy  Laparoscopic lysis of adhesions     COMPLICATIONS: None     ESTIMATED BLOOD LOSS: 25 cc     ANESTHESIA: GETA     INTRAOPERATIVE FINDINGS:  Omental adhesions to anterior abdominal wall. Enlarged fibroid uterus. Normal appearing bilateral ovaries. Left ovary with small 1 cm cyst.        PROCEDURE IN DETAIL: Informed consent was obtained and the patient was taken to   the Operating Suite.  General anesthesia was administered.  Once felt to be   adequate, she was placed in dorsal lithotomy position with her arms tucked.  The   abdomen and pelvis were prepped and draped in the usual fashion.  A Farr catheter was placed to gravity drainage and a speculum was placed in the vagina.  The cervix was visualized, grasped with a single-tooth tenaculum and the uterus sounded to approximately 10 cm.  Serial dilation of cervix was performed and a Large VCare manipulator was placed without difficulty.   Attention was turned to the abdominal portion of the procedure. An 8 mm supra umbilical incision was made and a Veress needle was placed in the peritoneal cavity, confirmed by low opening pressure.  Pneumoperitoneum was obtained with carbon dioxide up to 15 mmHg and a robotic trocar was placed through through the incision.  Intraperitoneal placement was confirmed with the camera.    Three additional robotic trocars were placed in the right and left mid clavicular lines and the left mid axillary line. A 5mm AirSeal port was placed in the right mid axillary line. All ports were placed under direct visualization. Abdominal survey revealed findings as above.        A laparoscopic ligasure was used to take down omental adhesions to the anterior abdominal wall taking care to avoid injury to the small bowel and transverse colon. The patient was placed in steep Trendelenburg and the bowel was retracted out of the pelvis.     The robot was docked and instruments were passed in the operative field.  Attention was turned to the right side. The retroperitoneum was opened parallel to the infundibulopelvic ligament. The ureter was identified. The fallopian tubes were elevated and the mesosalpinx was cauterized and transected, taking care to avoid injury to the infundibulopelvic ligaments. The tubes were mobilized back to the cornua. Windows were created inferior to the uteroovarian vessels, which were cauterized and transected.      The posterior leaves of the broad ligament were dissected down to the level of the cup. The bilateral round ligaments and anterior leaves of the broad ligament were transected and the vesicouterine peritoneum was incised. The bladder was reflected down below the level of the cup and an anterior colopotomy was created. The uterine vessels were further skeletonized, cauterized, and transected, followed by cauterization and transection of the remaining cardinal ligaments. The colpotomy was extended circumferentially and the specimen was placed in an Abena bag.    The uterus was morcellated inside the abena bag, until the specimen was able to be delivered.    The cuff was then closed with a 2-0 V Lock suture.  The pelvis was irrigated and noted to be hemostatic.  Once hemostasis was confirmed, the instruments were removed, the robot was undocked and the pneumoperitoneum was evacuated.  The patient was flattened.  All ports were removed and port sites were inspected and made hemostatic with electrocautery and closed with subcuticular 4-0 Monocryl suture and dermabond. The patient was awoken and taken to Recovery Room in stable condition.  I was present  for and performed all key aspects of procedure.      Due to persistent difficulty maintaining safe visibility and significant effort to revisualize key structures often throughout the surgery, this procedure took approximately twice as long as normal.      Chen Freedman's expertise was needed as there was no qualified   resident available.    Austin Ku MD

## 2023-09-13 NOTE — DISCHARGE SUMMARY
[facility] Surgery ([place])  Brief Operative Note     SUMMARY     Surgery Date: 9/13/2023     Surgeon(s) and Role:     * Austin Henson MD - Primary     * Oneida Ambrocio MD - Resident - Assisting     * Mahogany Jones MD - Resident - Assisting        Pre-op Diagnosis:  Abnormal uterine bleeding (AUB) [N93.9]  Pelvic pain [R10.2]  Fibroids [D21.9]    Post-op Diagnosis:  Post-Op Diagnosis Codes:     * Abnormal uterine bleeding (AUB) [N93.9]     * Pelvic pain [R10.2]     * Fibroids [D21.9]    Procedure(s) (LRB):  XI ROBOTIC HYSTERECTOMY (N/A)  XI ROBOTIC SALPINGECTOMY (Bilateral)    Anesthesia: General    Findings/Key Components:   1. Omental adhesions to anterior abdominal wall. Enlarged fibroid uterus. Normal appearing bilateral ovaries. Left ovary with small 1 cm cyst. See OP note for further details.    Estimated Blood Loss: 50 mL    Complications: none         Specimens:   Specimen (24h ago, onward)       Start     Ordered    09/13/23 1337  Specimen to Pathology, Surgery Gynecology and Obstetrics  Once        Comments: Pre-op Diagnosis: Abnormal uterine bleeding (AUB) [N93.9]Pelvic pain [R10.2]Fibroids [D21.9]Procedure(s):XI ROBOTIC HYSTERECTOMYXI ROBOTIC SALPINGECTOMYNumber of specimens: 1Name of specimens: 1) Uterus, Cervix, and Bilateral Fallopian Tubes     References:    Click here for ordering Quick Tip   Question Answer Comment   Procedure Type: Gynecology and Obstetrics    Specimen Class: Known or suspected malignancy    Which provider would you like to cc? AUSTIN HENSON    Release to patient Immediate        09/13/23 1339                    Discharge Note    SUMMARY     Admit Date: 9/13/2023    Discharge Date:  09/13/2023 3:34 PM    Hospital Course: Patient was admitted for the above mentioned procedure.  Procedure was performed without difficulty.  Please see operative report for further details.  She was transferred to recovery in stable condition and discharged home the same day.       Final Diagnosis: Post-Op Diagnosis Codes:     * Abnormal uterine bleeding (AUB) [N93.9]     * Pelvic pain [R10.2]     * Fibroids [D21.9]    Disposition: Home or Self Care    Follow Up/Patient Instructions: see below    Medications:  Reconciled Home Medications:      Medication List        START taking these medications      acetaminophen 650 MG Tbsr  Commonly known as: TYLENOL  Take 1 tablet (650 mg total) by mouth every 6 (six) hours. Alternate between ibuprofen and tylenol every 3 hours. For example: @0800: ibuprofen 600mg @1100: tylenol 650mg @1400: ibuprofen 600mg @1700: tylenol 650 mg @2000: ibuprofen 600mg for 14 days     oxyCODONE 5 MG immediate release tablet  Commonly known as: ROXICODONE  Take 1 tablet (5 mg total) by mouth every 4 (four) hours as needed for Pain.     SENNA 8.6 mg tablet  Generic drug: senna  Take 1 tablet by mouth 2 (two) times daily as needed.            CHANGE how you take these medications      ibuprofen 600 MG tablet  Commonly known as: ADVIL,MOTRIN  Take 1 tablet (600 mg total) by mouth every 6 (six) hours. Alternate between ibuprofen and tylenol every 3 hours. For example: @0800: ibuprofen 600mg @1100: tylenol 650mg @1400: ibuprofen 600mg @1700: tylenol 650 mg @2000: ibuprofen 600mg for 14 days  What changed:   when to take this  reasons to take this  additional instructions            CONTINUE taking these medications      ALBUTEROL INHL  Inhale 2 puffs into the lungs as needed.     amLODIPine 5 MG tablet  Commonly known as: NORVASC  Take 1 tablet (5 mg total) by mouth once daily.     doxycycline 100 MG Cap  Commonly known as: VIBRAMYCIN  Take 1 capsule (100 mg total) by mouth 2 (two) times daily.     JARDIANCE 10 mg tablet  Generic drug: empagliflozin  Take 1 tablet (10 mg total) by mouth once daily.     losartan 25 MG tablet  Commonly known as: COZAAR  Take 2 tablets (50 mg total) by mouth once daily.     metFORMIN 1000 MG tablet  Commonly known as: GLUCOPHAGE  Take 1,000 mg  by mouth 2 (two) times daily with meals.     metroNIDAZOLE 500 MG tablet  Commonly known as: FLAGYL  Take 1 tablet (500 mg total) by mouth every 12 (twelve) hours.     ondansetron 4 MG tablet  Commonly known as: ZOFRAN  Take 1 tablet (4 mg total) by mouth daily as needed for Nausea.     ORIAHNN 300-1-0.5mg(AM) /300 mg(PM) Cpsq  Generic drug: elagolix-estradiol-norethindrn  Take 1 tablet by mouth 2 (two) times a day.     OZEMPIC 1 mg/dose (4 mg/3 mL)  Generic drug: semaglutide  Inject 1 mg into the skin every 7 days.            STOP taking these medications      oxyCODONE-acetaminophen 5-325 mg per tablet  Commonly known as: PERCOCET            Discharge Procedure Orders   Diet general     Lifting restrictions   Order Comments: No lifting greater than 15 pounds for six weeks.     Other restrictions (specify):   Order Comments: PELVIC REST:  No douching, tampons, or intercourse for 6 weeks.    If prescribed, vaginal estrogen cream may be used during the postoperative period.     DRIVING:  No driving while on narcotics. Driving may be resumed initially with a competent passenger one to two weeks after surgery if no longer taking narcotics.     EXERCISE:  For six weeks your exercise should be limited to walking. You may walk as far as you wish, as long as you increase your level of exertion gradually and avoid slippery surfaces. You may climb stairs as needed to get around, but should not use stair climbing for exercise.     Remove dressing in 24 hours   Order Comments: If you have a bandage on wound, you may remove it the day after dismissal.  If you had steri-strips remove them once they begin to peel off (usually 2 weeks). Keep incision clean and dry.  Inspect the incision daily for signs and symptoms of infection.     Wound care routine (specify)   Order Comments: WOUND CARE:  If you have a band-aid or bandage on your wound, you may remove it the day after dismissal.  If you had steri-strips remove them once they  begin to peel off (usually 2 weeks).  If your steri-strips still haven't come off in 2 weeks, please remove them. You may wash the wound with mild soap and water.   You may shower at any time but should avoid immersing any abdominal incisions in water for at least two weeks after surgery or until the wound is completely healed. If given, please shower with Hibiclens soap until bottle is completely finished. Keep your wound clean and dry.  You should observe your incision for signs of infection which include redness, warmth, drainage or fever.     Call MD for:  temperature >100.4     Call MD for:  persistent nausea and vomiting     Call MD for:  severe uncontrolled pain     Call MD for:  difficulty breathing, headache or visual disturbances     Call MD for:  redness, tenderness, or signs of infection (pain, swelling, redness, odor or green/yellow discharge around incision site)     Call MD for:  hives     Call MD for:   Order Comments: inability to void,urine is ketchup colored or you have large clots, vaginal bleeding is heavier than a period.    VAGINAL DISCHARGE: You may develop a vaginal discharge and intermittent vaginal spotting after surgery and up to 6 weeks postoperatively.  The discharge may have an odor and may change in color but it is normal.  This is due to dissolving stiches.  Contact your surgical team if you develop vaginal or vulvar irritation along with a discharge.  Also contact your surgical team if you have vaginal discharge that smells like urine or stool.    CONSTIPATION REMEDIES: Patients are often constipated after surgery or with use of oral narcotic medicine. You should continue to take the stool softener, Senokot-S during the next six weeks, and consume adequate amounts of water.  If you have not had a bowel movement for 3 days after dismissal, or are uncomfortable and unable to pass stool, please try one or all of the following measures:  1.  Milk of Magnesia - 30 cc by mouth every 12  hours   2.  Dulcolax suppository - One suppository per rectum every 4-6 hours   3.  Metamucil, Fibercon or other bulk former - use as directed  4.  Fleets Enema        PAIN MEDICATIONS:     Take your pain medications as instructed. It is best to take pain medications before your pain becomes severe. This will allow you to take less medication yet have better pain relief. For the first 2 or 3 days it may be helpful to take your pain medications on a regular schedule (e.g. every 4 to 6 hours). This will help you to keep your pain under better control. You should then begin to take fewer medications each day until you no longer need them. Do not take pain medication on an empty stomach. This may lead to nausea and vomiting.     Activity as tolerated   Order Comments: Return to normal activity slowly as you feel able.  For 6 weeks your exercise should be limited to walking.  You may walk as far as you wish, as long as you increase your level of exertion gradually and avoid slippery surfaces.    If you had a hysterectomy at the surgery do not insert anything in your vagina for 9 weeks.     Shower on day dressing removed (No bath)   Order Comments: You may shower at any time but should avoid immersing any abdominal incisions in water for at least 2 weeks after surgery or until the wound is completely healed.  If given, please shower with Hibaclens soap until bottle is completely finish      Follow-up Information       Austin Ku MD Follow up on 9/28/2023.    Specialty: Obstetrics and Gynecology  Why: Post op visit  Contact information:  [address]  [address] [id number] [phone]                           Kimberley Manley MD  Obstetrics and Gynecology, PGY-1

## 2023-09-13 NOTE — OR NURSING
Dr. Nath notified via phone call of [id number]. Insulin ordered, see MAR. To recheck CBG before discharge.

## 2023-09-13 NOTE — ANESTHESIA POSTPROCEDURE EVALUATION
Anesthesia Post Evaluation    Patient: Chelsi Johnson    Procedure(s) Performed: Procedure(s) (LRB):  XI ROBOTIC HYSTERECTOMY (N/A)  XI ROBOTIC SALPINGECTOMY (Bilateral)    Final Anesthesia Type: general      Patient location during evaluation: PACU  Patient participation: Yes- Able to Participate  Level of consciousness: awake and alert  Post-procedure vital signs: reviewed and stable  Pain management: adequate  Airway patency: patent    PONV status at discharge: No PONV  Anesthetic complications: no      Cardiovascular status: blood pressure returned to baseline  Respiratory status: spontaneous ventilation  Hydration status: euvolemic  Follow-up not needed.          Vitals Value Taken Time   /91 09/13/23 1447   Temp 36.6 °C (97.9 °F) 09/13/23 1402   Pulse 90 09/13/23 1458   Resp 16 09/13/23 1430   SpO2 98 % 09/13/23 1458   Vitals shown include unvalidated device data.      Event Time   Out of Recovery 15:00:00         Pain/Billy Score: Pain Rating Prior to Med Admin: 4 (9/13/2023  2:29 PM)  Pain Rating Post Med Admin: 0 (9/13/2023  2:47 PM)  Billy Score: 9 (9/13/2023  2:47 PM)

## 2023-09-13 NOTE — OR NURSING
Pt reported she is on vibramycin and flagyl for a vaginal bacterial infection that had started 1 week ago. Pt stated Dr. Ku is aware. Charge nurse updated.

## 2023-09-14 VITALS
OXYGEN SATURATION: 95 % | RESPIRATION RATE: 17 BRPM | BODY MASS INDEX: 37.48 KG/M2 | TEMPERATURE: 98 F | HEIGHT: 64 IN | WEIGHT: 219.56 LBS | HEART RATE: 94 BPM | SYSTOLIC BLOOD PRESSURE: 160 MMHG | DIASTOLIC BLOOD PRESSURE: 75 MMHG

## 2023-09-15 NOTE — OPERATIVE NOTE ADDENDUM
Certification of Assistant at Surgery       Surgery Date: 9/13/2023     Participating Surgeons:  Surgeon(s) and Role:     * Austin Ku MD - Primary     * Oneida Ambrocio MD - Resident - Assisting     * Mahogany Jones MD - Resident - Assisting    Procedures:  Procedure(s) (LRB):  XI ROBOTIC HYSTERECTOMY (N/A)  XI ROBOTIC SALPINGECTOMY (Bilateral)    Assistant Surgeon's Certification of Necessity:  I understand that section 1842 (b) (6) (d) of the Social Security Act generally prohibits Medicare Part B reasonable charge payment for the services of assistants at surgery in teaching hospitals when qualified residents are available to furnish such services. I certify that the services for which payment is claimed were medically necessary, and that no qualified resident was available to perform the services. I further understand that these services are subject to post-payment review by the Medicare carrier.    Chen Freedman PA-C    09/15/2023  11:42 AM

## 2023-09-20 ENCOUNTER — PATIENT MESSAGE (OUTPATIENT)
Dept: GYNECOLOGIC ONCOLOGY | Facility: CLINIC | Age: 43
End: 2023-09-20
Payer: COMMERCIAL

## 2023-09-21 ENCOUNTER — LAB VISIT (OUTPATIENT)
Dept: LAB | Facility: HOSPITAL | Age: 43
End: 2023-09-21
Attending: STUDENT IN AN ORGANIZED HEALTH CARE EDUCATION/TRAINING PROGRAM
Payer: COMMERCIAL

## 2023-09-21 DIAGNOSIS — R39.9 UTI SYMPTOMS: Primary | ICD-10-CM

## 2023-09-21 DIAGNOSIS — R30.0 DYSURIA: Primary | ICD-10-CM

## 2023-09-21 DIAGNOSIS — R39.9 UTI SYMPTOMS: ICD-10-CM

## 2023-09-21 LAB
BACTERIA #/AREA URNS AUTO: NORMAL /HPF
BILIRUB UR QL STRIP: NEGATIVE
CLARITY UR REFRACT.AUTO: CLEAR
COLOR UR AUTO: YELLOW
FINAL PATHOLOGIC DIAGNOSIS: NORMAL
GLUCOSE UR QL STRIP: ABNORMAL
GROSS: NORMAL
HGB UR QL STRIP: ABNORMAL
KETONES UR QL STRIP: ABNORMAL
LEUKOCYTE ESTERASE UR QL STRIP: NEGATIVE
Lab: NORMAL
MICROSCOPIC COMMENT: NORMAL
NITRITE UR QL STRIP: NEGATIVE
PH UR STRIP: 7 [PH] (ref 5–8)
PROT UR QL STRIP: NEGATIVE
RBC #/AREA URNS AUTO: 4 /HPF (ref 0–4)
SP GR UR STRIP: <=1.005 (ref 1–1.03)
URN SPEC COLLECT METH UR: ABNORMAL
UROBILINOGEN UR STRIP-ACNC: NEGATIVE EU/DL
WBC #/AREA URNS AUTO: 0 /HPF (ref 0–5)
YEAST UR QL AUTO: NORMAL

## 2023-09-21 PROCEDURE — 87086 URINE CULTURE/COLONY COUNT: CPT | Mod: PO | Performed by: STUDENT IN AN ORGANIZED HEALTH CARE EDUCATION/TRAINING PROGRAM

## 2023-09-21 PROCEDURE — 81000 URINALYSIS NONAUTO W/SCOPE: CPT | Mod: PO | Performed by: STUDENT IN AN ORGANIZED HEALTH CARE EDUCATION/TRAINING PROGRAM

## 2023-09-21 RX ORDER — NITROFURANTOIN 25; 75 MG/1; MG/1
100 CAPSULE ORAL 2 TIMES DAILY
Qty: 14 CAPSULE | Refills: 0 | Status: SHIPPED | OUTPATIENT
Start: 2023-09-21 | End: 2023-09-28

## 2023-09-22 LAB
BACTERIA UR CULT: NORMAL
BACTERIA UR CULT: NORMAL

## 2023-09-28 ENCOUNTER — OFFICE VISIT (OUTPATIENT)
Dept: GYNECOLOGIC ONCOLOGY | Facility: CLINIC | Age: 43
End: 2023-09-28
Payer: COMMERCIAL

## 2023-09-28 VITALS
BODY MASS INDEX: 35.67 KG/M2 | DIASTOLIC BLOOD PRESSURE: 77 MMHG | WEIGHT: 214.06 LBS | HEIGHT: 65 IN | HEART RATE: 76 BPM | SYSTOLIC BLOOD PRESSURE: 131 MMHG

## 2023-09-28 DIAGNOSIS — E11.9 TYPE 2 DIABETES MELLITUS WITHOUT COMPLICATION, WITHOUT LONG-TERM CURRENT USE OF INSULIN: ICD-10-CM

## 2023-09-28 DIAGNOSIS — Z98.890 S/P ROBOT-ASSISTED SURGICAL PROCEDURE: ICD-10-CM

## 2023-09-28 DIAGNOSIS — D25.1 INTRAMURAL AND SUBSEROUS LEIOMYOMA OF UTERUS: ICD-10-CM

## 2023-09-28 DIAGNOSIS — E66.9 OBESITY (BMI 35.0-39.9 WITHOUT COMORBIDITY): Primary | ICD-10-CM

## 2023-09-28 DIAGNOSIS — D25.2 INTRAMURAL AND SUBSEROUS LEIOMYOMA OF UTERUS: ICD-10-CM

## 2023-09-28 PROCEDURE — 3078F DIAST BP <80 MM HG: CPT | Mod: CPTII,S$GLB,, | Performed by: STUDENT IN AN ORGANIZED HEALTH CARE EDUCATION/TRAINING PROGRAM

## 2023-09-28 PROCEDURE — 99213 OFFICE O/P EST LOW 20 MIN: CPT | Mod: 24,S$GLB,, | Performed by: STUDENT IN AN ORGANIZED HEALTH CARE EDUCATION/TRAINING PROGRAM

## 2023-09-28 PROCEDURE — 3046F HEMOGLOBIN A1C LEVEL >9.0%: CPT | Mod: CPTII,S$GLB,, | Performed by: STUDENT IN AN ORGANIZED HEALTH CARE EDUCATION/TRAINING PROGRAM

## 2023-09-28 PROCEDURE — 1159F PR MEDICATION LIST DOCUMENTED IN MEDICAL RECORD: ICD-10-PCS | Mod: CPTII,S$GLB,, | Performed by: STUDENT IN AN ORGANIZED HEALTH CARE EDUCATION/TRAINING PROGRAM

## 2023-09-28 PROCEDURE — 3061F NEG MICROALBUMINURIA REV: CPT | Mod: CPTII,S$GLB,, | Performed by: STUDENT IN AN ORGANIZED HEALTH CARE EDUCATION/TRAINING PROGRAM

## 2023-09-28 PROCEDURE — 3075F PR MOST RECENT SYSTOLIC BLOOD PRESS GE 130-139MM HG: ICD-10-PCS | Mod: CPTII,S$GLB,, | Performed by: STUDENT IN AN ORGANIZED HEALTH CARE EDUCATION/TRAINING PROGRAM

## 2023-09-28 PROCEDURE — 3046F PR MOST RECENT HEMOGLOBIN A1C LEVEL > 9.0%: ICD-10-PCS | Mod: CPTII,S$GLB,, | Performed by: STUDENT IN AN ORGANIZED HEALTH CARE EDUCATION/TRAINING PROGRAM

## 2023-09-28 PROCEDURE — 3075F SYST BP GE 130 - 139MM HG: CPT | Mod: CPTII,S$GLB,, | Performed by: STUDENT IN AN ORGANIZED HEALTH CARE EDUCATION/TRAINING PROGRAM

## 2023-09-28 PROCEDURE — 3061F PR NEG MICROALBUMINURIA RESULT DOCUMENTED/REVIEW: ICD-10-PCS | Mod: CPTII,S$GLB,, | Performed by: STUDENT IN AN ORGANIZED HEALTH CARE EDUCATION/TRAINING PROGRAM

## 2023-09-28 PROCEDURE — 4010F PR ACE/ARB THEARPY RXD/TAKEN: ICD-10-PCS | Mod: CPTII,S$GLB,, | Performed by: STUDENT IN AN ORGANIZED HEALTH CARE EDUCATION/TRAINING PROGRAM

## 2023-09-28 PROCEDURE — 99999 PR PBB SHADOW E&M-EST. PATIENT-LVL III: CPT | Mod: PBBFAC,,, | Performed by: STUDENT IN AN ORGANIZED HEALTH CARE EDUCATION/TRAINING PROGRAM

## 2023-09-28 PROCEDURE — 99999 PR PBB SHADOW E&M-EST. PATIENT-LVL III: ICD-10-PCS | Mod: PBBFAC,,, | Performed by: STUDENT IN AN ORGANIZED HEALTH CARE EDUCATION/TRAINING PROGRAM

## 2023-09-28 PROCEDURE — 3066F NEPHROPATHY DOC TX: CPT | Mod: CPTII,S$GLB,, | Performed by: STUDENT IN AN ORGANIZED HEALTH CARE EDUCATION/TRAINING PROGRAM

## 2023-09-28 PROCEDURE — 3078F PR MOST RECENT DIASTOLIC BLOOD PRESSURE < 80 MM HG: ICD-10-PCS | Mod: CPTII,S$GLB,, | Performed by: STUDENT IN AN ORGANIZED HEALTH CARE EDUCATION/TRAINING PROGRAM

## 2023-09-28 PROCEDURE — 3066F PR DOCUMENTATION OF TREATMENT FOR NEPHROPATHY: ICD-10-PCS | Mod: CPTII,S$GLB,, | Performed by: STUDENT IN AN ORGANIZED HEALTH CARE EDUCATION/TRAINING PROGRAM

## 2023-09-28 PROCEDURE — 4010F ACE/ARB THERAPY RXD/TAKEN: CPT | Mod: CPTII,S$GLB,, | Performed by: STUDENT IN AN ORGANIZED HEALTH CARE EDUCATION/TRAINING PROGRAM

## 2023-09-28 PROCEDURE — 3008F BODY MASS INDEX DOCD: CPT | Mod: CPTII,S$GLB,, | Performed by: STUDENT IN AN ORGANIZED HEALTH CARE EDUCATION/TRAINING PROGRAM

## 2023-09-28 PROCEDURE — 99213 PR OFFICE/OUTPT VISIT, EST, LEVL III, 20-29 MIN: ICD-10-PCS | Mod: 24,S$GLB,, | Performed by: STUDENT IN AN ORGANIZED HEALTH CARE EDUCATION/TRAINING PROGRAM

## 2023-09-28 PROCEDURE — 1159F MED LIST DOCD IN RCRD: CPT | Mod: CPTII,S$GLB,, | Performed by: STUDENT IN AN ORGANIZED HEALTH CARE EDUCATION/TRAINING PROGRAM

## 2023-09-28 PROCEDURE — 3008F PR BODY MASS INDEX (BMI) DOCUMENTED: ICD-10-PCS | Mod: CPTII,S$GLB,, | Performed by: STUDENT IN AN ORGANIZED HEALTH CARE EDUCATION/TRAINING PROGRAM

## 2023-09-28 NOTE — PROGRESS NOTES
Referring Provider:  Cirilo Sampson    Subjective:      Patient ID: Chelsi Johnson is a 43 y.o. female.    Chief Complaint: No chief complaint on file.    Problem List Items Addressed This Visit          Oncology    Intramural and subserous leiomyoma of uterus       Endocrine    Type 2 diabetes mellitus without complication, without long-term current use of insulin       Palliative Care    S/P robot-assisted surgical procedure, Hyst/BS     Other Visit Diagnoses       Obesity (BMI 35.0-39.9 without comorbidity) [E66.9]    -  Primary             HPI Recovering well from surgery. Here to review pathology    Review of Systems   Constitutional:  Negative for chills, fatigue and fever.   Respiratory:  Negative for cough and shortness of breath.    Cardiovascular:  Negative for chest pain.   Gastrointestinal:  Negative for abdominal distention, abdominal pain, constipation, diarrhea, nausea and vomiting.   Genitourinary:  Negative for dysuria, pelvic pain and vaginal bleeding.   Musculoskeletal:  Negative for back pain.   Psychiatric/Behavioral:  Negative for dysphoric mood. The patient is not nervous/anxious.      Past Medical History:   Diagnosis Date    Diabetes mellitus     Hypertension       Past Surgical History:   Procedure Laterality Date    BREAST LUMPECTOMY Right 2009    benign    ROBOT-ASSISTED LAPAROSCOPIC ABDOMINAL HYSTERECTOMY USING DA RICA XI N/A 9/13/2023    Procedure: XI ROBOTIC HYSTERECTOMY;  Surgeon: Austin Henson MD;  Location: [facility] OR;  Service: OB/GYN;  Laterality: N/A;  DR. HENSON GAVE 2 HOUR    ROBOT-ASSISTED SURGICAL REMOVAL OF FALLOPIAN TUBE USING DA RICA XI Bilateral 9/13/2023    Procedure: XI ROBOTIC SALPINGECTOMY;  Surgeon: Austin Henson MD;  Location: [facility] OR;  Service: OB/GYN;  Laterality: Bilateral;    TUBAL LIGATION        No family history on file.   Social History     Socioeconomic History    Marital status:         Objective:      There were no  vitals filed for this visit.     Physical Exam  Constitutional:       General: She is not in acute distress.  HENT:      Head: Normocephalic.   Eyes:      Extraocular Movements: Extraocular movements intact.      Conjunctiva/sclera: Conjunctivae normal.   Cardiovascular:      Rate and Rhythm: Normal rate.      Pulses: Normal pulses.   Pulmonary:      Effort: Pulmonary effort is normal. No respiratory distress.      Breath sounds: No wheezing.   Abdominal:      General: There is no distension.      Tenderness: There is no abdominal tenderness. There is no guarding or rebound.   Genitourinary:     Comments: External genitalia normal. Vagina normal. Uterus, cervix. Vaginal cuff intact.     Musculoskeletal:         General: No deformity.   Neurological:      Mental Status: She is alert and oriented to person, place, and time.   Psychiatric:         Mood and Affect: Mood normal.         Behavior: Behavior normal.         Thought Content: Thought content normal.       Lab Results   Component Value Date    WBC 7.28 09/06/2023    HGB 13.1 09/06/2023    HCT 41.3 09/06/2023    MCV 89 09/06/2023     09/06/2023        Assessment:       Obesity (BMI 35.0-39.9 without comorbidity) [E66.9]    Type 2 diabetes mellitus without complication, without long-term current use of insulin    Intramural and subserous leiomyoma of uterus    S/P robot-assisted surgical procedure, Hyst/BS           Plan:       Leiomyoma: s/p -[facility] BS on 9/13. Reviewed results and given benign diagnosis, no further treatment is necessary. Recommend continued post op precautions of no heavy lifting >10 lbs and nothing in vagina until 6 weeks following surgery. Reviewed that she will no longer need pap tests for cervical cancer screening. She will continue routine health maintenance with her PCP and gynecologist and I will be happy to see her back in the future if any new issues arise. Recommend tylenol and ibuprofen for post op pain as needed.    2.  Diabetes: Emphasized importance of glucose control in the postoperative period. Ok to resume Ozempic.    3. Obesity: recommend diet and exercise to achieve and maintain an healthy weight.    Though this visit took place within the global post op period, counseling today covered issues not related to the surgery, specifically glucose control for diabetes and strategies for weight loss and the health implications of losing weight.          Austin Ku MD

## 2023-09-28 NOTE — Clinical Note
Cirilo, thanks for sending Chelsi to see me. She is a wonderful lady. She is recovering well from her robotic hysterectomy. Path showed a 600 g fibroid uterus and no malignancy.  Please feel free to reach out anytime with questions or referrals, and I will always work to make sure I get patients seen as quickly as possible.  Thanks, Austin Ku Cell: [phone]

## 2023-10-06 ENCOUNTER — OFFICE VISIT (OUTPATIENT)
Dept: FAMILY MEDICINE | Facility: HOSPITAL | Age: 43
End: 2023-10-06
Payer: COMMERCIAL

## 2023-10-06 VITALS
SYSTOLIC BLOOD PRESSURE: 133 MMHG | WEIGHT: 221.31 LBS | BODY MASS INDEX: 36.87 KG/M2 | HEART RATE: 70 BPM | DIASTOLIC BLOOD PRESSURE: 89 MMHG | HEIGHT: 65 IN

## 2023-10-06 DIAGNOSIS — K59.00 CONSTIPATION, UNSPECIFIED CONSTIPATION TYPE: ICD-10-CM

## 2023-10-06 DIAGNOSIS — E11.9 TYPE 2 DIABETES MELLITUS WITHOUT COMPLICATION, WITHOUT LONG-TERM CURRENT USE OF INSULIN: Primary | ICD-10-CM

## 2023-10-06 PROCEDURE — 99214 OFFICE O/P EST MOD 30 MIN: CPT

## 2023-10-06 NOTE — PROGRESS NOTES
[facility]  History & Physical    SUBJECTIVE:     Chief Complaint:   Chief Complaint   Patient presents with    Follow-up       History of Present Illness:  43 y.o. female who  has a past medical history of Diabetes mellitus and Hypertension. presents to clinic today for follow up on diabetes. Currently on Ozempic 1 mg and Jardiance 10 mg. Endorses BG in 300's as she has been unable to take Ozempic for the last 10 days due to recent hysterectomy. Recently has resumed medication without any side effects. Patient has been endorsing constipation since surgery. Has been using OTC enemas every 3 days. Endorses adequate hydration. Denies any prior issues with constipation. Last A1C 9.5 down from 10. Endorses pain controlled. Treated with Norco 5 post surgery.      Allergies:  Review of patient's allergies indicates:   Allergen Reactions    Super bees Nausea And Vomiting, Shortness Of Breath and Swelling       Home Medications:  Current Outpatient Medications on File Prior to Visit   Medication Sig    ALBUTEROL INHL Inhale 2 puffs into the lungs as needed.    amLODIPine (NORVASC) 5 MG tablet Take 1 tablet (5 mg total) by mouth once daily.    empagliflozin (JARDIANCE) 10 mg tablet Take 1 tablet (10 mg total) by mouth once daily.    losartan (COZAAR) 25 MG tablet Take 2 tablets (50 mg total) by mouth once daily.    metFORMIN (GLUCOPHAGE) 1000 MG tablet Take 1,000 mg by mouth 2 (two) times daily with meals.    semaglutide (OZEMPIC) 1 mg/dose (4 mg/3 mL) Inject 1 mg into the skin every 7 days. (Patient taking differently: Inject 1 mg into the skin every 7 days. Last dose 8/31/2023-prior to surgery)    senna (SENOKOT) 8.6 mg tablet Take 1 tablet by mouth 2 (two) times daily as needed.    [DISCONTINUED] doxycycline (VIBRAMYCIN) 100 MG Cap Take 1 capsule (100 mg total) by mouth 2 (two) times daily. (Patient not taking: Reported on 10/6/2023)    [DISCONTINUED] elagolix-estradiol-norethindrn 300-1-0.5mg(AM) /300 mg(PM)  CpSQ Take 1 tablet by mouth 2 (two) times a day. (Patient not taking: Reported on 10/6/2023)    [DISCONTINUED] metroNIDAZOLE (FLAGYL) 500 MG tablet Take 1 tablet (500 mg total) by mouth every 12 (twelve) hours. (Patient not taking: Reported on 10/6/2023)    [DISCONTINUED] ondansetron (ZOFRAN) 4 MG tablet Take 1 tablet (4 mg total) by mouth daily as needed for Nausea. (Patient not taking: Reported on 10/6/2023)    [DISCONTINUED] oxyCODONE (ROXICODONE) 5 MG immediate release tablet Take 1 tablet (5 mg total) by mouth every 4 (four) hours as needed for Pain. (Patient not taking: Reported on 10/6/2023)     No current facility-administered medications on file prior to visit.       Past Medical History:   Diagnosis Date    Diabetes mellitus     Hypertension      Past Surgical History:   Procedure Laterality Date    BREAST LUMPECTOMY Right 2009    benign    ROBOT-ASSISTED LAPAROSCOPIC ABDOMINAL HYSTERECTOMY USING DA RICA XI N/A 9/13/2023    Procedure: XI ROBOTIC HYSTERECTOMY;  Surgeon: Austin Henson MD;  Location: [facility];  Service: OB/GYN;  Laterality: N/A;  DR. HENSON GAVE 2 HOUR    ROBOT-ASSISTED SURGICAL REMOVAL OF FALLOPIAN TUBE USING DA RICA XI Bilateral 9/13/2023    Procedure: XI ROBOTIC SALPINGECTOMY;  Surgeon: Austin Henson MD;  Location: [facility];  Service: OB/GYN;  Laterality: Bilateral;    TUBAL LIGATION       No family history on file.  Social History     Tobacco Use    Smoking status: Never     Passive exposure: Never    Smokeless tobacco: Never   Substance Use Topics    Alcohol use: Never    Drug use: Never        Review of Systems   Constitutional:  Negative for chills and fever.   HENT:  Negative for congestion and sore throat.    Eyes:  Negative for blurred vision.   Respiratory:  Negative for cough, shortness of breath and wheezing.    Cardiovascular:  Negative for chest pain and leg swelling.   Gastrointestinal:  Positive for constipation. Negative for abdominal pain, diarrhea,  nausea and vomiting.   Genitourinary:  Negative for dysuria.   Musculoskeletal:  Negative for joint pain and myalgias.   Skin:  Negative for rash.   Neurological:  Negative for dizziness and headaches.        OBJECTIVE:     Vital Signs:  Pulse: 70 (10/06/23 1124)  BP: 133/89 (10/06/23 1124)    Physical Exam  Constitutional:       Appearance: Normal appearance. She is obese.   HENT:      Head: Normocephalic and atraumatic.      Mouth/Throat:      Pharynx: Oropharynx is clear.   Eyes:      Extraocular Movements: Extraocular movements intact.      Pupils: Pupils are equal, round, and reactive to light.   Cardiovascular:      Rate and Rhythm: Normal rate and regular rhythm.   Pulmonary:      Effort: Pulmonary effort is normal.      Breath sounds: Normal breath sounds.   Abdominal:      General: Abdomen is flat. Bowel sounds are normal. There is no distension.      Palpations: Abdomen is soft.      Tenderness: There is no abdominal tenderness. There is no guarding or rebound.   Musculoskeletal:         General: Normal range of motion.      Cervical back: Normal range of motion and neck supple.   Skin:     General: Skin is warm and dry.   Neurological:      General: No focal deficit present.      Mental Status: She is alert and oriented to person, place, and time.   Psychiatric:         Mood and Affect: Mood normal.         Behavior: Behavior normal.       Laboratory:  Hemoglobin A1C   Date Value Ref Range Status   08/15/2023 9.5 (H) 4.0 - 5.6 % Final     Comment:     ADA Screening Guidelines:  5.7-6.4%  Consistent with prediabetes  >or=6.5%  Consistent with diabetes    High levels of fetal hemoglobin interfere with the HbA1C  assay. Heterozygous hemoglobin variants (HbS, HgC, etc)do  not significantly interfere with this assay.   However, presence of multiple variants may affect accuracy.     04/28/2023 10.0 (H) 4.0 - 5.6 % Final     Comment:     ADA Screening Guidelines:  5.7-6.4%  Consistent with prediabetes  >or=6.5%   Consistent with diabetes    High levels of fetal hemoglobin interfere with the HbA1C  assay. Heterozygous hemoglobin variants (HbS, HgC, etc)do  not significantly interfere with this assay.   However, presence of multiple variants may affect accuracy.         A/P:  Chelsi was seen today for follow-up.    Diagnoses and all orders for this visit:    Type 2 diabetes mellitus without complication, without long-term current use of insulin  -Continue current medication regimen.Off Ozempic for the past 10 days due to surgery. Recently resumed medication. Will repeat A1C in 3 months. May likely require medication adjustment during next visit based on BG.    Constipation, unspecified constipation type  -Likely 2/2 to recent surgery and narcotic use. Recommend discontinue OTC enema. Recommend Miralax and Fiber daily.       Follow up in about 3 months (around 1/6/2024) for Diabetes Follow up.        Pia Valenzuela MD  [facility], PGY-2  10/06/2023

## 2024-01-24 DIAGNOSIS — E11.9 TYPE 2 DIABETES MELLITUS WITHOUT COMPLICATION, WITHOUT LONG-TERM CURRENT USE OF INSULIN: ICD-10-CM

## 2024-02-07 ENCOUNTER — OFFICE VISIT (OUTPATIENT)
Dept: FAMILY MEDICINE | Facility: HOSPITAL | Age: 44
End: 2024-02-07
Payer: COMMERCIAL

## 2024-02-07 VITALS
OXYGEN SATURATION: 100 % | DIASTOLIC BLOOD PRESSURE: 93 MMHG | HEIGHT: 64 IN | HEART RATE: 88 BPM | SYSTOLIC BLOOD PRESSURE: 158 MMHG | WEIGHT: 229.06 LBS | BODY MASS INDEX: 39.11 KG/M2

## 2024-02-07 DIAGNOSIS — I10 PRIMARY HYPERTENSION: Primary | ICD-10-CM

## 2024-02-07 DIAGNOSIS — E11.9 TYPE 2 DIABETES MELLITUS WITHOUT COMPLICATION, WITHOUT LONG-TERM CURRENT USE OF INSULIN: ICD-10-CM

## 2024-02-07 DIAGNOSIS — K59.00 CONSTIPATION, UNSPECIFIED CONSTIPATION TYPE: ICD-10-CM

## 2024-02-07 PROCEDURE — 99213 OFFICE O/P EST LOW 20 MIN: CPT

## 2024-02-07 RX ORDER — AMLODIPINE BESYLATE 10 MG/1
10 TABLET ORAL DAILY
Qty: 30 TABLET | Refills: 11 | Status: SHIPPED | OUTPATIENT
Start: 2024-02-07 | End: 2025-02-06

## 2024-02-07 RX ORDER — LOSARTAN POTASSIUM 25 MG/1
50 TABLET ORAL DAILY
Qty: 90 TABLET | Refills: 6 | Status: SHIPPED | OUTPATIENT
Start: 2024-02-07

## 2024-02-07 RX ORDER — METFORMIN HYDROCHLORIDE 500 MG/1
1000 TABLET, EXTENDED RELEASE ORAL DAILY
Qty: 180 TABLET | Refills: 3 | Status: SHIPPED | OUTPATIENT
Start: 2024-02-07 | End: 2025-02-06

## 2024-02-07 RX ORDER — SEMAGLUTIDE 2.68 MG/ML
2 INJECTION, SOLUTION SUBCUTANEOUS
Qty: 3 ML | Refills: 11 | Status: SHIPPED | OUTPATIENT
Start: 2024-02-07 | End: 2024-02-21 | Stop reason: DRUGHIGH

## 2024-02-07 RX ORDER — POLYETHYLENE GLYCOL 3350 17 G/17G
17 POWDER, FOR SOLUTION ORAL DAILY
Qty: 510 G | Refills: 1 | Status: SHIPPED | OUTPATIENT
Start: 2024-02-07 | End: 2024-04-07

## 2024-02-10 NOTE — PLAN OF CARE
SW met with pt via [employer] to complete assessment. Pt is AxO x3  and able to verbally answer assessment questions. Pt able to confirm demographic information. Pt is supported by family at bedside and family currently being seen by assigned nurse as well. SW confirmed ok to ask questions wit family and nurse at bedside. Pt reports to live at home with her spouse and mother. Pt reports feeling safe and having enough support. Pt reports able to drive and be independent of all ADL's. Pt denies any DME in use. At time of discharge family to transport home. SW updated whiteboard with [facility] name and contact information. SW confirmed pt understanding of Observation unit and expected discharge plan. SW will continue to follow pt throughout care and assist with any discharge needs.         04/28/23 1506   Discharge Planning   Assessment Type Discharge Planning Brief Assessment   Resource/Environmental Concerns none   Support Systems Spouse/significant other;Family members;Parent   Equipment Currently Used at Home none   Current Living Arrangements home   Patient/Family Anticipates Transition to home with family   Patient/Family Anticipated Services at Transition none   DME Needed Upon Discharge  none   Discharge Plan A Home with family     No future appointments.    GALILEO Caraballo Case Management  [phone]     4 = No assist / stand by assistance

## 2024-02-15 NOTE — PROGRESS NOTES
\[facility]\"" Family Medicine  History & Physical    SUBJECTIVE:     Chief Complaint:   Chief Complaint   Patient presents with    Diabetes       History of Present Illness:  43 y.o. female who  has a past medical history of Diabetes mellitus and Hypertension. presents to clinic today for follow up on diabetes.  Patient endorses difficulty with adherence to diabetic diet due to recent life stressors.  Endorses she has been up 8 lb since initiating Ozempic.  Fasting blood glucose currently 229.  Endorses increased thirst and frequency.  Denies any dysuria.  Current medication regimen includes Ozempic 1 mg and Jardiance 10 mg.    # bilateral feet swelling  Endorses issues with bilateral feet swelling for the the past couple weeks.  Of note patient endorses she has been frequently on her feet and moving around.  Has been eating a lot of fast foods due to life stressors.  Denies any chest pain, shortness for breath, or palpitations.    # constipation  Endorses last bowel movement was approximately 2 days.  Currently goes to the bathroom every 4 days.  Has not been taking fiber or MiraLax.  Denies any abdominal pain, nausea, vomiting or diarrhea.    # elevated blood pressure  Blood pressure today 158/93 mmHg.  Currently on losartan 50 mg and amlodipine 5 mg.  Endorses adherence to antihypertensive medication regimen.    Allergies:  Review of patient's allergies indicates:   Allergen Reactions    Super bees Nausea And Vomiting, Shortness Of Breath and Swelling       Home Medications:  Current Outpatient Medications on File Prior to Visit   Medication Sig    ALBUTEROL INHL Inhale 2 puffs into the lungs as needed.     No current facility-administered medications on file prior to visit.       Past Medical History:   Diagnosis Date    Diabetes mellitus     Hypertension      Past Surgical History:   Procedure Laterality Date    BREAST LUMPECTOMY Right 2009    benign    ROBOT-ASSISTED LAPAROSCOPIC ABDOMINAL HYSTERECTOMY USING DA RICA XI  N/A 9/13/2023    Procedure: XI ROBOTIC HYSTERECTOMY;  Surgeon: Austin Henson MD;  Location: [facility] OR;  Service: OB/GYN;  Laterality: N/A;  DR. HENSON GAVE 2 HOUR    ROBOT-ASSISTED SURGICAL REMOVAL OF FALLOPIAN TUBE USING DA RICA XI Bilateral 9/13/2023    Procedure: XI ROBOTIC SALPINGECTOMY;  Surgeon: Austin Henson MD;  Location: [facility] OR;  Service: OB/GYN;  Laterality: Bilateral;    TUBAL LIGATION       No family history on file.  Social History     Tobacco Use    Smoking status: Never     Passive exposure: Never    Smokeless tobacco: Never   Substance Use Topics    Alcohol use: Never    Drug use: Never        Review of Systems   Constitutional:  Negative for chills and fever.   HENT:  Negative for congestion and sore throat.    Eyes:  Negative for blurred vision.   Respiratory:  Negative for cough, shortness of breath and wheezing.    Cardiovascular:  Negative for chest pain and leg swelling.   Gastrointestinal:  Negative for abdominal pain, nausea and vomiting.   Genitourinary:  Positive for frequency. Negative for dysuria, flank pain and urgency.   Musculoskeletal:  Negative for joint pain and myalgias.   Skin:  Negative for rash.   Neurological:  Negative for dizziness and headaches.   Endo/Heme/Allergies:  Positive for polydipsia.      OBJECTIVE:     Vital Signs:  Pulse: 88 (02/07/24 1017)  BP: (!) 158/93 (02/07/24 1017)  SpO2: 100 % (02/07/24 1017)    Physical Exam  Constitutional:       Appearance: Normal appearance. She is obese.   HENT:      Head: Normocephalic and atraumatic.      Mouth/Throat:      Pharynx: Oropharynx is clear.   Eyes:      Extraocular Movements: Extraocular movements intact.      Pupils: Pupils are equal, round, and reactive to light.   Cardiovascular:      Rate and Rhythm: Normal rate and regular rhythm.      Pulses: Normal pulses.      Heart sounds: Normal heart sounds.   Pulmonary:      Effort: Pulmonary effort is normal.      Breath sounds: Normal breath sounds.    Abdominal:      General: Abdomen is flat. Bowel sounds are normal.      Palpations: Abdomen is soft.   Musculoskeletal:         General: Normal range of motion.      Cervical back: Normal range of motion and neck supple.   Skin:     General: Skin is warm and dry.   Neurological:      General: No focal deficit present.      Mental Status: She is alert and oriented to person, place, and time.   Psychiatric:         Mood and Affect: Mood normal.         Behavior: Behavior normal.     Laboratory:  Hemoglobin A1C   Date Value Ref Range Status   02/07/2024 7.7 (H) 4.0 - 5.6 % Final     Comment:     ADA Screening Guidelines:  5.7-6.4%  Consistent with prediabetes  >or=6.5%  Consistent with diabetes    High levels of fetal hemoglobin interfere with the HbA1C  assay. Heterozygous hemoglobin variants (HbS, HgC, etc)do  not significantly interfere with this assay.   However, presence of multiple variants may affect accuracy.     08/15/2023 9.5 (H) 4.0 - 5.6 % Final     Comment:     ADA Screening Guidelines:  5.7-6.4%  Consistent with prediabetes  >or=6.5%  Consistent with diabetes    High levels of fetal hemoglobin interfere with the HbA1C  assay. Heterozygous hemoglobin variants (HbS, HgC, etc)do  not significantly interfere with this assay.   However, presence of multiple variants may affect accuracy.     04/28/2023 10.0 (H) 4.0 - 5.6 % Final     Comment:     ADA Screening Guidelines:  5.7-6.4%  Consistent with prediabetes  >or=6.5%  Consistent with diabetes    High levels of fetal hemoglobin interfere with the HbA1C  assay. Heterozygous hemoglobin variants (HbS, HgC, etc)do  not significantly interfere with this assay.   However, presence of multiple variants may affect accuracy.         A/P:  Chelsi was seen today for diabetes.    Diagnoses and all orders for this visit:    Primary hypertension  -     losartan (COZAAR) 25 MG tablet; Take 2 tablets (50 mg total) by mouth once daily.  -     amLODIPine (NORVASC) 10 MG  tablet; Take 1 tablet (10 mg total) by mouth once daily.    Type 2 diabetes mellitus without complication, without long-term current use of insulin  -Discussed long-term A1C goal of <7.0, AM blood sugar goal of  and postprandial goal of < 180 two hours after biggest meal.   Counseled patient on the importance of weight loss in those overweight or obese with DM as well as the importance of a heart-healthy diet and structured exercise program with a goal of 150 minutes of moderate to vigorous exercise per week.   -plan to increase Jardiance to 25 mg and Ozempic to 2 mg for better glucose control.  Upon further questioning patient endorses she was unable to tolerate prior dose of metformin that was prescribed when she was diagnosed with diabetes, however patient able to tolerate low-dose metformin.  Will start metformin 500 mg extended release for better glucose control.  Will adjust medications as needed.   -     empagliflozin (JARDIANCE) 25 mg tablet; Take 1 tablet (25 mg total) by mouth once daily.  -     metFORMIN (GLUCOPHAGE-XR) 500 MG ER 24hr tablet; Take 2 tablets (1,000 mg total) by mouth once daily.  -     semaglutide (OZEMPIC) 2 mg/dose (8 mg/3 mL) PnIj; Inject 2 mg into the skin every 7 days.  -     Hemoglobin A1C; Future    Constipation, unspecified constipation type  -     polyethylene glycol (GLYCOLAX) 17 gram/dose powder; mix and take  17 g by mouth once daily  as directed  -Recommend Miralax and Fiber daily.  Patient advised to include high-fiber foods in diet as this may aid with constipation symptoms.      Follow up in about 1 month (around 3/7/2024) for Medications.        Pia Valenzuela MD  [facility] Family Medicine, PGY-2  02/15/2024

## 2024-02-16 ENCOUNTER — PATIENT MESSAGE (OUTPATIENT)
Dept: FAMILY MEDICINE | Facility: HOSPITAL | Age: 44
End: 2024-02-16
Payer: COMMERCIAL

## 2024-02-21 DIAGNOSIS — E11.9 TYPE 2 DIABETES MELLITUS WITHOUT COMPLICATION, WITHOUT LONG-TERM CURRENT USE OF INSULIN: Primary | ICD-10-CM

## 2024-02-21 RX ORDER — SEMAGLUTIDE 2.68 MG/ML
2 INJECTION, SOLUTION SUBCUTANEOUS
Qty: 3 ML | Refills: 1 | Status: SHIPPED | OUTPATIENT
Start: 2024-02-21 | End: 2024-02-28 | Stop reason: SDUPTHER

## 2024-02-22 ENCOUNTER — TELEPHONE (OUTPATIENT)
Dept: FAMILY MEDICINE | Facility: HOSPITAL | Age: 44
End: 2024-02-22
Payer: COMMERCIAL

## 2024-02-22 NOTE — TELEPHONE ENCOUNTER
Spoke to patient in regards to recent medication adjustment. Most recent A1C 7.7. Discontinued Jardiance. Ozempic currently 2mg and Metformin 500 mg daily. Will re-evaluate for any side effects and weight loss. May require change to different GLP-1. Patient verbalized understanding and agreement to reccomendations. All questions answered.        Pia Valenzuela MD  [facility] Family Medicine, PGY-2  02/22/2024

## 2024-02-28 DIAGNOSIS — E11.9 TYPE 2 DIABETES MELLITUS WITHOUT COMPLICATION, WITHOUT LONG-TERM CURRENT USE OF INSULIN: ICD-10-CM

## 2024-02-29 RX ORDER — SEMAGLUTIDE 2.68 MG/ML
2 INJECTION, SOLUTION SUBCUTANEOUS
Qty: 3 ML | Refills: 1 | Status: SHIPPED | OUTPATIENT
Start: 2024-02-29 | End: 2024-04-25

## 2024-03-05 ENCOUNTER — OFFICE VISIT (OUTPATIENT)
Dept: FAMILY MEDICINE | Facility: HOSPITAL | Age: 44
End: 2024-03-05
Payer: COMMERCIAL

## 2024-03-05 VITALS
HEART RATE: 86 BPM | BODY MASS INDEX: 37.9 KG/M2 | SYSTOLIC BLOOD PRESSURE: 136 MMHG | HEIGHT: 64 IN | DIASTOLIC BLOOD PRESSURE: 84 MMHG | WEIGHT: 222 LBS

## 2024-03-05 DIAGNOSIS — E11.9 TYPE 2 DIABETES MELLITUS WITHOUT COMPLICATION, WITHOUT LONG-TERM CURRENT USE OF INSULIN: Primary | ICD-10-CM

## 2024-03-05 DIAGNOSIS — E78.5 HYPERLIPIDEMIA, UNSPECIFIED HYPERLIPIDEMIA TYPE: ICD-10-CM

## 2024-03-05 PROCEDURE — 99214 OFFICE O/P EST MOD 30 MIN: CPT | Performed by: STUDENT IN AN ORGANIZED HEALTH CARE EDUCATION/TRAINING PROGRAM

## 2024-03-05 RX ORDER — ATORVASTATIN CALCIUM 40 MG/1
40 TABLET, FILM COATED ORAL DAILY
Qty: 90 TABLET | Refills: 3 | Status: SHIPPED | OUTPATIENT
Start: 2024-03-05 | End: 2025-03-05

## 2024-03-05 NOTE — PROGRESS NOTES
\[facility]\"" Family Medicine  History & Physical    SUBJECTIVE:     Chief Complaint:   Chief Complaint   Patient presents with    Follow-up     DIABETES       History of Present Illness:  43 y.o. female who  has a past medical history of Diabetes mellitus and Hypertension. presents to clinic today for f/u of T2DM and HTN. Pt recently seen and restarted on metformin and increased dose of ozempic. Pt denies any episodes of hypoglycemia or issues with medications. She has struggled recently with diet and exercise as her  was hospitalized since 12/23 with the flu and further complications. She is unable to check BG daily but fasting BG in clinic today is 116. She denies any additional issues today.       Allergies:  Review of patient's allergies indicates:   Allergen Reactions    Super bees Nausea And Vomiting, Shortness Of Breath and Swelling       Home Medications:  Current Outpatient Medications on File Prior to Visit   Medication Sig    ALBUTEROL INHL Inhale 2 puffs into the lungs as needed.    amLODIPine (NORVASC) 10 MG tablet Take 1 tablet (10 mg total) by mouth once daily.    losartan (COZAAR) 25 MG tablet Take 2 tablets (50 mg total) by mouth once daily.    metFORMIN (GLUCOPHAGE-XR) 500 MG ER 24hr tablet Take 2 tablets (1,000 mg total) by mouth once daily.    semaglutide (OZEMPIC) 2 mg/dose (8 mg/3 mL) PnIj Inject 2 mg into the skin every 7 days.    polyethylene glycol (GLYCOLAX) 17 gram/dose powder mix and take  17 g by mouth once daily  as directed (Patient not taking: Reported on 3/5/2024)     No current facility-administered medications on file prior to visit.       Past Medical History:   Diagnosis Date    Diabetes mellitus     Hypertension      Past Surgical History:   Procedure Laterality Date    BREAST LUMPECTOMY Right 2009    benign    ROBOT-ASSISTED LAPAROSCOPIC ABDOMINAL HYSTERECTOMY USING DA RICA XI N/A 9/13/2023    Procedure: XI ROBOTIC HYSTERECTOMY;  Surgeon: Austin Ku MD;  Location:  [facility] OR;  Service: OB/GYN;  Laterality: N/A;  DR. HENSON GAVE 2 HOUR    ROBOT-ASSISTED SURGICAL REMOVAL OF FALLOPIAN TUBE USING DA RICA XI Bilateral 9/13/2023    Procedure: XI ROBOTIC SALPINGECTOMY;  Surgeon: Austin Henson MD;  Location: [facility] OR;  Service: OB/GYN;  Laterality: Bilateral;    TUBAL LIGATION       No family history on file.  Social History     Tobacco Use    Smoking status: Never     Passive exposure: Never    Smokeless tobacco: Never   Substance Use Topics    Alcohol use: Never    Drug use: Never        Review of Systems   Constitutional:  Negative for chills, fever and malaise/fatigue.   HENT:  Negative for congestion, sinus pain and sore throat.    Eyes:  Negative for photophobia.   Respiratory:  Negative for cough, sputum production and shortness of breath.    Cardiovascular:  Negative for chest pain, palpitations and leg swelling.   Gastrointestinal:  Negative for abdominal pain, constipation, diarrhea, nausea and vomiting.   Genitourinary:  Negative for dysuria and hematuria.   Musculoskeletal:  Negative for myalgias.   Skin:  Negative for rash.   Neurological:  Negative for sensory change, weakness and headaches.        OBJECTIVE:     Vital Signs:  Pulse: 86 (03/05/24 1110)  BP: 136/84 (03/05/24 1110)    Physical Exam  Vitals reviewed.   Constitutional:       General: She is not in acute distress.  HENT:      Head: Normocephalic and atraumatic.      Right Ear: External ear normal.      Left Ear: External ear normal.      Nose: Nose normal.   Eyes:      Conjunctiva/sclera: Conjunctivae normal.      Pupils: Pupils are equal, round, and reactive to light.   Cardiovascular:      Rate and Rhythm: Normal rate and regular rhythm.      Pulses: Normal pulses.      Heart sounds: Normal heart sounds. No murmur heard.     No friction rub. No gallop.   Pulmonary:      Effort: Pulmonary effort is normal.      Breath sounds: Normal breath sounds. No wheezing, rhonchi or rales.   Abdominal:       General: Bowel sounds are normal.      Palpations: There is no mass.      Tenderness: There is no abdominal tenderness.   Musculoskeletal:         General: No swelling or tenderness. Normal range of motion.      Cervical back: Normal range of motion.   Lymphadenopathy:      Cervical: No cervical adenopathy.   Skin:     General: Skin is warm and dry.      Findings: No rash.   Neurological:      General: No focal deficit present.      Mental Status: She is alert.         Laboratory:  Hemoglobin A1C   Date Value Ref Range Status   02/07/2024 7.7 (H) 4.0 - 5.6 % Final     Comment:     ADA Screening Guidelines:  5.7-6.4%  Consistent with prediabetes  >or=6.5%  Consistent with diabetes    High levels of fetal hemoglobin interfere with the HbA1C  assay. Heterozygous hemoglobin variants (HbS, HgC, etc)do  not significantly interfere with this assay.   However, presence of multiple variants may affect accuracy.     08/15/2023 9.5 (H) 4.0 - 5.6 % Final     Comment:     ADA Screening Guidelines:  5.7-6.4%  Consistent with prediabetes  >or=6.5%  Consistent with diabetes    High levels of fetal hemoglobin interfere with the HbA1C  assay. Heterozygous hemoglobin variants (HbS, HgC, etc)do  not significantly interfere with this assay.   However, presence of multiple variants may affect accuracy.     04/28/2023 10.0 (H) 4.0 - 5.6 % Final     Comment:     ADA Screening Guidelines:  5.7-6.4%  Consistent with prediabetes  >or=6.5%  Consistent with diabetes    High levels of fetal hemoglobin interfere with the HbA1C  assay. Heterozygous hemoglobin variants (HbS, HgC, etc)do  not significantly interfere with this assay.   However, presence of multiple variants may affect accuracy.         A/P:  Chelsi was seen today for follow-up. Pt doing well overall. Discussed chronic medical conditions and medication compliance. Pt with significant improvement in A1c from previous. Encouraged cnt medication compliance and lifestyle modifications.  Upon review of recent lab results pt would benefit from statin medication. Discussed risks vs benefits and pt in agreement to start medication. HCM reviewed and completed eye exam recently now only pending colonoscopy. Pt encouraged to f/u in 3mo to repeat A1c or sooner if needed.     Diagnoses and all orders for this visit:    Type 2 diabetes mellitus without complication, without long-term current use of insulin  -     atorvastatin (LIPITOR) 40 MG tablet; Take 1 tablet (40 mg total) by mouth once daily.    Hyperlipidemia, unspecified hyperlipidemia type  -     atorvastatin (LIPITOR) 40 MG tablet; Take 1 tablet (40 mg total) by mouth once daily.      F/u in 3mo to repeat A1c and further discuss medications.       Luis Daniel Abraham MD  [facility] Family Medicine, PGY-3  03/05/2024
